# Patient Record
Sex: MALE | Race: BLACK OR AFRICAN AMERICAN | NOT HISPANIC OR LATINO | Employment: OTHER
[De-identification: names, ages, dates, MRNs, and addresses within clinical notes are randomized per-mention and may not be internally consistent; named-entity substitution may affect disease eponyms.]

---

## 2017-07-21 ENCOUNTER — CHARTING TRANS (OUTPATIENT)
Dept: OTHER | Age: 72
End: 2017-07-21

## 2018-01-26 ENCOUNTER — CHARTING TRANS (OUTPATIENT)
Dept: OTHER | Age: 73
End: 2018-01-26

## 2018-11-02 VITALS — HEIGHT: 78 IN | HEART RATE: 64 BPM | SYSTOLIC BLOOD PRESSURE: 116 MMHG | DIASTOLIC BLOOD PRESSURE: 74 MMHG

## 2018-11-03 VITALS — HEART RATE: 76 BPM | SYSTOLIC BLOOD PRESSURE: 130 MMHG | HEIGHT: 78 IN | DIASTOLIC BLOOD PRESSURE: 78 MMHG

## 2019-01-12 RX ORDER — CARVEDILOL 3.12 MG/1
TABLET ORAL
COMMUNITY
Start: 2018-06-13 | End: 2019-06-13

## 2019-04-12 ENCOUNTER — HOSPITAL (OUTPATIENT)
Dept: OTHER | Age: 74
End: 2019-04-12

## 2019-04-12 ENCOUNTER — HOSPITAL (OUTPATIENT)
Dept: OTHER | Age: 74
End: 2019-04-12
Attending: INTERNAL MEDICINE

## 2019-04-12 ENCOUNTER — DIAGNOSTIC TRANS (OUTPATIENT)
Dept: OTHER | Age: 74
End: 2019-04-12

## 2019-04-12 LAB
ALBUMIN SERPL-MCNC: 3.5 GM/DL (ref 3.6–5.1)
ALBUMIN/GLOB SERPL: 0.7 {RATIO} (ref 1–2.4)
ALP SERPL-CCNC: 101 UNIT/L (ref 45–117)
ALT SERPL-CCNC: 17 UNIT/L
ANALYZER ANC (IANC): ABNORMAL
ANION GAP SERPL CALC-SCNC: 16 MMOL/L (ref 10–20)
APPEARANCE UR: ABNORMAL
AST SERPL-CCNC: 42 UNIT/L
BACTERIA #/AREA URNS HPF: ABNORMAL /HPF
BASOPHILS # BLD: 0.1 THOUSAND/MCL (ref 0–0.3)
BASOPHILS NFR BLD: 1 %
BILIRUB SERPL-MCNC: 1.3 MG/DL (ref 0.2–1)
BILIRUB UR QL: NEGATIVE
BUN SERPL-MCNC: 10 MG/DL (ref 6–20)
BUN/CREAT SERPL: 12 (ref 7–25)
CALCIUM SERPL-MCNC: 9.5 MG/DL (ref 8.4–10.2)
CHLORIDE: 101 MMOL/L (ref 98–107)
CO2 SERPL-SCNC: 24 MMOL/L (ref 21–32)
COLOR UR: YELLOW
CREAT SERPL-MCNC: 0.86 MG/DL (ref 0.67–1.17)
DIFFERENTIAL METHOD BLD: ABNORMAL
EOSINOPHIL # BLD: 0.3 THOUSAND/MCL (ref 0.1–0.5)
EOSINOPHIL NFR BLD: 3 %
ERYTHROCYTE [DISTWIDTH] IN BLOOD: 14.5 % (ref 11–15)
GLOBULIN SER-MCNC: 5.1 GM/DL (ref 2–4)
GLUCOSE SERPL-MCNC: 118 MG/DL (ref 65–99)
GLUCOSE UR-MCNC: NEGATIVE MG/DL
HEMATOCRIT: 46.2 % (ref 39–51)
HGB BLD-MCNC: 15 GM/DL (ref 13–17)
HGB UR QL: ABNORMAL
HYALINE CASTS #/AREA URNS LPF: ABNORMAL /LPF (ref 0–5)
IMM GRANULOCYTES # BLD AUTO: 0 THOUSAND/MCL (ref 0–0.2)
IMM GRANULOCYTES NFR BLD: 0 %
INR PPP: 1.1
KETONES UR-MCNC: NEGATIVE MG/DL
LEUKOCYTE ESTERASE UR QL STRIP: ABNORMAL
LIPASE SERPL-CCNC: 182 UNIT/L (ref 73–393)
LYMPHOCYTES # BLD: 2.1 THOUSAND/MCL (ref 1–4)
LYMPHOCYTES NFR BLD: 19 %
MCH RBC QN AUTO: 31.6 PG (ref 26–34)
MCHC RBC AUTO-ENTMCNC: 32.5 GM/DL (ref 32–36.5)
MCV RBC AUTO: 97.5 FL (ref 78–100)
MONOCYTES # BLD: 0.6 THOUSAND/MCL (ref 0.3–0.9)
MONOCYTES NFR BLD: 5 %
NEUTROPHILS # BLD: 7.9 THOUSAND/MCL (ref 1.8–7.7)
NEUTROPHILS NFR BLD: 72 %
NEUTS SEG NFR BLD: ABNORMAL %
NITRITE UR QL: POSITIVE
NRBC (NRBCRE): 0 /100 WBC
NT-PROBNP SERPL-MCNC: 618 PG/ML
PH UR: 6 UNIT (ref 5–7)
PLATELET # BLD: 189 THOUSAND/MCL (ref 140–450)
POTASSIUM SERPL-SCNC: 4.6 MMOL/L (ref 3.4–5.1)
PROT SERPL-MCNC: 8.6 GM/DL (ref 6.4–8.2)
PROT UR QL: NEGATIVE MG/DL
PROTHROMBIN TIME: 11.2 SECONDS (ref 9.7–11.8)
PROTHROMBIN TIME: NORMAL
RBC # BLD: 4.74 MILLION/MCL (ref 4.5–5.9)
RBC #/AREA URNS HPF: ABNORMAL /HPF (ref 0–3)
SODIUM SERPL-SCNC: 136 MMOL/L (ref 135–145)
SP GR UR: <1.005 (ref 1–1.03)
SPECIMEN SOURCE: ABNORMAL
SQUAMOUS #/AREA URNS HPF: ABNORMAL /HPF (ref 0–5)
TROPONIN I SERPL HS-MCNC: 0.16 NG/ML
TROPONIN I SERPL HS-MCNC: 0.18 NG/ML
UROBILINOGEN UR QL: 0.2 MG/DL (ref 0–1)
WBC # BLD: 10.9 THOUSAND/MCL (ref 4.2–11)
WBC #/AREA URNS HPF: ABNORMAL /HPF (ref 0–5)

## 2019-04-13 LAB
ANALYZER ANC (IANC): ABNORMAL
ANION GAP SERPL CALC-SCNC: 14 MMOL/L (ref 10–20)
BASOPHILS # BLD: 0.1 THOUSAND/MCL (ref 0–0.3)
BASOPHILS NFR BLD: 1 %
BUN SERPL-MCNC: 12 MG/DL (ref 6–20)
BUN/CREAT SERPL: 12 (ref 7–25)
CALCIUM SERPL-MCNC: 9.5 MG/DL (ref 8.4–10.2)
CHLORIDE: 104 MMOL/L (ref 98–107)
CO2 SERPL-SCNC: 25 MMOL/L (ref 21–32)
CREAT SERPL-MCNC: 1 MG/DL (ref 0.67–1.17)
DIFFERENTIAL METHOD BLD: ABNORMAL
EOSINOPHIL # BLD: 0.2 THOUSAND/MCL (ref 0.1–0.5)
EOSINOPHIL NFR BLD: 2 %
ERYTHROCYTE [DISTWIDTH] IN BLOOD: 14.4 % (ref 11–15)
GLUCOSE SERPL-MCNC: 107 MG/DL (ref 65–99)
HEMATOCRIT: 42.7 % (ref 39–51)
HGB BLD-MCNC: 13.8 GM/DL (ref 13–17)
IMM GRANULOCYTES # BLD AUTO: 0 THOUSAND/MCL (ref 0–0.2)
IMM GRANULOCYTES NFR BLD: 0 %
LYMPHOCYTES # BLD: 2.1 THOUSAND/MCL (ref 1–4)
LYMPHOCYTES NFR BLD: 21 %
MCH RBC QN AUTO: 31.2 PG (ref 26–34)
MCHC RBC AUTO-ENTMCNC: 32.3 GM/DL (ref 32–36.5)
MCV RBC AUTO: 96.4 FL (ref 78–100)
MONOCYTES # BLD: 0.6 THOUSAND/MCL (ref 0.3–0.9)
MONOCYTES NFR BLD: 6 %
NEUTROPHILS # BLD: 7 THOUSAND/MCL (ref 1.8–7.7)
NEUTROPHILS NFR BLD: 70 %
NEUTS SEG NFR BLD: ABNORMAL %
NRBC (NRBCRE): 0 /100 WBC
PLATELET # BLD: 185 THOUSAND/MCL (ref 140–450)
POTASSIUM SERPL-SCNC: 3.2 MMOL/L (ref 3.4–5.1)
RBC # BLD: 4.43 MILLION/MCL (ref 4.5–5.9)
SODIUM SERPL-SCNC: 140 MMOL/L (ref 135–145)
WBC # BLD: 10 THOUSAND/MCL (ref 4.2–11)

## 2019-04-14 LAB
ANALYZER ANC (IANC): ABNORMAL
APPEARANCE UR: NORMAL
BASOPHILS # BLD: 0.1 THOUSAND/MCL (ref 0–0.3)
BASOPHILS NFR BLD: 1 %
BILIRUB UR QL: NEGATIVE
COLOR UR: YELLOW
CREAT SERPL-MCNC: 0.96 MG/DL (ref 0.67–1.17)
DIFFERENTIAL METHOD BLD: ABNORMAL
EOSINOPHIL # BLD: 0.3 THOUSAND/MCL (ref 0.1–0.5)
EOSINOPHIL NFR BLD: 3 %
ERYTHROCYTE [DISTWIDTH] IN BLOOD: 14.3 % (ref 11–15)
GLUCOSE UR-MCNC: NEGATIVE MG/DL
HEMATOCRIT: 39.5 % (ref 39–51)
HGB BLD-MCNC: 12.9 GM/DL (ref 13–17)
HGB UR QL: NEGATIVE
IMM GRANULOCYTES # BLD AUTO: 0 THOUSAND/MCL (ref 0–0.2)
IMM GRANULOCYTES NFR BLD: 0 %
KETONES UR-MCNC: NEGATIVE MG/DL
LEUKOCYTE ESTERASE UR QL STRIP: NEGATIVE
LYMPHOCYTES # BLD: 1.8 THOUSAND/MCL (ref 1–4)
LYMPHOCYTES NFR BLD: 23 %
MCH RBC QN AUTO: 31.5 PG (ref 26–34)
MCHC RBC AUTO-ENTMCNC: 32.7 GM/DL (ref 32–36.5)
MCV RBC AUTO: 96.6 FL (ref 78–100)
MICROSCOPIC (MT): NORMAL
MONOCYTES # BLD: 0.6 THOUSAND/MCL (ref 0.3–0.9)
MONOCYTES NFR BLD: 8 %
NEUTROPHILS # BLD: 4.9 THOUSAND/MCL (ref 1.8–7.7)
NEUTROPHILS NFR BLD: 65 %
NEUTS SEG NFR BLD: ABNORMAL %
NITRITE UR QL: NEGATIVE
NRBC (NRBCRE): 0 /100 WBC
PH UR: 5.5 UNIT (ref 5–7)
PLATELET # BLD: 181 THOUSAND/MCL (ref 140–450)
POTASSIUM SERPL-SCNC: 3.4 MMOL/L (ref 3.4–5.1)
PROT UR QL: NEGATIVE MG/DL
RBC # BLD: 4.09 MILLION/MCL (ref 4.5–5.9)
SP GR UR: 1.03 (ref 1–1.03)
SPECIMEN SOURCE: NORMAL
UROBILINOGEN UR QL: 0.2 MG/DL (ref 0–1)
WBC # BLD: 7.7 THOUSAND/MCL (ref 4.2–11)

## 2019-04-22 ENCOUNTER — HOSPITAL ENCOUNTER (EMERGENCY)
Facility: HOSPITAL | Age: 74
Discharge: HOME | End: 2019-04-22
Attending: FAMILY MEDICINE | Admitting: EMERGENCY MEDICINE
Payer: MEDICARE

## 2019-04-22 VITALS
RESPIRATION RATE: 18 BRPM | DIASTOLIC BLOOD PRESSURE: 75 MMHG | WEIGHT: 161.69 LBS | OXYGEN SATURATION: 100 % | HEART RATE: 75 BPM | SYSTOLIC BLOOD PRESSURE: 142 MMHG | BODY MASS INDEX: 19.69 KG/M2 | TEMPERATURE: 99 F | HEIGHT: 76 IN

## 2019-04-22 DIAGNOSIS — M79.89 SWELLING OF RIGHT LOWER EXTREMITY: Primary | ICD-10-CM

## 2019-04-22 DIAGNOSIS — M79.89 LEG SWELLING: ICD-10-CM

## 2019-04-22 LAB — D DIMER PPP IA.FEU-MCNC: 14.35 MG/L FEU

## 2019-04-22 PROCEDURE — 99284 EMERGENCY DEPT VISIT MOD MDM: CPT

## 2019-04-22 PROCEDURE — 85379 FIBRIN DEGRADATION QUANT: CPT | Mod: ER

## 2019-04-22 RX ORDER — ASPIRIN 81 MG/1
81 TABLET ORAL DAILY
COMMUNITY

## 2019-04-22 RX ORDER — CARVEDILOL 12.5 MG/1
12.5 TABLET ORAL 2 TIMES DAILY WITH MEALS
COMMUNITY

## 2019-04-22 RX ORDER — NAPROXEN 500 MG/1
500 TABLET ORAL 2 TIMES DAILY WITH MEALS
Qty: 14 TABLET | Refills: 0 | Status: SHIPPED | OUTPATIENT
Start: 2019-04-22 | End: 2019-04-29

## 2019-04-22 NOTE — ED NOTES
Report received from SUSHIL Rod RN with time allowed for and all questions answered; agree with previous RN's assessment and patient care accepted at this time.

## 2019-04-22 NOTE — ED PROVIDER NOTES
Encounter Date: 4/22/2019       History     Chief Complaint   Patient presents with    Leg Swelling     right leg swelling. sent from [facility] for US. +pain to extremety.      This is a 73-year-old Afro-American male presents emergency department with right lower extremity swelling. States that he had come down here from a on a flight from [place] last Friday if that which the of the swelling started.  Denies any pain. Denies any chest pain or shortness of breath.        Review of patient's allergies indicates:  No Known Allergies  Past Medical History:   Diagnosis Date    MI (myocardial infarction)     Pacemaker     Prostate cancer     TIA (transient ischemic attack)      Past Surgical History:   Procedure Laterality Date    CORONARY ARTERY BYPASS GRAFT      INSERTION OF PACEMAKER       History reviewed. No pertinent family history.  Social History     Tobacco Use    Smoking status: Former Smoker    Smokeless tobacco: Never Used   Substance Use Topics    Alcohol use: Not Currently    Drug use: Never     Review of Systems   Constitutional: Negative for chills, fatigue and fever.   HENT: Negative for postnasal drip, rhinorrhea, sneezing, sore throat and trouble swallowing.    Respiratory: Negative for cough, chest tightness and shortness of breath.    Cardiovascular: Negative for chest pain and palpitations.   Gastrointestinal: Negative for abdominal distention, abdominal pain, constipation, diarrhea, nausea and vomiting.   Genitourinary: Negative for dysuria, enuresis, flank pain and hematuria.   Musculoskeletal: Negative for arthralgias, back pain, gait problem and myalgias.        Right leg swelling   Skin: Negative for rash.   Neurological: Negative for dizziness, syncope, weakness and light-headedness.   Hematological: Does not bruise/bleed easily.       Physical Exam     Initial Vitals [04/22/19 1759]   BP Pulse Resp Temp SpO2   (!) 140/88 75 20 98.3 °F (36.8 °C) 98 %      MAP       --         Physical  Exam    Nursing note and vitals reviewed.  Constitutional: He appears well-developed and well-nourished. No distress.   HENT:   Head: Normocephalic.   Right Ear: External ear normal.   Left Ear: External ear normal.   Nose: Nose normal.   Mouth/Throat: Oropharynx is clear and moist.   Eyes: Conjunctivae and EOM are normal. Pupils are equal, round, and reactive to light.   Neck: Normal range of motion. Neck supple.   Cardiovascular: Normal rate, regular rhythm and normal heart sounds.   Pulmonary/Chest: Breath sounds normal. No respiratory distress.   Abdominal: Soft. Bowel sounds are normal. He exhibits no distension. There is no tenderness. There is no rebound and no guarding.   Musculoskeletal: Normal range of motion.   Right lower extremity has +2 pitting edema with erythema.  No warmth, slight positive Homans sign.   Neurological: He is alert and oriented to person, place, and time. He has normal strength. No sensory deficit.   Skin: Skin is warm. Capillary refill takes less than 2 seconds. No rash noted.   Psychiatric: He has a normal mood and affect. His behavior is normal. Thought content normal.         ED Course   Procedures  Labs Reviewed   D DIMER, QUANTITATIVE - Abnormal; Notable for the following components:       Result Value    D-Dimer 14.35 (*)     All other components within normal limits          Imaging Results    None                               Clinical Impression:       ICD-10-CM ICD-9-CM   1. Swelling of right lower extremity M79.89 729.81                 All historical, clinical, radiographic, and laboratory findings were reviewed with the patient/family in detail along with the indications for transfer to [facility].  All remaining questions and concerns were addressed at that time and the patient/family agrees to proceed accordingly.  Similarly all pertinent details of the encounter were discussed with Dr. Easley at [facility] who agrees to accept the patient in transfer based on the needs/patient  preferences outlined above.  Patient will be transferred by [facility] ambulance services secondary to a need for ongoing IV en route.  Flakita Trejo MD  7:01 PM                 Flakita Trejo MD  04/22/19 3419

## 2019-04-23 NOTE — PROVIDER PROGRESS NOTES - EMERGENCY DEPT.
Encounter Date: 4/22/2019    ED Physician Progress Notes             SCRIBE #1 NOTE: I, Aide Victor, am scribing for, and in the presence of, Andrea Vazquez MD. I have scribed the entire note.       History     Chief Complaint   Patient presents with    Leg Swelling     right leg swelling. sent from [facility] for US. +pain to extremety.      Review of patient's allergies indicates:  No Known Allergies      History of Present Illness     HPI    4/22/2019, 8:49 PM  History obtained from the patient and family member      History of Present Illness: Clark Belle is a 73 y.o. male patient with a PMHx of MI, stroke, TIA, prostate cancer, and pacemaker who presents to the Emergency Department for evaluation of RLE swelling which onset gradually 3 days ago. Pt was sent from [facility] by Dr. Trejo for an ultrasound. Family member states pt traveled from [place], [place] to [place] via plane in which the swelling onset. Symptoms are constant and moderate in severity. No mitigating or exacerbating factors reported. Associated sxs include RLE pain. Patient denies any fever, chills, diaphoresis, SOB, cough, CP, leg swelling, palpitations, n/v, extremity weakness/numbness, dizziness, and all other sxs at this time. No prior Tx. No further complaints or concerns at this time.       Arrival mode: [facility]    PCP: Primary Doctor No        Past Medical History:  Past Medical History:   Diagnosis Date    MI (myocardial infarction)     Pacemaker     Prostate cancer     TIA (transient ischemic attack)        Past Surgical History:  Past Surgical History:   Procedure Laterality Date    CORONARY ARTERY BYPASS GRAFT      INSERTION OF PACEMAKER           Family History:  History reviewed. No pertinent family history.    Social History:  Social History     Tobacco Use    Smoking status: Former Smoker    Smokeless tobacco: Never Used   Substance and Sexual Activity    Alcohol use: Not Currently    Drug use: Never     Sexual activity: Not Currently        Review of Systems     Review of Systems   Constitutional: Negative for chills, diaphoresis and fever.   HENT: Negative for rhinorrhea and sore throat.    Respiratory: Negative for cough and shortness of breath.    Cardiovascular: Positive for leg swelling (R). Negative for chest pain and palpitations.   Gastrointestinal: Negative for diarrhea, nausea and vomiting.   Genitourinary: Negative for dysuria, frequency and urgency.   Musculoskeletal: Negative for back pain.        (+) RLE pain   Skin: Negative for rash.   Neurological: Negative for dizziness, weakness and numbness.   All other systems reviewed and are negative.     Physical Exam     Initial Vitals [04/22/19 1759]   BP Pulse Resp Temp SpO2   (!) 140/88 75 20 98.3 °F (36.8 °C) 98 %      MAP       --          Physical Exam    Nursing Notes and Vital Signs Reviewed.  Constitutional: Patient is in no acute distress. Well-developed and well-nourished.  Head: Atraumatic. Normocephalic.  Eyes: PERRL. EOM intact. Conjunctivae are not pale. No scleral icterus.  ENT: Mucous membranes are moist. Oropharynx is clear and symmetric.    Neck: Supple. Full ROM. No lymphadenopathy.  Cardiovascular: Regular rate. Regular rhythm. No murmurs, rubs, or gallops. Distal pulses are 2+ and symmetric.  Pulmonary/Chest: No respiratory distress. Clear to auscultation bilaterally. No wheezing or rales.  Abdominal: Soft and non-distended.  There is no tenderness.  No rebound, guarding, or rigidity. Good bowel sounds.  Musculoskeletal: Moves all extremities. No obvious deformities.   RLE: No evident deformity. Positive for swelling.   Skin: Warm and dry.  Neurological:  Alert, awake, and appropriate.  Normal speech.  No acute focal neurological deficits are appreciated.  Psychiatric: Normal affect. Good eye contact. Appropriate in content.     ED Course   Procedures  ED Vital Signs:  Vitals:    04/22/19 1759 04/22/19 1803 04/22/19 1822 04/22/19 1832  "  BP: (!) 140/88  (!) 160/69 131/70   Pulse: 75 84 84 79   Resp: 20  (!) 22 19   Temp: 98.3 °F (36.8 °C)      TempSrc: Oral      SpO2: 98%  99% 99%   Weight: 73.3 kg (161 lb 11.2 oz)      Height: 6' 4" (1.93 m)       04/22/19 1902 04/22/19 2052 04/22/19 2231   BP: (!) 149/77  (!) 142/75   Pulse: 79 65 75   Resp: 19  18   Temp: 98.2 °F (36.8 °C)  99 °F (37.2 °C)   TempSrc:   Oral   SpO2: 99%  100%   Weight:      Height:          Abnormal Lab Results:  Labs Reviewed   D DIMER, QUANTITATIVE - Abnormal; Notable for the following components:       Result Value    D-Dimer 14.35 (*)     All other components within normal limits        All Lab Results:  Results for orders placed or performed during the hospital encounter of 04/22/19   D dimer, quantitative   Result Value Ref Range    D-Dimer 14.35 (H) <0.50 mg/L FEU       Imaging Results:  Imaging Results          US Lower Extremity Veins Bilateral (Final result)  Result time 04/22/19 21:53:18    Final result by Drew Malagon III, MD (04/22/19 21:53:18)                 Impression:      Negative for bilateral lower extremity DVT.      Electronically signed by: Drew Malagon MD  Date:    04/22/2019  Time:    21:53             Narrative:    EXAMINATION:  US LOWER EXTREMITY VEINS BILATERAL    CLINICAL HISTORY:  Other specified soft tissue disorders    FINDINGS:  Grayscale and color Doppler sonography was formed through the bilateral lower extremities.    The bilateral lower extremity veins are widely patent with normal augmentation, and compressibility and respiratory variability.                                        The Emergency Provider reviewed the vital signs and test results, which are outlined above.     ED Discussion     10:32 PM: Reassessed pt at this time. Discussed with pt all pertinent ED information and results. Discussed pt dx and plan of tx. Gave pt all f/u and return to the ED instructions. All questions and concerns were addressed at this time. Pt " expresses understanding of information and instructions, and is comfortable with plan to discharge. Pt is stable for discharge.    I discussed with patient and/or family/caretaker that evaluation in the ED does not suggest any emergent or life threatening medical conditions requiring immediate intervention beyond what was provided in the ED, and I believe patient is safe for discharge.  Regardless, an unremarkable evaluation in the ED does not preclude the development or presence of a serious of life threatening condition. As such, patient was instructed to return immediately for any worsening or change in current symptoms.    ED Medication(s):  Medications - No data to display    New Prescriptions    NAPROXEN (NAPROSYN) 500 MG TABLET    Take 1 tablet (500 mg total) by mouth 2 (two) times daily with meals. for 7 days       Follow-up Information     Primary Doctor No. Schedule an appointment as soon as possible for a visit in 1 week.                       Medical Decision Making:   Clinical Tests:   Lab Tests: Ordered and Reviewed  Radiological Study: Ordered and Reviewed             Scribe Attestation:   Scribe #1: I performed the above scribed service and the documentation accurately describes the services I performed. I attest to the accuracy of the note.     Attending:   Physician Attestation Statement for Scribe #1: I, Andrea Vazquez MD, personally performed the services described in this documentation, as scribed by Aide Victor, in my presence, and it is both accurate and complete.           Clinical Impression       ICD-10-CM ICD-9-CM   1. Swelling of right lower extremity M79.89 729.81   2. Leg swelling M79.89 729.81       Disposition:   Disposition: Discharged  Condition: Stable

## 2019-04-23 NOTE — ED NOTES
Pt reports after a flight from [place] on Friday he noticed swelling in right leg. Per ems pt has PMH of CVA with right side deficits and slurred speech. Pt is AAOx4 and resting comfortably in bed, and in no apparent distress.

## 2020-01-01 ENCOUNTER — TELEPHONE (OUTPATIENT)
Dept: CARDIOLOGY | Age: 75
End: 2020-01-01

## 2020-01-01 PROCEDURE — 99232 SBSQ HOSP IP/OBS MODERATE 35: CPT | Performed by: INTERNAL MEDICINE

## 2020-01-01 PROCEDURE — 99152 MOD SED SAME PHYS/QHP 5/>YRS: CPT | Performed by: INTERNAL MEDICINE

## 2020-01-01 PROCEDURE — 99221 1ST HOSP IP/OBS SF/LOW 40: CPT | Performed by: INTERNAL MEDICINE

## 2020-01-01 PROCEDURE — 99233 SBSQ HOSP IP/OBS HIGH 50: CPT | Performed by: INTERNAL MEDICINE

## 2020-01-01 PROCEDURE — 99024 POSTOP FOLLOW-UP VISIT: CPT | Performed by: INTERNAL MEDICINE

## 2020-01-01 PROCEDURE — 99232 SBSQ HOSP IP/OBS MODERATE 35: CPT | Performed by: NURSE PRACTITIONER

## 2020-01-01 PROCEDURE — 33263 RMVL & RPLCMT DFB GEN 2 LEAD: CPT | Performed by: INTERNAL MEDICINE

## 2020-10-05 ENCOUNTER — APPOINTMENT (OUTPATIENT)
Dept: GENERAL RADIOLOGY | Facility: HOSPITAL | Age: 75
DRG: 309 | End: 2020-10-05
Attending: EMERGENCY MEDICINE
Payer: MEDICARE

## 2020-10-05 ENCOUNTER — HOSPITAL ENCOUNTER (INPATIENT)
Facility: HOSPITAL | Age: 75
LOS: 2 days | Discharge: HOME HEALTH | DRG: 309 | End: 2020-10-07
Attending: EMERGENCY MEDICINE | Admitting: HOSPITALIST
Payer: MEDICARE

## 2020-10-05 DIAGNOSIS — Z95.1 S/P CABG (CORONARY ARTERY BYPASS GRAFT): ICD-10-CM

## 2020-10-05 DIAGNOSIS — I25.5 ISCHEMIC CARDIOMYOPATHY: ICD-10-CM

## 2020-10-05 DIAGNOSIS — Z45.02 AICD DISCHARGE: Primary | ICD-10-CM

## 2020-10-05 DIAGNOSIS — I47.2 V-TACH (HCC): ICD-10-CM

## 2020-10-05 PROBLEM — I47.20 V-TACH (HCC): Status: ACTIVE | Noted: 2020-10-05

## 2020-10-05 PROBLEM — R00.2 PALPITATIONS: Status: ACTIVE | Noted: 2020-10-05

## 2020-10-05 PROCEDURE — 99222 1ST HOSP IP/OBS MODERATE 55: CPT | Performed by: HOSPITALIST

## 2020-10-05 PROCEDURE — 71045 X-RAY EXAM CHEST 1 VIEW: CPT | Performed by: EMERGENCY MEDICINE

## 2020-10-05 PROCEDURE — 4B02XTZ MEASUREMENT OF CARDIAC DEFIBRILLATOR, EXTERNAL APPROACH: ICD-10-PCS | Performed by: INTERNAL MEDICINE

## 2020-10-05 RX ORDER — ATORVASTATIN CALCIUM 40 MG/1
40 TABLET, FILM COATED ORAL NIGHTLY
Status: ON HOLD | COMMUNITY
End: 2021-08-25

## 2020-10-05 RX ORDER — CARVEDILOL 12.5 MG/1
6.25 TABLET ORAL 2 TIMES DAILY WITH MEALS
Status: ON HOLD | COMMUNITY
End: 2020-10-07

## 2020-10-05 RX ORDER — ATORVASTATIN CALCIUM 10 MG/1
10 TABLET, FILM COATED ORAL NIGHTLY
Status: DISCONTINUED | OUTPATIENT
Start: 2020-10-05 | End: 2020-10-07

## 2020-10-05 RX ORDER — ACETAMINOPHEN 325 MG/1
650 TABLET ORAL EVERY 6 HOURS PRN
Status: DISCONTINUED | OUTPATIENT
Start: 2020-10-05 | End: 2020-10-07

## 2020-10-05 RX ORDER — HEPARIN SODIUM 5000 [USP'U]/ML
5000 INJECTION, SOLUTION INTRAVENOUS; SUBCUTANEOUS EVERY 8 HOURS SCHEDULED
Status: DISCONTINUED | OUTPATIENT
Start: 2020-10-05 | End: 2020-10-07

## 2020-10-05 RX ORDER — ONDANSETRON 2 MG/ML
4 INJECTION INTRAMUSCULAR; INTRAVENOUS EVERY 4 HOURS PRN
Status: DISCONTINUED | OUTPATIENT
Start: 2020-10-05 | End: 2020-10-07

## 2020-10-05 RX ORDER — CARVEDILOL 6.25 MG/1
6.25 TABLET ORAL 2 TIMES DAILY WITH MEALS
Status: DISCONTINUED | OUTPATIENT
Start: 2020-10-06 | End: 2020-10-06

## 2020-10-06 ENCOUNTER — APPOINTMENT (OUTPATIENT)
Dept: CV DIAGNOSTICS | Facility: HOSPITAL | Age: 75
DRG: 309 | End: 2020-10-06
Attending: NURSE PRACTITIONER
Payer: MEDICARE

## 2020-10-06 PROCEDURE — 93306 TTE W/DOPPLER COMPLETE: CPT | Performed by: NURSE PRACTITIONER

## 2020-10-06 PROCEDURE — 99232 SBSQ HOSP IP/OBS MODERATE 35: CPT | Performed by: HOSPITALIST

## 2020-10-06 RX ORDER — ASPIRIN 81 MG/1
81 TABLET ORAL DAILY
Status: DISCONTINUED | OUTPATIENT
Start: 2020-10-06 | End: 2020-10-07

## 2020-10-06 RX ORDER — POTASSIUM CHLORIDE 1.5 G/1.77G
40 POWDER, FOR SOLUTION ORAL EVERY 4 HOURS
Status: COMPLETED | OUTPATIENT
Start: 2020-10-06 | End: 2020-10-06

## 2020-10-06 RX ORDER — CARVEDILOL 12.5 MG/1
12.5 TABLET ORAL 2 TIMES DAILY WITH MEALS
Status: DISCONTINUED | OUTPATIENT
Start: 2020-10-06 | End: 2020-10-07

## 2020-10-06 NOTE — CM/SW NOTE
10/06/20 1500   CM/SW Referral Data   Referral Source Physician   Reason for Referral Discharge planning   Informant Children   Social History   Recreational Drug/Alcohol Use no   Major Changes Last 6 Months no   Domestic/Partner Violence no   Suicidal

## 2020-10-06 NOTE — ED NOTES
Spoke to Santi Pendleton from [place] regarding interrogation. He said patient was in [place] and he got shocked at 28 J did not convert so he received a second shock. Santi Pendleton said new battery in about 6 months to 1 year.  Patient is followed at [facility]. No events s

## 2020-10-06 NOTE — PLAN OF CARE
NURSING ADMISSION NOTE      Patient admitted via CART. Oriented to room. Safety precautions initiated. Bed in low position. Call light in reach. ADMITTED A 75 YRS OLD MALE FROM ER ALERT AND ORIENTED X4 WITH AICD DISCHARGE.  WITH RIGHT SIDED PARALYS

## 2020-10-06 NOTE — ED PROVIDER NOTES
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 Cardiovascular exam: Regular rate and rhythm  Lungs: Clear to auscultation bilaterally. Abdomen: Soft, nondistended, nontender. Extremities: No evidence of deformity. No clubbing or cyanosis. Neuro: No focal deficit is noted.        ED Course established and labs were drawn. Patient continued to be observed here in the emergency department. Patient remained stable throughout the emergency department observation period.     Patient was seen and evaluated in the emergency department by Edilberto Rm

## 2020-10-06 NOTE — CONSULTS
[facility]    Report of Consultation    Елена Aguirre Patient Status:  Emergency    1945 MRN [id number]   Location [address] [facility] Attending Murphy Marshall MD   Hosp Day # 0 PCP Michael Barahona MD     Date of Admission:  8 %.  Temp (24hrs), Av °F (37.2 °C), Min:99 °F (37.2 °C), Max:99 °F (37.2 °C)    Wt Readings from Last 3 Encounters:  10/05/20 : 180 lb (81.6 kg)      Telemetry: a-paced  General: Alert   HEENT: wearing mask  Neck: No JVD  Cardiac: Regular rate and rhyth

## 2020-10-06 NOTE — CM/SW NOTE
MSW acknowledged order for [facility] AT [facility].  [facility] paged [facility]  P:[phone]  X:[phone]. Liaison will meet with the patient to offer services, provide choice, and financial disclosure.     Joseph Jorgenesn LCSW

## 2020-10-06 NOTE — PROGRESS NOTES
[facility] HOSPITAL[facility]  Progress Note     [facility] Patient Status:  Inpatient    1945 MRN [id number]   [facility] 2NE-A Attending Yovani Shannon MD   Hosp Day # 1 PCP Rao Gibbs MD     Chief Complaint: ICD firing    S: Patient ha reviewed in Epic. Medications:   • atorvastatin  10 mg Oral Nightly   • carvedilol  6.25 mg Oral BID with meals   • Heparin Sodium (Porcine)  5,000 Units Subcutaneous Q8H [address]       ASSESSMENT / PLAN:     1. SMVT with ICD firing  1.  Interrogation: SMVT s

## 2020-10-06 NOTE — PLAN OF CARE
Assumed care at 0730 this AM. Pt alert, unable to assess cognition, pt's speech garbled, unable to stated name, , place, situation. Spoke with wife to get consent for requesting records from [place] per Dr. Ana Matos.  Trop this AM 0.726, Dr. Celestine Leahy and car effectiveness of vasoactive medications to optimize hemodynamic stability  - Monitor arterial and/or venous puncture sites for bleeding and/or hematoma  - Assess quality of pulses, skin color and temperature  - Assess for signs of decreased coronary artery

## 2020-10-06 NOTE — H&P
[facility]  History and Physical     Amilcar Champagne Patient Status:  Emergency    1945 MRN [id number]   Location [address] [facility] [address] Attending Gasper Hurtado MD   Hosp Day # 0 PCP No primary care provider on file.      Chief C Anicteric. Neck: No lymphadenopathy. No JVD. No carotid bruits. Respiratory: Clear to auscultation bilaterally. No wheezes. No rhonchi. Cardiovascular: S1, S2. Regular rate and rhythm. No murmurs, rubs or gallops. Equal pulses.    Chest and Back: No tend

## 2020-10-06 NOTE — HOME CARE LIAISON
Received referral from [facility]. Met with patient at the bedside. Patient was unable to appropriatly communicate needs.  Spoke with patients daughter Radha Velez who advised [facility] that this pt is already receiving [facility] services from another [address] [place] [address]

## 2020-10-06 NOTE — PAYOR COMM NOTE
--------------  ADMISSION REVIEW     Payor: [employer] ADV PPO  Subscriber #:  [id number]  Authorization Number: [id number]    Admit date: 10/5/20  Admit time: 2311       Admitting Physician: Lalita Glass MD  Attending Physician:  Annel Gramajo MD  Pr Review of Systems    Positive for stated complaint: aicd fired  Other systems are as noted in HPI. Constitutional and vital signs reviewed. All other systems reviewed and negative except as noted above.     Physical Exam     ED Triage Vitals [10 -----------         ------                     CBC W/ DIFFERENTIAL[[id number]]          Abnormal            Final result                 Please view results for these tests on the individual orders.    [address]   R Prescribed:  Current Discharge Medication List                       Present on Admission           ICD-10-CM Noted POA    Palpitations R00.2 10/5/2020 Unknown                   Signed by Franklin Alan MD on 10/5/2020 10:21 PM            H&P - H&P Note Take by mouth 2 (two) times daily with meals. , Disp: , Rfl:         Review of Systems:   A comprehensive 14 point review of systems was completed. Pertinent positives and negatives noted in the HPI.     Physical Exam:    BP (!) 168/96   Pulse 75   Temp 9 discontinue isolation: yes     Plan of care discussed with patient and ED physician    Michael Amaya MD  10/5/2020                        Electronically signed by Pedro Luis Horne MD on 10/5/2020 10:02 PM         MEDICATIONS ADMINISTERED IN LAST 1 DAY:  aspirin terminate - battery life @ 6 - 12 months     Feels okay now.  In no obvious distress      CXR clear     ECG a-paced with no ischemic change     K+ 3.3   Troponin 0.344     Physical Exam:  Blood pressure 158/81, pulse 71, temperature 99 °F (37.2 °C), tempera regimen     Reviewed with Dr. Diana Corona.      10/6 HOSPITALIST NOTE  Chief Complaint: ICD firing     S: Patient has no complaints.   Limited by dysarthria/exp aphasia     Review of Systems:   10 point ROS completed and was negative, except for pertinent positive a meals   • Heparin Sodium (Porcine)  5,000 Units Subcutaneous [facility]         ASSESSMENT / PLAN:      1. SMVT with ICD firing  1. Interrogation: SMVT s/p two shocks to terminate, battery life @ 6-12months  2. Echo  3. K noted to be low, keep >4  4.  Keep Mg source Oral, resp. rate 20, height 76\", weight 180 lb (81.6 kg), SpO2 98 %.   Temp (24hrs), Av °F (37.2 °C), Min:98.8 °F (37.1 °C), Max:99.2 °F (37.3 °C)     Wt Readings from Last 3 Encounters:  10/05/20 : 180 lb (81.6 kg)       General: Awake, interac

## 2020-10-07 VITALS
RESPIRATION RATE: 18 BRPM | DIASTOLIC BLOOD PRESSURE: 69 MMHG | TEMPERATURE: 98 F | BODY MASS INDEX: 21.92 KG/M2 | HEIGHT: 76 IN | HEART RATE: 71 BPM | OXYGEN SATURATION: 95 % | WEIGHT: 180 LBS | SYSTOLIC BLOOD PRESSURE: 140 MMHG

## 2020-10-07 PROCEDURE — 99239 HOSP IP/OBS DSCHRG MGMT >30: CPT | Performed by: INTERNAL MEDICINE

## 2020-10-07 RX ORDER — CARVEDILOL 12.5 MG/1
12.5 TABLET ORAL 2 TIMES DAILY WITH MEALS
Qty: 60 TABLET | Refills: 3 | Status: ON HOLD | OUTPATIENT
Start: 2020-10-07 | End: 2021-08-25

## 2020-10-07 RX ORDER — ASPIRIN 81 MG/1
81 TABLET ORAL DAILY
Qty: 30 TABLET | Refills: 0 | Status: ON HOLD | COMMUNITY
Start: 2020-10-08 | End: 2021-08-25

## 2020-10-07 NOTE — PLAN OF CARE
A pacing on tele or sinus rhythm  deny pain deny dyspnea  Seen by dr. Mark Rasmussen  when MD  left patient called and has questions re aicd addressed questions  but wants to see cardiologist  Ghanshyam Mccauley  notified will see patient  Patient is right sided par

## 2020-10-07 NOTE — PHYSICAL THERAPY NOTE
PHYSICAL THERAPY QUICK EVALUATION - INPATIENT    Room Number: 6980/3901-R  Evaluation Date: 10/7/2020  Presenting Problem: AICD discharge  Physician Order: PT Eval and Treat     History related to current admission: Pt is 68 yo admitted from home 10/5/20 Bearing Restriction: None                PAIN ASSESSMENT  Rating: Unable to rate  Location: R LE with movement  Management Techniques:  Body mechanics;Repositioning   RANGE OF MOTION AND STRENGTH ASSESSMENT  Upper extremity ROM and strength: see OT Note Completes activity with OT. Refuses chair transfer. Returned to supine with mod assist of 2 with total assist for R extremities. Pt positioned in sitting with HOB elevated to eat breakfast. PCT and RN notified of pt total lift for transfers.  PCT checking i

## 2020-10-07 NOTE — OCCUPATIONAL THERAPY NOTE
OCCUPATIONAL THERAPY QUICK EVALUATION - INPATIENT    Room Number: 8353/8759-O  Evaluation Date: 10/7/2020     Type of Evaluation: Quick Eval  Presenting Problem: AICD discharge    Physician Order: IP Consult to Occupational Therapy  Reason for Therapy:  AD properly    OBJECTIVE  Precautions: Cardiac;Limb alert - right;Bed/chair alarm(expressive aphasia)  Fall Risk: High fall risk    WEIGHT BEARING RESTRICTION  Weight Bearing Restriction: None                PAIN ASSESSMENT  Ratin  Location: denies level while seated EOB. Pt returned to marie-supine position in bed with all needs met, call light within reach, RN aware of session.      Patient End of Session: In bed;Needs met;Call light within reach;RN aware of session/findings;Bracing education prov

## 2020-10-07 NOTE — BH PROGRESS NOTE
Seen by Toño Contreras to discharge home today per cardiology and  Dr. Yair Hood.   I notified patient wife and daughter w/c the daughter requested to feed patient supper and leave hospital by 1800 since she work til' 5 pm   [facility]

## 2020-10-07 NOTE — PROGRESS NOTES
[facility]    Electrophysiology Follow Up Note      Mamie Gowers Patient Status:  Inpatient    1945 MRN [id number]   [facility] 2NE-A Attending Mansoor Swain MD   Hosp Day # 2 PCP Calli Luke MD     Reason for 10/05/20  2117   RBC 4.20   HGB 12.9*   HCT 39.3   MCV 93.6   MCH 30.7   MCHC 32.8   RDW 15.8*   NEPRELIM 5.49   WBC 9.1   .0       Recent Labs   Lab 10/05/20  2117   INR 1.06   PTT 34.9             Data:    TTE: LVEF 25%      Assessment/Plan:  Matteo

## 2020-10-07 NOTE — DISCHARGE SUMMARY
[facility]OSPITALIST  DISCHARGE SUMMARY     Mc Nevarez Patient Status:  Inpatient    1945 MRN [id number]   [facility] 2NE-A Attending No att. providers found   Hosp Day # 2 PCP Wilfrid Faith MD     Date of Admission: 10/5/2020  Date tablet  Refills: 3        CONTINUE taking these medications      Instructions Prescription details   atorvastatin 10 MG Tabs  Commonly known as: LIPITOR      Take 40 mg by mouth daily.    Refills: 0           Where to Get Your Medications      Please pick u

## 2020-10-07 NOTE — PLAN OF CARE
Problem: Impaired Activities of Daily Living  Goal: Achieve highest/safest level of independence in self care  Description: Interventions:  - Assess ability and encourage patient to participate in ADLs to maximize function  - Promote sitting position [facility]

## 2020-10-07 NOTE — PLAN OF CARE
Assumed patient care at [phone]. Vital signs stable. Patient alert with expressive aphasia. Patient denies pain.       Problem: Patient/Family Goals  Goal: Patient/Family Long Term Goal  Description: Patient's Long Term Goal: \"no more shocks\"    Interventi

## 2020-10-07 NOTE — CM/SW NOTE
Nurse spoke with patient's daughter regarding discharge of patient today. She requested patient to leave hospital at 1800. THE [facility] ambulance arranged for transfer as pt has paralysis on right side secondary to previous stroke.   PCS form completed    Driss

## 2020-10-08 NOTE — PLAN OF CARE
Received bedside report on this Pt., at 1542. Pt., awake, has expressive aphasia, does make needs known and answers some questions. Pt., was A-Paced on Tele monitor, sats greater than 92% on RA, Pt., denied any pain or distress. Pt., set up and ate dinner.

## 2020-10-08 NOTE — PAYOR COMM NOTE
--------------  DISCHARGE REVIEW    Payor: [employer] ADV P[employer]  Subscriber #:  [id number]  Authorization Number: [id number]    Admit date: 10/5/20  Admit time:  2311  Discharge Date: 10/7/2020  7:26 PM     Admitting Physician: Paulo Arellano MD  Attending

## 2020-12-21 ENCOUNTER — HOSPITAL ENCOUNTER (INPATIENT)
Facility: HOSPITAL | Age: 75
LOS: 8 days | Discharge: NURSING, REHAB OR LONG-TERM CARE | DRG: 245 | End: 2020-12-29
Attending: EMERGENCY MEDICINE | Admitting: INTERNAL MEDICINE
Payer: MEDICARE

## 2020-12-21 ENCOUNTER — APPOINTMENT (OUTPATIENT)
Dept: GENERAL RADIOLOGY | Facility: HOSPITAL | Age: 75
DRG: 245 | End: 2020-12-21
Attending: EMERGENCY MEDICINE
Payer: MEDICARE

## 2020-12-21 DIAGNOSIS — R77.8 ELEVATED TROPONIN: Primary | ICD-10-CM

## 2020-12-21 DIAGNOSIS — R07.9 CHEST PAIN OF UNCERTAIN ETIOLOGY: ICD-10-CM

## 2020-12-21 PROBLEM — E87.1 HYPONATREMIA: Status: ACTIVE | Noted: 2020-12-21

## 2020-12-21 PROBLEM — R79.89 ELEVATED TROPONIN: Status: ACTIVE | Noted: 2020-12-21

## 2020-12-21 PROBLEM — R73.9 HYPERGLYCEMIA: Status: ACTIVE | Noted: 2020-12-21

## 2020-12-21 LAB
ALBUMIN SERPL-MCNC: 3.6 G/DL (ref 3.4–5)
ALBUMIN/GLOB SERPL: 0.6 {RATIO} (ref 1–2)
ALP LIVER SERPL-CCNC: 101 U/L
ALT SERPL-CCNC: 29 U/L
ANION GAP SERPL CALC-SCNC: 7 MMOL/L (ref 0–18)
APTT PPP: 225.1 SECONDS (ref 25.4–36.1)
APTT PPP: 35.7 SECONDS (ref 25.4–36.1)
AST SERPL-CCNC: 51 U/L (ref 15–37)
ATRIAL RATE: 119 BPM
BASOPHILS # BLD AUTO: 0.07 X10(3) UL (ref 0–0.2)
BASOPHILS NFR BLD AUTO: 0.8 %
BILIRUB SERPL-MCNC: 1 MG/DL (ref 0.1–2)
BUN BLD-MCNC: 15 MG/DL (ref 7–18)
BUN/CREAT SERPL: 15.3 (ref 10–20)
CALCIUM BLD-MCNC: 10 MG/DL (ref 8.5–10.1)
CHLORIDE SERPL-SCNC: 104 MMOL/L (ref 98–112)
CO2 SERPL-SCNC: 24 MMOL/L (ref 21–32)
CREAT BLD-MCNC: 0.98 MG/DL
DEPRECATED RDW RBC AUTO: 54.2 FL (ref 35.1–46.3)
EOSINOPHIL # BLD AUTO: 0.2 X10(3) UL (ref 0–0.7)
EOSINOPHIL NFR BLD AUTO: 2.2 %
ERYTHROCYTE [DISTWIDTH] IN BLOOD BY AUTOMATED COUNT: 15.9 % (ref 11–15)
GLOBULIN PLAS-MCNC: 6 G/DL (ref 2.8–4.4)
GLUCOSE BLD-MCNC: 158 MG/DL (ref 70–99)
HCT VFR BLD AUTO: 43.2 %
HGB BLD-MCNC: 14.2 G/DL
IMM GRANULOCYTES # BLD AUTO: 0.03 X10(3) UL (ref 0–1)
IMM GRANULOCYTES NFR BLD: 0.3 %
LYMPHOCYTES # BLD AUTO: 2.22 X10(3) UL (ref 1–4)
LYMPHOCYTES NFR BLD AUTO: 23.9 %
M PROTEIN MFR SERPL ELPH: 9.6 G/DL (ref 6.4–8.2)
MCH RBC QN AUTO: 30.5 PG (ref 26–34)
MCHC RBC AUTO-ENTMCNC: 32.9 G/DL (ref 31–37)
MCV RBC AUTO: 92.9 FL
MONOCYTES # BLD AUTO: 0.5 X10(3) UL (ref 0.1–1)
MONOCYTES NFR BLD AUTO: 5.4 %
NEUTROPHILS # BLD AUTO: 6.25 X10 (3) UL (ref 1.5–7.7)
NEUTROPHILS # BLD AUTO: 6.25 X10(3) UL (ref 1.5–7.7)
NEUTROPHILS NFR BLD AUTO: 67.4 %
OSMOLALITY SERPL CALC.SUM OF ELEC: 284 MOSM/KG (ref 275–295)
P-R INTERVAL: 128 MS
PLATELET # BLD AUTO: 210 10(3)UL (ref 150–450)
POTASSIUM SERPL-SCNC: 4.4 MMOL/L (ref 3.5–5.1)
Q-T INTERVAL: 350 MS
QRS DURATION: 102 MS
QTC CALCULATION (BEZET): 469 MS
R AXIS: 52 DEGREES
RBC # BLD AUTO: 4.65 X10(6)UL
SARS-COV-2 RNA RESP QL NAA+PROBE: NOT DETECTED
SODIUM SERPL-SCNC: 135 MMOL/L (ref 136–145)
T AXIS: 50 DEGREES
TROPONIN I SERPL-MCNC: 3.62 NG/ML (ref ?–0.04)
TROPONIN I SERPL-MCNC: 3.79 NG/ML (ref ?–0.04)
VENTRICULAR RATE: 108 BPM
WBC # BLD AUTO: 9.3 X10(3) UL (ref 4–11)

## 2020-12-21 PROCEDURE — 99223 1ST HOSP IP/OBS HIGH 75: CPT | Performed by: HOSPITALIST

## 2020-12-21 PROCEDURE — 71045 X-RAY EXAM CHEST 1 VIEW: CPT | Performed by: EMERGENCY MEDICINE

## 2020-12-21 RX ORDER — NITROGLYCERIN 0.4 MG/1
0.4 TABLET SUBLINGUAL EVERY 5 MIN PRN
Status: DISCONTINUED | OUTPATIENT
Start: 2020-12-21 | End: 2020-12-29

## 2020-12-21 RX ORDER — NITROGLYCERIN 0.4 MG/1
0.4 TABLET SUBLINGUAL EVERY 5 MIN PRN
Status: DISCONTINUED | OUTPATIENT
Start: 2020-12-21 | End: 2020-12-21

## 2020-12-21 RX ORDER — ASPIRIN 81 MG/1
81 TABLET ORAL DAILY
Status: DISCONTINUED | OUTPATIENT
Start: 2020-12-21 | End: 2020-12-21

## 2020-12-21 RX ORDER — ATORVASTATIN CALCIUM 40 MG/1
40 TABLET, FILM COATED ORAL DAILY
Status: DISCONTINUED | OUTPATIENT
Start: 2020-12-21 | End: 2020-12-29

## 2020-12-21 RX ORDER — METOCLOPRAMIDE HYDROCHLORIDE 5 MG/ML
5 INJECTION INTRAMUSCULAR; INTRAVENOUS EVERY 8 HOURS PRN
Status: DISCONTINUED | OUTPATIENT
Start: 2020-12-21 | End: 2020-12-29

## 2020-12-21 RX ORDER — ACETAMINOPHEN 325 MG/1
650 TABLET ORAL EVERY 6 HOURS PRN
Status: DISCONTINUED | OUTPATIENT
Start: 2020-12-21 | End: 2020-12-29

## 2020-12-21 RX ORDER — NITROGLYCERIN 20 MG/100ML
INJECTION INTRAVENOUS CONTINUOUS
Status: DISCONTINUED | OUTPATIENT
Start: 2020-12-21 | End: 2020-12-22

## 2020-12-21 RX ORDER — CARVEDILOL 12.5 MG/1
12.5 TABLET ORAL 2 TIMES DAILY WITH MEALS
Status: DISCONTINUED | OUTPATIENT
Start: 2020-12-21 | End: 2020-12-29

## 2020-12-21 RX ORDER — ASPIRIN 325 MG
325 TABLET ORAL DAILY
Status: DISCONTINUED | OUTPATIENT
Start: 2020-12-21 | End: 2020-12-29

## 2020-12-21 RX ORDER — HEPARIN SODIUM AND DEXTROSE 10000; 5 [USP'U]/100ML; G/100ML
INJECTION INTRAVENOUS CONTINUOUS
Status: DISCONTINUED | OUTPATIENT
Start: 2020-12-21 | End: 2020-12-25

## 2020-12-21 RX ORDER — HEPARIN SODIUM 5000 [USP'U]/ML
60 INJECTION INTRAVENOUS; SUBCUTANEOUS ONCE
Status: COMPLETED | OUTPATIENT
Start: 2020-12-21 | End: 2020-12-21

## 2020-12-21 RX ORDER — HEPARIN SODIUM AND DEXTROSE 10000; 5 [USP'U]/100ML; G/100ML
12 INJECTION INTRAVENOUS ONCE
Status: COMPLETED | OUTPATIENT
Start: 2020-12-21 | End: 2020-12-21

## 2020-12-21 RX ORDER — METOPROLOL TARTRATE 5 MG/5ML
5 INJECTION INTRAVENOUS ONCE
Status: COMPLETED | OUTPATIENT
Start: 2020-12-21 | End: 2020-12-21

## 2020-12-21 RX ORDER — ONDANSETRON 2 MG/ML
4 INJECTION INTRAMUSCULAR; INTRAVENOUS EVERY 6 HOURS PRN
Status: DISCONTINUED | OUTPATIENT
Start: 2020-12-21 | End: 2020-12-29

## 2020-12-21 NOTE — ED PROVIDER NOTES
Patient Seen in: [facility] Emergency Department      History   Patient presents with:  Chest Pain Angina    Stated Complaint: Chest pain, abnormal ekg by EMS    HPI    This is a 65-year-old man history of CAD status post CABG, ischemic cardiomyopat Normocephalic and atraumatic. HEENT:  Mucous membranes are moist.   Chest wall no rash no tenderness, AICD present in left upper chest wall  cardiovascular:  Normal rate and regular rhythm.   No Edema  Pulmonary:  Pulmonary effort is normal.  Normal breat new from previous. EKG    Rate, intervals and axes as noted on EKG Report.   Rate: 99  Rhythm: Paced rhythm  Reading: Frequent PVCs, right axi no change from previous s,               ED Course as of Dec 21 1715  ------------------------------------------- started on heparin drip received aspirin on the way in. EKG paced with frequent PVCs.   Patient will be admitted on heparin drip nitro drip to the CTU for further evaluation Case was endorsed to the [facility] Hospitalist.                     Disposition and P

## 2020-12-21 NOTE — H&P
[facility]  History and Physical     Shannon Leonarod Patient Status:  Emergency    1945 MRN [id number]   Location [address] [facility] Attending Tyler Liriano MD   Hosp Day # 0 PCP Roberta Lawrence MD     Chief Complaint: Sachi Kang Take 40 mg by mouth daily. , Disp: , Rfl:         Review of Systems:   A comprehensive 14 point review of systems was completed. Pertinent positives and negatives noted in the HPI.     Physical Exam:    /80   Pulse 97   Temp 98.7 °F (37.1 °C) (Ora of care discussed with ED physician    Rachel Luther MD  12/21/2020

## 2020-12-21 NOTE — CONSULTS
Cardiology Consult Note     PRIMARY CARDIOLOGIST: Edgar Osorio/JEREL      CONSULT FOR: Palpitations elevated troponin, known ischemic cardiomyopathy EF 25% with previous bypass surgery and AICD       HISTORY: 50-year-old gentleman ischemic cardiomyopathy l

## 2020-12-22 ENCOUNTER — APPOINTMENT (OUTPATIENT)
Dept: INTERVENTIONAL RADIOLOGY/VASCULAR | Facility: HOSPITAL | Age: 75
DRG: 245 | End: 2020-12-22
Attending: NURSE PRACTITIONER
Payer: MEDICARE

## 2020-12-22 ENCOUNTER — APPOINTMENT (OUTPATIENT)
Dept: CV DIAGNOSTICS | Facility: HOSPITAL | Age: 75
DRG: 245 | End: 2020-12-22
Attending: INTERNAL MEDICINE
Payer: MEDICARE

## 2020-12-22 LAB
ALBUMIN SERPL-MCNC: 3.4 G/DL (ref 3.4–5)
ALBUMIN/GLOB SERPL: 0.6 {RATIO} (ref 1–2)
ALP LIVER SERPL-CCNC: 93 U/L
ALT SERPL-CCNC: 28 U/L
ANION GAP SERPL CALC-SCNC: 5 MMOL/L (ref 0–18)
APTT PPP: 281.4 SECONDS (ref 25.4–36.1)
APTT PPP: >300 SECONDS (ref 25.4–36.1)
AST SERPL-CCNC: 45 U/L (ref 15–37)
ATRIAL RATE: 87 BPM
BASOPHILS # BLD AUTO: 0.08 X10(3) UL (ref 0–0.2)
BASOPHILS NFR BLD AUTO: 0.9 %
BILIRUB SERPL-MCNC: 0.8 MG/DL (ref 0.1–2)
BUN BLD-MCNC: 17 MG/DL (ref 7–18)
BUN/CREAT SERPL: 20 (ref 10–20)
CALCIUM BLD-MCNC: 10.1 MG/DL (ref 8.5–10.1)
CHLORIDE SERPL-SCNC: 105 MMOL/L (ref 98–112)
CHOLEST SMN-MCNC: 95 MG/DL (ref ?–200)
CO2 SERPL-SCNC: 25 MMOL/L (ref 21–32)
CREAT BLD-MCNC: 0.85 MG/DL
DEPRECATED RDW RBC AUTO: 55.2 FL (ref 35.1–46.3)
EOSINOPHIL # BLD AUTO: 0.33 X10(3) UL (ref 0–0.7)
EOSINOPHIL NFR BLD AUTO: 3.6 %
ERYTHROCYTE [DISTWIDTH] IN BLOOD BY AUTOMATED COUNT: 16.1 % (ref 11–15)
GLOBULIN PLAS-MCNC: 5.4 G/DL (ref 2.8–4.4)
GLUCOSE BLD-MCNC: 98 MG/DL (ref 70–99)
HAV IGM SER QL: 2 MG/DL (ref 1.6–2.6)
HCT VFR BLD AUTO: 40 %
HDLC SERPL-MCNC: 55 MG/DL (ref 40–59)
HGB BLD-MCNC: 12.8 G/DL
IMM GRANULOCYTES # BLD AUTO: 0.03 X10(3) UL (ref 0–1)
IMM GRANULOCYTES NFR BLD: 0.3 %
LDLC SERPL CALC-MCNC: 32 MG/DL (ref ?–100)
LYMPHOCYTES # BLD AUTO: 3.19 X10(3) UL (ref 1–4)
LYMPHOCYTES NFR BLD AUTO: 35.1 %
M PROTEIN MFR SERPL ELPH: 8.8 G/DL (ref 6.4–8.2)
MCH RBC QN AUTO: 30.1 PG (ref 26–34)
MCHC RBC AUTO-ENTMCNC: 32 G/DL (ref 31–37)
MCV RBC AUTO: 94.1 FL
MONOCYTES # BLD AUTO: 0.66 X10(3) UL (ref 0.1–1)
MONOCYTES NFR BLD AUTO: 7.3 %
NEUTROPHILS # BLD AUTO: 4.79 X10 (3) UL (ref 1.5–7.7)
NEUTROPHILS # BLD AUTO: 4.79 X10(3) UL (ref 1.5–7.7)
NEUTROPHILS NFR BLD AUTO: 52.8 %
NONHDLC SERPL-MCNC: 40 MG/DL (ref ?–130)
OSMOLALITY SERPL CALC.SUM OF ELEC: 282 MOSM/KG (ref 275–295)
P AXIS: 61 DEGREES
P-R INTERVAL: 192 MS
PLATELET # BLD AUTO: 182 10(3)UL (ref 150–450)
PLATELET # BLD AUTO: 182 10(3)UL (ref 150–450)
POTASSIUM SERPL-SCNC: 4.1 MMOL/L (ref 3.5–5.1)
Q-T INTERVAL: 364 MS
QRS DURATION: 92 MS
QTC CALCULATION (BEZET): 467 MS
R AXIS: 59 DEGREES
RBC # BLD AUTO: 4.25 X10(6)UL
SODIUM SERPL-SCNC: 135 MMOL/L (ref 136–145)
T AXIS: 102 DEGREES
TRIGL SERPL-MCNC: 39 MG/DL (ref 30–149)
TROPONIN I SERPL-MCNC: 3.18 NG/ML (ref ?–0.04)
VENTRICULAR RATE: 99 BPM
VLDLC SERPL CALC-MCNC: 8 MG/DL (ref 0–30)
WBC # BLD AUTO: 9.1 X10(3) UL (ref 4–11)

## 2020-12-22 PROCEDURE — 99232 SBSQ HOSP IP/OBS MODERATE 35: CPT | Performed by: INTERNAL MEDICINE

## 2020-12-22 PROCEDURE — 4B02XTZ MEASUREMENT OF CARDIAC DEFIBRILLATOR, EXTERNAL APPROACH: ICD-10-PCS | Performed by: INTERNAL MEDICINE

## 2020-12-22 PROCEDURE — 93306 TTE W/DOPPLER COMPLETE: CPT | Performed by: INTERNAL MEDICINE

## 2020-12-22 RX ORDER — CHLORHEXIDINE GLUCONATE 4 G/100ML
30 SOLUTION TOPICAL
Status: COMPLETED | OUTPATIENT
Start: 2020-12-23 | End: 2020-12-22

## 2020-12-22 RX ORDER — LIDOCAINE HYDROCHLORIDE 10 MG/ML
INJECTION, SOLUTION EPIDURAL; INFILTRATION; INTRACAUDAL; PERINEURAL
Status: DISCONTINUED
Start: 2020-12-22 | End: 2020-12-22 | Stop reason: WASHOUT

## 2020-12-22 RX ORDER — SODIUM CHLORIDE 9 MG/ML
INJECTION, SOLUTION INTRAVENOUS
Status: ACTIVE | OUTPATIENT
Start: 2020-12-23 | End: 2020-12-23

## 2020-12-22 RX ORDER — SODIUM CHLORIDE 9 MG/ML
INJECTION, SOLUTION INTRAVENOUS
Status: COMPLETED | OUTPATIENT
Start: 2020-12-23 | End: 2020-12-23

## 2020-12-22 RX ORDER — HEPARIN SODIUM 5000 [USP'U]/ML
INJECTION, SOLUTION INTRAVENOUS; SUBCUTANEOUS
Status: DISCONTINUED
Start: 2020-12-22 | End: 2020-12-22 | Stop reason: WASHOUT

## 2020-12-22 RX ORDER — CEFAZOLIN SODIUM/WATER 2 G/20 ML
2 SYRINGE (ML) INTRAVENOUS
Status: DISPENSED | OUTPATIENT
Start: 2020-12-22 | End: 2020-12-23

## 2020-12-22 NOTE — PAYOR COMM NOTE
Appropriate for inpatient status per guidelines for chest pain - suspicious for ACS.       --------------  ADMISSION REVIEW     Payor: [employer] ADV PPO  Subscriber #:  [id number]  Authorization Number: [id number]       ED Provider Notes        Patient older man sitting in bed no acute distress  Head: Normocephalic and atraumatic. HEENT:  Mucous membranes are moist.   Chest wall no rash no tenderness, AICD present in left upper chest wall  cardiovascular:  Normal rate and regular rhythm.   No Edema  Pul with associated ST depression. These appear to be new from previous. EKG    Rate, intervals and axes as noted on EKG Report.   Rate: 99  Rhythm: Paced rhythm  Reading: Frequent PVCs, right axi no change from previous s,        Xr Chest Ap Portable  (cpt=7 hospitalist.          Disposition and Plan     Clinical Impression:  Elevated troponin  (primary encounter diagnosis)  Chest pain of uncertain etiology    Disposition:  Admit  12/21/2020  4:12 pm               H&P - H&P Note          Chief Complaint: chest Recent Labs   Lab 12/21/20  1520   *   BUN 15   CREATSERUM 0.98   GFRAA 86   GFRNAA 75   CA 10.0   ALB 3.6   *   K 4.4      CO2 24.0   ALKPHO 101   AST 51*   ALT 29   BILT 1.0   TP 9.6*       Estimated Creatinine Clearance: 66.2 mL (LIPITOR) tab 40 mg     Date Action Dose Route User    12/21/2020 2008 Given 40 mg Oral More Tena, RN      Heparin Sodium (Porcine) 5000 UNIT/ML injection 4,900 Units     Date Action Dose Route User    12/21/2020 1632 Given 4,900 Units Intravenous Sc

## 2020-12-22 NOTE — CONSULTS
[facility]    PATIENT'S NAME: Kayden Walsh   ATTENDING PHYSICIAN: Stacy Mcclellan DO   CONSULTING PHYSICIAN: Murphy Carter M.D.    PATIENT ACCOUNT#:   [de-identified]    LOCATION:  [address] [place] [address]  MEDICAL RECORD #:   [id number]       DATE OF BIRTH:  0 b.i.d., Ecotrin 81 mg p.o. daily, atorvastatin 10 mg p.o. daily. ALLERGIES:  No known drug allergies. SOCIAL HISTORY:  No smoking, no alcohol. FAMILY HISTORY:  Significant for CAD.     REVIEW OF SYSTEMS:  What sounds like palpitations coming and go is not having what sounds like angina currently. Thank you very much for allowing me to participate in the care of this patient.      Dictated By Jena Duarte M.D.  d: 12/21/2020 16:12:14  t: 12/21/2020 20:47:32  [facility] [id number]  [facility]/

## 2020-12-22 NOTE — PLAN OF CARE
Received patient, alert, with right sided weakness and expressive aphasia from old stroke. Denied chest pain, denied SOB. On Heparin drip per ACS Protocol and Nitro drip. Discussed POC. Due meds given. NPO P MN.  Safety measures reinforced, call light withi

## 2020-12-22 NOTE — HOME CARE LIAISON
Received referral from [facility]. [facility] is unable to service patient. [facility] liaison re-referred patient. Patient has been accepted by [facility]. CM updated.      [facility]

## 2020-12-22 NOTE — PHYSICAL THERAPY NOTE
PT orders received and chart reviewed. Per RN and NP cardiology requested hold therapy today as pt is on a heparin drip and lithium, with more medical work-up still pending. Will follow and re-attempt as appropriate and able.

## 2020-12-22 NOTE — PLAN OF CARE
Received patient at 0730. Alert and Oriented, expressive aphasia at baseline. Tele Rhythm AV paced/Apaced with frequent PVC. O2 saturation 96% On room air. Breath sounds clear. Bed is locked and in low position. Call light and personal items within reach. cardiac cath    - See additional Care Plan goals for specific interventions  Outcome: Progressing  Goal: Patient/Family Short Term Goal  Description: Patient's Short Term Goal: return to baseline    Interventions:   - - pain control, labs, nitro drip, hepa

## 2020-12-22 NOTE — PROGRESS NOTES
[facility]  Cardiology Progress Note    Daniela Giron Patient Status:  Inpatient    1945 MRN [id number]   [facility] 8NE-A Attending Solo Cyr MD   Hosp Day # 1 PCP Jannet Sanon MD     Subjective:  Patient aphasic.  Yanick fowler ICD shocks and BB was added. ICD inserted at [facility]. Will get interrogation this am.  · CAD, CABG at [facility]  · Hx of CVA, aphasia. hemiparesis    Plan:   1. Await records from [facility] and [place]  2.  Interrogate [employer] IC

## 2020-12-22 NOTE — OCCUPATIONAL THERAPY NOTE
OT orders received and pt chart reviewed. RN and NP cardiology requested hold therapy for today as pt is on heparin drip and lithium, with more medical work-up still pending. OT will continue to follow.

## 2020-12-22 NOTE — PROGRESS NOTES
[facility]  Progress Note     Vianney Benedict Patient Status:  Inpatient    1945 MRN [id number]   [facility] 8NE-A Attending Lina Callahan MD   Hosp Day # 1 PCP Juliano Gagnon MD     Chief Complaint:  CP   S:   Mj Menezes in bed d am   7. Hx of prostate CA     PLAN  Echo, poss LHC  Heparin, NTG gtts  Trop sl lower this am  Bp lower now   ICD interrogation      Quality:  · DVT Prophylaxis: heparin drip  · CODE status: full,   ·  Jaeger: no    Will the patient be referred to [facility] on dis

## 2020-12-22 NOTE — CM/SW NOTE
66 yo admitted with chest pain, elevated troponin. Card w/u in progress. Orders for home health. [facility], they were not able to accept pt so pt was referred and accepted by [facility] home health.      I tried to speak w

## 2020-12-23 ENCOUNTER — APPOINTMENT (OUTPATIENT)
Dept: INTERVENTIONAL RADIOLOGY/VASCULAR | Facility: HOSPITAL | Age: 75
DRG: 245 | End: 2020-12-23
Attending: INTERNAL MEDICINE
Payer: MEDICARE

## 2020-12-23 LAB
APTT PPP: 137 SECONDS (ref 25.4–36.1)
APTT PPP: 55.4 SECONDS (ref 25.4–36.1)
PLATELET # BLD AUTO: 178 10(3)UL (ref 150–450)

## 2020-12-23 PROCEDURE — 0JH608Z INSERTION OF DEFIBRILLATOR GENERATOR INTO CHEST SUBCUTANEOUS TISSUE AND FASCIA, OPEN APPROACH: ICD-10-PCS | Performed by: INTERNAL MEDICINE

## 2020-12-23 PROCEDURE — 0JPT0PZ REMOVAL OF CARDIAC RHYTHM RELATED DEVICE FROM TRUNK SUBCUTANEOUS TISSUE AND FASCIA, OPEN APPROACH: ICD-10-PCS | Performed by: INTERNAL MEDICINE

## 2020-12-23 PROCEDURE — 99232 SBSQ HOSP IP/OBS MODERATE 35: CPT | Performed by: INTERNAL MEDICINE

## 2020-12-23 PROCEDURE — 3E0102A INTRODUCTION OF ANTI-INFECTIVE ENVELOPE INTO SUBCUTANEOUS TISSUE, OPEN APPROACH: ICD-10-PCS | Performed by: INTERNAL MEDICINE

## 2020-12-23 RX ORDER — AMIODARONE HYDROCHLORIDE 200 MG/1
200 TABLET ORAL DAILY
Status: DISCONTINUED | OUTPATIENT
Start: 2020-12-30 | End: 2020-12-24

## 2020-12-23 RX ORDER — BACITRACIN 50000 [USP'U]/1
INJECTION, POWDER, LYOPHILIZED, FOR SOLUTION INTRAMUSCULAR
Status: COMPLETED
Start: 2020-12-23 | End: 2020-12-23

## 2020-12-23 RX ORDER — AMIODARONE HYDROCHLORIDE 200 MG/1
400 TABLET ORAL DAILY
Status: COMPLETED | OUTPATIENT
Start: 2020-12-23 | End: 2020-12-29

## 2020-12-23 RX ORDER — CEFAZOLIN SODIUM/WATER 2 G/20 ML
SYRINGE (ML) INTRAVENOUS
Status: COMPLETED
Start: 2020-12-23 | End: 2020-12-23

## 2020-12-23 RX ORDER — LIDOCAINE HYDROCHLORIDE 10 MG/ML
INJECTION, SOLUTION EPIDURAL; INFILTRATION; INTRACAUDAL; PERINEURAL
Status: COMPLETED
Start: 2020-12-23 | End: 2020-12-23

## 2020-12-23 RX ORDER — MIDAZOLAM HYDROCHLORIDE 1 MG/ML
INJECTION INTRAMUSCULAR; INTRAVENOUS
Status: COMPLETED
Start: 2020-12-23 | End: 2020-12-23

## 2020-12-23 NOTE — CM/SW NOTE
[facility] & RN upon discharge. Updated orders sent to [facility] via [facility]. SW will continue to follow to facilitate discharge home with resumption of [facility] services when cleared by all MD's.

## 2020-12-23 NOTE — PLAN OF CARE
Pt Alert to self and situation. Pt has expressive aphasia due to a previous stroke but follows commands. Pt has paralysis to right side as well. Pt had dual chamber pacemaker replaced today. Site CDI. Pt A paced on tele with frequent pvc's.  Amiodarone star return to baseline    Interventions:   - - pain control, labs, nitro drip, heparin drip, echo, cardiac cath    - See additional Care Plan goals for specific interventions  Outcome: Progressing     Problem: NEUROLOGICAL - ADULT  Goal: Achieves stable or imp

## 2020-12-23 NOTE — PROGRESS NOTES
[facility]  Progress Note     Kritsina Larios Patient Status:  Inpatient    1945 MRN [id number]   [facility] 8NE-A Attending Rafaela Brown MD   Hosp Day # 2 PCP Dillon Zuniga MD     Chief Complaint:  CP   S:    Denies pain  S 6. NPO   7. Coreg   8. Echo EF 15-20%    2. CAD s/p CABG: cont ASA/statin/BB  3. Recent VT with ICD firing: tele. Cont. BB  4. Cerebrovascular disease with residual right hemiparesis  5. HFrEF s/p AICD   Vol compensated    6. HTN  Coreg      7.  Hx of pro

## 2020-12-23 NOTE — PROCEDURES
OPERATION(S) PERFORMED:   1. Dual chamber ICD implant. 2. ICD generator removal.       : Polly Bal MD  INDICATION: Device at RAFAEL. COMPLICATIONS: None     ESTIMATED BLOOD LOSS: Minimal.    SEDATION: IV was maintained by RN.  Patient was assesse

## 2020-12-23 NOTE — PHYSICAL THERAPY NOTE
Per nursing pt is now OK to be seen for PT but will have pacer placement within in an hour . Will f/u tomorrow.

## 2020-12-23 NOTE — OCCUPATIONAL THERAPY NOTE
OCCUPATIONAL THERAPY                     OT order received, chart reviewed. Patient awaiting pacemaker replacement today. Will wait until tomorrow for initiation of OT services.

## 2020-12-24 ENCOUNTER — APPOINTMENT (OUTPATIENT)
Dept: GENERAL RADIOLOGY | Facility: HOSPITAL | Age: 75
DRG: 245 | End: 2020-12-24
Attending: NURSE PRACTITIONER
Payer: MEDICARE

## 2020-12-24 LAB
ANION GAP SERPL CALC-SCNC: 7 MMOL/L (ref 0–18)
BUN BLD-MCNC: 13 MG/DL (ref 7–18)
BUN/CREAT SERPL: 16.5 (ref 10–20)
CALCIUM BLD-MCNC: 9.7 MG/DL (ref 8.5–10.1)
CHLORIDE SERPL-SCNC: 108 MMOL/L (ref 98–112)
CO2 SERPL-SCNC: 20 MMOL/L (ref 21–32)
CREAT BLD-MCNC: 0.79 MG/DL
DEPRECATED RDW RBC AUTO: 54.6 FL (ref 35.1–46.3)
ERYTHROCYTE [DISTWIDTH] IN BLOOD BY AUTOMATED COUNT: 16.2 % (ref 11–15)
GLUCOSE BLD-MCNC: 83 MG/DL (ref 70–99)
HCT VFR BLD AUTO: 37.9 %
HGB BLD-MCNC: 12.3 G/DL
MCH RBC QN AUTO: 30.1 PG (ref 26–34)
MCHC RBC AUTO-ENTMCNC: 32.5 G/DL (ref 31–37)
MCV RBC AUTO: 92.9 FL
OSMOLALITY SERPL CALC.SUM OF ELEC: 279 MOSM/KG (ref 275–295)
PLATELET # BLD AUTO: 160 10(3)UL (ref 150–450)
PLATELET # BLD AUTO: 164 10(3)UL (ref 150–450)
POTASSIUM SERPL-SCNC: 4.3 MMOL/L (ref 3.5–5.1)
RBC # BLD AUTO: 4.08 X10(6)UL
SODIUM SERPL-SCNC: 135 MMOL/L (ref 136–145)
WBC # BLD AUTO: 8.2 X10(3) UL (ref 4–11)

## 2020-12-24 PROCEDURE — 71045 X-RAY EXAM CHEST 1 VIEW: CPT | Performed by: NURSE PRACTITIONER

## 2020-12-24 PROCEDURE — 99232 SBSQ HOSP IP/OBS MODERATE 35: CPT | Performed by: INTERNAL MEDICINE

## 2020-12-24 NOTE — OCCUPATIONAL THERAPY NOTE
OCCUPATIONAL THERAPY EVALUATION - INPATIENT     Room Number: 2522/2906-A  Evaluation Date: 12/24/2020  Type of Evaluation: Initial  Presenting Problem: (AICD discharge s/p replacement)    Physician Order: IP Consult to Occupational Therapy  Reason for Ther reports that she has no help other than CG15 hours/week and has no lift equipment or hospital bed.     SUBJECTIVE   \"That's OK\" when asked if therapist can contact his family to get more information    Patient self-stated goal is not stated    OBJECTIVE Activity Short Form  How much help from another person does the patient currently need…  -   Putting on and taking off regular lower body clothing?: Total  -   Bathing (including washing, rinsing, drying)?: A Lot  -   Toileting, which includes using toilet activities of daily living, rest and sleep, work, leisure and social participation.      The patient is functioning below his previous functional level and would benefit from skilled inpatient OT to address the above deficits, maximizing patient’s ability t with setup and with stand by assist    Functional Transfer Goals  Patient will transfer to bedside commode:  with mod assist    Additional Goals:  Patient will maintain LUE pacemaker precautions during ADLs, ADL mobility without cues.     PPE worn by Villa Rodriguez

## 2020-12-24 NOTE — PROGRESS NOTES
[facility] Progress Note    Subjective: In bed on exam, mild tenderness over PPM pocket site, Mepilex CDI.   Aphasic, but denies SOB, CP, dizziness or palpitations when asked      Objective:   /62 (BP Location: Right arm)   Pulse 86  This study is compared with previous dated 10/6/2020: LVEF is unchanged.  Echo: 12/22/20:      Labs:  Lab Results   Component Value Date    .0 12/24/2020    PTT 55.4 12/23/2020       Lab Results   Component Value Date    INR 1.06 10/05/2020       Me

## 2020-12-24 NOTE — PROGRESS NOTES
[facility]  Progress Note     Kayleen Irving Patient Status:  Inpatient    1945 MRN [id number]   [facility] 8NE-A Attending Ginger Yepez MD   Hosp Day # 3 PCP Devan Richter MD     Chief Complaint:  CP   S:    Da at bedside 12/21/20  2304 12/22/20  0817   TROP 3.790* 3.620* 3.180*        Imaging: Imaging data reviewed in Epic. ASSESSMENT / PLAN:   1. NSTEMI:-  1. Cards following  2. S/p ICD change     3. May  need LHC  At some point   4. Poor po intake   5. Coreg   6.  Echo

## 2020-12-24 NOTE — CM/SW NOTE
Message from OT that rehab team recommendation is that pt will need FLIP as they state he can now not use his LUE after pacemaker and is using lift equipment. Sent multiple referrals to are FLIP facilities via [address].  Pt has afterBOT plan, not sure

## 2020-12-24 NOTE — PHYSICAL THERAPY NOTE
PT orders received and chart reviewed. Attempted to see pt for PT evaluation, however pt adamantly refusing any functional mobility at this time.  Per discussion with the pt and per OT who spoke to the pt's dtr - based on the pt's prior level of function, c

## 2020-12-24 NOTE — PLAN OF CARE
Received patient 1. Alert and follows commands, but with expressive aphasia. A paced per tele monitor, with frequent PVCs. O2 sat adequate on room air. Denies chest pain and sob. LCW pacer site CDI, with mepilex dressing, tender to touch.  Right sided pa heparin drip, echo, cardiac cath    - See additional Care Plan goals for specific interventions  Outcome: Progressing     Problem: NEUROLOGICAL - ADULT  Goal: Achieves stable or improved neurological status  Description: INTERVENTIONS  - Assess for and rep

## 2020-12-24 NOTE — CM/SW NOTE
F/U call to pts daughter Katheryn Mendoza. She is still waiting to speak with MD regarding PPM change yesterday and possible need for cath.      Continues to decline any post acute rehab, except for home health care which has been arranged with [address] [facility]

## 2020-12-25 LAB
ANION GAP SERPL CALC-SCNC: 4 MMOL/L (ref 0–18)
BUN BLD-MCNC: 16 MG/DL (ref 7–18)
BUN/CREAT SERPL: 16.5 (ref 10–20)
CALCIUM BLD-MCNC: 9.5 MG/DL (ref 8.5–10.1)
CHLORIDE SERPL-SCNC: 108 MMOL/L (ref 98–112)
CO2 SERPL-SCNC: 25 MMOL/L (ref 21–32)
CREAT BLD-MCNC: 0.97 MG/DL
DEPRECATED RDW RBC AUTO: 53.9 FL (ref 35.1–46.3)
ERYTHROCYTE [DISTWIDTH] IN BLOOD BY AUTOMATED COUNT: 16.2 % (ref 11–15)
GLUCOSE BLD-MCNC: 111 MG/DL (ref 70–99)
HCT VFR BLD AUTO: 35.7 %
HGB BLD-MCNC: 12 G/DL
MCH RBC QN AUTO: 30.5 PG (ref 26–34)
MCHC RBC AUTO-ENTMCNC: 33.6 G/DL (ref 31–37)
MCV RBC AUTO: 90.6 FL
OSMOLALITY SERPL CALC.SUM OF ELEC: 286 MOSM/KG (ref 275–295)
PLATELET # BLD AUTO: 160 10(3)UL (ref 150–450)
POTASSIUM SERPL-SCNC: 4.2 MMOL/L (ref 3.5–5.1)
RBC # BLD AUTO: 3.94 X10(6)UL
SODIUM SERPL-SCNC: 137 MMOL/L (ref 136–145)
WBC # BLD AUTO: 9.8 X10(3) UL (ref 4–11)

## 2020-12-25 PROCEDURE — 99232 SBSQ HOSP IP/OBS MODERATE 35: CPT | Performed by: HOSPITALIST

## 2020-12-25 NOTE — PLAN OF CARE
Assumed care at 1900, pt resting in bed. A&Ox4. Pt does not speak much d/t expressive aphasia. R sided paralysis noted, however pt can move left side. O2 sat WNL on RA. Denies chest pain, A-paced on tele.  Pacemaker site is painful and tender to touch, givi cath    - See additional Care Plan goals for specific interventions  Outcome: Progressing     Problem: NEUROLOGICAL - ADULT  Goal: Achieves stable or improved neurological status  Description: INTERVENTIONS  - Assess for and report changes in neurological

## 2020-12-25 NOTE — PROGRESS NOTES
[facility]  Progress Note    [facility] Patient Status:  Inpatient    1945 MRN [id number]   [facility] 8NE-A Attending Minoo Crawley MD   Hosp Day # 4 PCP Homero Venegas MD       Assessment and Plan:    1.  CV -patient with 76   CA 10.0 10.1 9.7 9.5   ALB 3.6 3.4  --   --    * 135* 135* 137   K 4.4 4.1 4.3 4.2    105 108 108   CO2 24.0 25.0 20.0* 25.0   ALKPHO 101 93  --   --    AST 51* 45*  --   --    ALT 29 28  --   --    BILT 1.0 0.8  --   --    TP 9.6* 8.8*  -

## 2020-12-25 NOTE — PROGRESS NOTES
[facility]  Progress Note     Mc Nevarez Patient Status:  Inpatient    1945 MRN [id number]   [facility] 8NE-A Attending Conrado Aguirre MD   Hosp Day # 4 PCP Wilfrid Faith MD     Chief Complaint:  CP   S:   Patient denies Epic.  ASSESSMENT / PLAN:   1. NSTEMI:  1. Defer antiplatelet/anticoag to cards, given generator change    2. S/p ICD change     3. May  need LHC  At some point   4. Poor po intake   5. Coreg   6. Echo EF 15-20%    2. CAD s/p CABG: cont ASA/statin/BB  3.  R

## 2020-12-25 NOTE — PLAN OF CARE
Assumed care of pt at 0900  A&Ox3, expressive aphasia and R-sided paralysis d/t history of stroke, no complaints of pain, up with total lift  R/A, A-paced with frequent PVCs, no chest pain, no shortness of breath  Pacemaker site has dressing in place, dres Term Goal: return to baseline    Interventions:   - - pain control, labs, nitro drip, heparin drip, echo, cardiac cath    - See additional Care Plan goals for specific interventions  Outcome: Progressing     Problem: NEUROLOGICAL - ADULT  Goal: Achieves st

## 2020-12-26 LAB
INR BLD: 1.01 (ref 0.89–1.11)
PSA SERPL DL<=0.01 NG/ML-MCNC: 13.6 SECONDS (ref 12.4–14.6)

## 2020-12-26 PROCEDURE — 99232 SBSQ HOSP IP/OBS MODERATE 35: CPT | Performed by: HOSPITALIST

## 2020-12-26 NOTE — PROGRESS NOTES
[facility]  Progress Note     Quique Mckinnon Patient Status:  Inpatient    1945 MRN [id number]   [facility] 8NE-A Attending Erika Salgado MD   Hosp Day # 5 PCP Jason Lux MD     Chief Complaint:  CP   S:   Patient denies 3.620* 3.180*        Imaging: Imaging data reviewed in Epic. ASSESSMENT / PLAN:   1. NSTEMI:  1. Defer antiplatelet/anticoag to cards, given generator change; ASA reordered now  2. S/p ICD change     3. May  need LHC  At some point? 4. Coreg   5.  Echo E

## 2020-12-26 NOTE — PROGRESS NOTES
Charge RN called both [facility] and [facility] for previous medical records. Re: [facility] does not have regular business hours until Monday. Additional fax was sent. Message was left for [facility]. Additional fax sent. Awaiting records.

## 2020-12-26 NOTE — PLAN OF CARE
ALERT AND ORIENTED X4 BUT WITH EXPRESSIVE APHASIA ON TELE MONITOR HR 80'S A PACED. LEFT UPPER CHEST [address] C/D/I. NO BLEEDING AND NO HEMATOMA. RIGHT SIDED PARALYSIS. INCONTINENT OF URINE AND KEPT CLEAN AND DRY AT ALL TIMES. DENIES ANY PAIN.  ALL N - ADULT  Goal: Achieves stable or improved neurological status  Description: INTERVENTIONS  - Assess for and report changes in neurological status  - Initiate measures to prevent increased intracranial pressure  - Maintain blood pressure and fluid volume w

## 2020-12-26 NOTE — PHYSICAL THERAPY NOTE
PHYSICAL THERAPY EVALUATION - INPATIENT     Room Number: 6214/7633-D  Evaluation Date: 12/26/2020  Type of Evaluation: Initial  Physician Order: PT Eval and Treat    Presenting Problem: AICD discharge  Reason for Therapy: Mobility Dysfunction and Dischar has RLE AFO which he dons with assistance, in bed, prior to getting up. SUBJECTIVE  Pt is able to consistently answer yes/no questions appropriately. Patient self-stated goal is unstated.     OBJECTIVE  Precautions: Cardiac;Limb alert - right;Orthotic AM-PAC Score:  Raw Score: 6   Approx Degree of Impairment: 100%   Standardized Score (AM-PAC Scale): 23.55   CMS Modifier (G-Code): CN    FUNCTIONAL ABILITY STATUS  Gait Assessment   Gait Assistance: Not tested                   Skilled Therapy Provi deconditioning. Orders clarified and pt is able to bear weight through LUE for functional transfers.   Will plan to evaluate pt's ability to transfer from bed<>wheelchair as pt is willing to participate, as this will largely influence discharge destina

## 2020-12-26 NOTE — PROGRESS NOTES
[facility]  Cardiology Progress Note    Eddye Edison Patient Status:  Inpatient    1945 MRN [id number]   [facility] 8NE-A Attending Deepak Rossi MD   Hosp Day # 5 PCP Wilfredo Yang MD     Subjective:  Denies chest pain or SOB

## 2020-12-27 LAB — HAV IGM SER QL: 2 MG/DL (ref 1.6–2.6)

## 2020-12-27 PROCEDURE — 99232 SBSQ HOSP IP/OBS MODERATE 35: CPT | Performed by: HOSPITALIST

## 2020-12-27 RX ORDER — DOCUSATE SODIUM 100 MG/1
100 CAPSULE, LIQUID FILLED ORAL 2 TIMES DAILY
Status: DISCONTINUED | OUTPATIENT
Start: 2020-12-27 | End: 2020-12-29

## 2020-12-27 RX ORDER — POLYETHYLENE GLYCOL 3350 17 G/17G
17 POWDER, FOR SOLUTION ORAL DAILY
Status: DISCONTINUED | OUTPATIENT
Start: 2020-12-27 | End: 2020-12-29

## 2020-12-27 NOTE — PLAN OF CARE
Assumed care of patient at 1. Pt is A/Ox4. Expressive aphasia. VSS. A-paced on tele. Room air, maintaining O2 sats. IS at 1000ml. No c/o pain or cardiac symptoms. Incontinent BB. Pacemaker site DONOVAN, painful.    0351: 83 beats of v-tach.  Asymptomatic NEUROLOGICAL - ADULT  Goal: Achieves stable or improved neurological status  Description: INTERVENTIONS  - Assess for and report changes in neurological status  - Initiate measures to prevent increased intracranial pressure  - Maintain blood pressure and f

## 2020-12-27 NOTE — PROGRESS NOTES
[facility]  Progress Note     Becky Gann Patient Status:  Inpatient    1945 MRN [id number]   [facility] 8NE-A Attending Deandre Mccann MD   Hosp Day # 6 PCP Kiya Randhawa MD     Chief Complaint:  CP   S:   Patient denies Recent Labs   Lab 12/21/20  1520 12/21/20  2304 12/22/20  0817   TROP 3.790* 3.620* 3.180*        Imaging: Imaging data reviewed in Epic. ASSESSMENT / PLAN:   1. NSTEMI:  1.  Defer antiplatelet/anticoag to cards, given generator change; ASA reordered n

## 2020-12-27 NOTE — PROGRESS NOTES
[facility]  Cardiology Progress Note    Eddye Edison Patient Status:  Inpatient    1945 MRN [id number]   [facility] 8NE-A Attending Deepak Rossi MD   Hosp Day # 6 PCP Wilfredo Yang MD     Subjective:  Denies chest pain or SOB likely need ischemic evaluation.      FINA Welch  12/27/2020  9:37 AM

## 2020-12-27 NOTE — PLAN OF CARE
AOx4. Slow to respond (baseline expressive aphasia). Right sided hemiparesis (baseline). Calm, cooperative. Max assist. L chest incision site tender to touch. No drainage, no swelling no redness. Tylenol given for pain.  Comfortable at rest.  Atrial paced goals for specific interventions  Outcome: Progressing  Goal: Patient/Family Short Term Goal  Description: Patient's Short Term Goal: return to baseline    Interventions:   - - pain control, labs, nitro drip, heparin drip, echo, cardiac cath    - See addit

## 2020-12-27 NOTE — PLAN OF CARE
AOx4. Slow to respond (baseline expressive aphasia). Right sided hemiparesis (baseline). Calm, cooperative. Max assist. L chest incision site tender to touch. No drainage, no swelling no redness. Declines pain medication at this time.  Comfortable at re heparin drip, echo, cardiac cath    - See additional Care Plan goals for specific interventions  Outcome: Progressing  Goal: Patient/Family Short Term Goal  Description: Patient's Short Term Goal: return to baseline    Interventions:   - - pain control, la

## 2020-12-28 LAB
ANION GAP SERPL CALC-SCNC: 6 MMOL/L (ref 0–18)
ATRIAL RATE: 69 BPM
ATRIAL RATE: 77 BPM
BUN BLD-MCNC: 9 MG/DL (ref 7–18)
BUN/CREAT SERPL: 11.5 (ref 10–20)
CALCIUM BLD-MCNC: 9.9 MG/DL (ref 8.5–10.1)
CHLORIDE SERPL-SCNC: 105 MMOL/L (ref 98–112)
CO2 SERPL-SCNC: 26 MMOL/L (ref 21–32)
CREAT BLD-MCNC: 0.78 MG/DL
DEPRECATED RDW RBC AUTO: 57.8 FL (ref 35.1–46.3)
ERYTHROCYTE [DISTWIDTH] IN BLOOD BY AUTOMATED COUNT: 16.4 % (ref 11–15)
GLUCOSE BLD-MCNC: 101 MG/DL (ref 70–99)
HCT VFR BLD AUTO: 39.5 %
HGB BLD-MCNC: 12.4 G/DL
MCH RBC QN AUTO: 30.1 PG (ref 26–34)
MCHC RBC AUTO-ENTMCNC: 31.4 G/DL (ref 31–37)
MCV RBC AUTO: 95.9 FL
OSMOLALITY SERPL CALC.SUM OF ELEC: 283 MOSM/KG (ref 275–295)
P AXIS: 36 DEGREES
P AXIS: 46 DEGREES
P-R INTERVAL: 222 MS
P-R INTERVAL: 222 MS
PLATELET # BLD AUTO: 156 10(3)UL (ref 150–450)
POTASSIUM SERPL-SCNC: 3.9 MMOL/L (ref 3.5–5.1)
Q-T INTERVAL: 422 MS
Q-T INTERVAL: 434 MS
QRS DURATION: 100 MS
QRS DURATION: 100 MS
QTC CALCULATION (BEZET): 490 MS
QTC CALCULATION (BEZET): 491 MS
R AXIS: 29 DEGREES
R AXIS: 7 DEGREES
RBC # BLD AUTO: 4.12 X10(6)UL
SODIUM SERPL-SCNC: 137 MMOL/L (ref 136–145)
T AXIS: 132 DEGREES
T AXIS: 137 DEGREES
VENTRICULAR RATE: 77 BPM
VENTRICULAR RATE: 81 BPM
WBC # BLD AUTO: 6.8 X10(3) UL (ref 4–11)

## 2020-12-28 PROCEDURE — 99232 SBSQ HOSP IP/OBS MODERATE 35: CPT | Performed by: HOSPITALIST

## 2020-12-28 NOTE — CM/SW NOTE
Call placed to patient's spouse regarding FLIP selection--spoke with spouse denise who deferred decision to daughter anmol. Daughter spoke with nurse practitoner and [facility]   Explained that referral to be sent and explained need to also se

## 2020-12-28 NOTE — DIETARY NOTE
[facility]     Admitting diagnosis:  Elevated troponin [R77.8]  Chest pain of uncertain etiology [F76.0]    Ht: 177.8 cm (5' 10\")  Wt: 64.7 kg (142 lb 10.2 oz).  This is 85% of IBW  Body mass index is 20.47 kg/m²

## 2020-12-28 NOTE — PROGRESS NOTES
Received pt at 1900, pt aaox4, VSS on RA. 1926 Complains of abdominal pain and chest pain around the PM site. Will call the doctor for orders. Otherwise pt is resting, follows commands. Atrial paced on the monitor, SCDs on for VTE prophylaxis.

## 2020-12-28 NOTE — CM/SW NOTE
Received call from sabino with the [place], facility out of network with patient's insurance.   Updated daughter at patient's bedside, interested in [place], especially COVID status, message left for sherman in admissions, await call back    Spoke wi

## 2020-12-28 NOTE — PROGRESS NOTES
[facility]  Cardiology Progress Note    Subjective:  No chest pain or shortness of breath.  Feeling fluttering in his chest.     Objective:  /66 (BP Location: Left arm)   Pulse 79   Temp 98 °F (36.7 °C) (Oral)   Resp 18   Ht 5' 10\" (1.778 m)   W of amiodarone. Monitor tele. Re evaluate tomorrow for d/c to [facility] tomorrow. Case discussed with daughter. Patient seen and examined. Agree with above note and assessment. HEENT- NCAT,   CVS- normal S1, S2  Lungs- clear bilaterally.    · Presently well c

## 2020-12-28 NOTE — PHYSICAL THERAPY NOTE
PHYSICAL THERAPY TREATMENT NOTE - INPATIENT    Room Number: 4596/5197-A     Session: 1   Number of Visits to Meet Established Goals: 3    Presenting Problem: AICD discharge    Problem List   Principal Problem:    AICD discharge  Active Problems:    S/P CA Unable   -   Sitting down on and standing up from a chair with arms (e.g., wheelchair, bedside commode, etc.): Unable   -   Moving from lying on back to sitting on the side of the bed?: Unable   How much help from another person does the patient currently extremities move when receiving PROM as intervention. Pt continues to refuse EOB/OOB activities, even when asked again after performing therapeutic exercises in supine.      DISCHARGE RECOMMENDATIONS  PT Discharge Recommendations: Undetermined(Pt refusing m

## 2020-12-28 NOTE — PROGRESS NOTES
[facility]  Progress Note     Shannon Leonardo Patient Status:  Inpatient    1945 MRN [id number]   [facility] 8NE-A Attending John Valera MD   Hosp Day # 7 PCP Roberta Lawrence MD     Chief Complaint:  CP   S:   Patient denies Labs   Lab 12/26/20  0545   PTP 13.6   INR 1.01     Recent Labs   Lab 12/21/20  1520 12/21/20  2304 12/22/20  0817   TROP 3.790* 3.620* 3.180*        Imaging: Imaging data reviewed in Epic. ASSESSMENT / PLAN:   1. NSTEMI:  1.  Defer antiplatelet/anticoag t

## 2020-12-29 VITALS
TEMPERATURE: 98 F | RESPIRATION RATE: 20 BRPM | HEIGHT: 70 IN | SYSTOLIC BLOOD PRESSURE: 110 MMHG | BODY MASS INDEX: 20.04 KG/M2 | DIASTOLIC BLOOD PRESSURE: 56 MMHG | WEIGHT: 140 LBS | OXYGEN SATURATION: 98 % | HEART RATE: 70 BPM

## 2020-12-29 PROCEDURE — 99238 HOSP IP/OBS DSCHRG MGMT 30/<: CPT | Performed by: HOSPITALIST

## 2020-12-29 RX ORDER — AMIODARONE HYDROCHLORIDE 400 MG/1
400 TABLET ORAL DAILY
Qty: 30 TABLET | Refills: 3 | Status: ON HOLD | OUTPATIENT
Start: 2020-12-29 | End: 2021-01-19

## 2020-12-29 NOTE — CM/SW NOTE
Call received from daughter, ran into some 'issues' that required her presence resulting in a delay to see her father prior to rehab.   [facility] time changed to 1800; message left for faisal pelletier

## 2020-12-29 NOTE — PLAN OF CARE
Pt c/o headache, tx with Tylenol, pt states relief. A/Ox3. Expressive aphasia at baseline. RUE and RLE paralysis from hx CVA. Lung sounds diminished, on room air. IS 1000 achieved, and use encouraged.   A-pacing on telemetry, frequent PVCs, 2 episodes of labs, nitro drip, heparin drip, echo, cardiac cath    - See additional Care Plan goals for specific interventions  Outcome: Progressing  Goal: Patient/Family Short Term Goal  Description: Patient's Short Term Goal: return to baseline    Interventions:   -

## 2020-12-29 NOTE — PROGRESS NOTES
[facility]  Progress Note     Shannon Leonardo Patient Status:  Inpatient    1945 MRN [id number]   [facility] 8NE-A Attending John Valera MD   Hosp Day # 8 PCP Roberta Lawrence MD     Chief Complaint:  CP   S:   Patient  Eating CABG: cont ASA/statin/BB  3. Recent VT with ICD firing: tele. Cont. BB EF lower now; amiodarone per cards  Rates better EP to see   4. 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 HFrEF s/p AICD   Vol compensated  At this time  6.

## 2020-12-29 NOTE — PHYSICAL THERAPY NOTE
PHYSICAL THERAPY TREATMENT NOTE - INPATIENT    Room Number: 8800/0859-F     Session: 2  Number of Visits to Meet Established Goals: 3    Presenting Problem: AICD discharge    Problem List   Principal Problem:    AICD discharge  Active Problems:    S/P CAB lying on back to sitting on the side of the bed?: A Little   How much help from another person does the patient currently need. ..   -   Moving to and from a bed to a chair (including a wheelchair)?: A Lot   -   Need to walk in hospital room?: Total   -   C Discharge Recommendations: Sub-acute rehabilitation     PLAN  PT Treatment Plan: Bed mobility; Family education;Patient education;Transfer training;Balance training;Gait training;Strengthening  Rehab Potential : Poor  Frequency (Obs): 3-5x/week    CURRENT G

## 2020-12-29 NOTE — OCCUPATIONAL THERAPY NOTE
OCCUPATIONAL THERAPY TREATMENT NOTE - INPATIENT     Room Number: 0256/4699-E  Session: 1   Number of Visits to Meet Established Goals: 5    Presenting Problem: (AICD discharge s/p replacement)    History related to current admission:   Admitted 12/21/20 wi person does the patient currently need…  -   Putting on and taking off regular lower body clothing?: Total  -   Bathing (including washing, rinsing, drying)?: A Lot  -   Toileting, which includes using toilet, bedpan or urinal? : A Lot  -   Putting on and increased safety and independence in the current deficit areas of: BADL/IADL dysfunction, decreased endurance, balance, strength, ROM and functional mobility.  These deficit areas contribute to the patient being unable to independently complete the followin

## 2020-12-29 NOTE — CM/SW NOTE
Call received from [facility] in admissions, insurance authorization obtained--to update family

## 2020-12-29 NOTE — PLAN OF CARE
Alert. Oriented. Exp aphasic from hx stroke w/ rt sided hemiparesis. Apaced per tele. Lt upper chest(icd site) tender when touched. Incision c/d/I- steri-strips, approx and bella. Incontinent of urine. External cath applied. Scds in place.  Made comfortable i

## 2020-12-29 NOTE — PLAN OF CARE
NURSING DISCHARGE NOTE    Discharged Rehab facility via Ambulance. Accompanied by Support staff  Belongings Taken by patient/family. Patient denies pain, dizziness, and SOB. Cleared by all consults.  Discharged to [facility] with all belongings, mary jane

## 2020-12-29 NOTE — PROGRESS NOTES
[facility]  Cardiology Progress Note    Subjective:  No chest pain or shortness of breath.     Objective:  /68 (BP Location: Left arm)   Pulse 70   Temp 97.5 °F (36.4 °C) (Oral)   Resp 18   Ht 5' 10\" (1.778 m)   Wt 139 lb 15.9 oz (63.5 kg)   SpO

## 2020-12-29 NOTE — PLAN OF CARE
Received patient at 0730. Alert and orientated x4, right-sided weakness. Tele Rhythm A-paced, with PVCs. O2 saturation 100% on RA. Breath sounds clear. No C/O chest pain, dizziness, or shortness of breath. Pt incontinent, male external catheter in place.  L interventions as ordered  Outcome: Progressing     Problem: Patient/Family Goals  Goal: Patient/Family Long Term Goal  Description: Patient's Long Term Goal: to go home    Interventions:  - pain control, labs, nitro drip, heparin drip, echo, cardiac cath

## 2020-12-29 NOTE — CM/SW NOTE
Updated daughter via phone of acceptance @ [facility] room 115. [facility] arranged for 1500 .   Nurse to call report and send d/c paperwork to [phone] (PCS form completed)

## 2020-12-29 NOTE — CM/SW NOTE
Spoke with OT, requested be seen early by therapies as notes are needed to be evaluated for FLIP stay (pt previously not interested in participating with therapy)

## 2020-12-30 ENCOUNTER — EXTERNAL FACILITY (OUTPATIENT)
Dept: INTERNAL MEDICINE CLINIC | Facility: CLINIC | Age: 75
End: 2020-12-30

## 2020-12-30 DIAGNOSIS — R53.81 PHYSICAL DECONDITIONING: ICD-10-CM

## 2020-12-30 DIAGNOSIS — I21.4 NSTEMI (NON-ST ELEVATED MYOCARDIAL INFARCTION) (HCC): Primary | ICD-10-CM

## 2020-12-30 DIAGNOSIS — I25.5 ISCHEMIC CARDIOMYOPATHY: ICD-10-CM

## 2020-12-30 DIAGNOSIS — I25.10 CAD IN NATIVE ARTERY: ICD-10-CM

## 2020-12-30 DIAGNOSIS — Z45.02 AICD DISCHARGE: ICD-10-CM

## 2020-12-30 DIAGNOSIS — Z85.46 HISTORY OF PROSTATE CANCER: ICD-10-CM

## 2020-12-30 PROCEDURE — 99306 1ST NF CARE HIGH MDM 50: CPT | Performed by: FAMILY MEDICINE

## 2020-12-30 PROCEDURE — 1111F DSCHRG MED/CURRENT MED MERGE: CPT | Performed by: FAMILY MEDICINE

## 2020-12-30 NOTE — DISCHARGE SUMMARY
[facility] HOSPITALIST  DISCHARGE SUMMARY     Amilcar Champagne Patient Status:  Inpatient    1945 MRN [id number]   [facility] 8NE-A Attending No att. providers found   Hosp Day # 8 PCP Darin Wyatt MD     Date of Admission: 2020  Date aware of plans agrees with plans for [facility] labs of been stable transferred there at this evening      Lace+ Score: 77  59-90 High Risk  29-58 Medium Risk  0-28   Low Risk         TCM Follow-Up Recommendation:  LACE > 58:  High Risk of readmission afte Tabs         [place]    Follow-up appointment:   Yeyo Casas MD  [address]  [phone]    Schedule an appointment as soon as possible for a visit      Saloni Berger MD  [address] [facility] [address]  [address]

## 2020-12-30 NOTE — PAYOR COMM NOTE
--------------  DISCHARGE REVIEW    Payor: [employer] MEDICARE ADV PPO  Subscriber #:  [id number]  Authorization Number: [id number]    Admit date: 12/21/20  Admit time:  3159  Discharge Date: 12/29/2020  6:39 PM     Admitting Physician: DO Deb Tay and started on heparin drip    Brief Synopsis:   Patient brought in complaints of chest pain had recent firings from his ICD due to nonsustained VT. Now with NSTEMI. EF 15-20% was seen and followed by cardiology and EP service. Had been on heparin drip. Tabs  Commonly known as: PACERONE      Take 1 tablet (400 mg total) by mouth daily.    Quantity: 30 tablet  Refills: 3        CONTINUE taking these medications      Instructions Prescription details   aspirin 81 MG Tbec      Take 1 tablet (81 mg total) by m INSTRUCTIONS: See electronic chart    Vasile Chavarria NP  Time spent:  > 30 minutes    Chief complaint: nstemi    Subjective:  No acute events overnight. Patient denies nausea,pain,sob,rashes.     ROS:   8 point ROS negative except for that stated in sub

## 2020-12-31 ENCOUNTER — INITIAL APN SNF VISIT (OUTPATIENT)
Dept: INTERNAL MEDICINE CLINIC | Age: 75
End: 2020-12-31

## 2020-12-31 ENCOUNTER — MOBILE ENCOUNTER (OUTPATIENT)
Dept: INTERNAL MEDICINE CLINIC | Facility: CLINIC | Age: 75
End: 2020-12-31

## 2020-12-31 VITALS
SYSTOLIC BLOOD PRESSURE: 136 MMHG | TEMPERATURE: 98 F | RESPIRATION RATE: 18 BRPM | OXYGEN SATURATION: 98 % | DIASTOLIC BLOOD PRESSURE: 81 MMHG | HEART RATE: 70 BPM

## 2020-12-31 DIAGNOSIS — Z45.02 AICD DISCHARGE: ICD-10-CM

## 2020-12-31 DIAGNOSIS — Z95.810 S/P ICD (INTERNAL CARDIAC DEFIBRILLATOR) PROCEDURE: Primary | ICD-10-CM

## 2020-12-31 DIAGNOSIS — R79.89 LOW VITAMIN D LEVEL: ICD-10-CM

## 2020-12-31 DIAGNOSIS — I25.5 ISCHEMIC CARDIOMYOPATHY: ICD-10-CM

## 2020-12-31 DIAGNOSIS — Z79.899 MEDICATION MANAGEMENT: ICD-10-CM

## 2020-12-31 DIAGNOSIS — I21.4 NSTEMI (NON-ST ELEVATED MYOCARDIAL INFARCTION) (HCC): ICD-10-CM

## 2020-12-31 DIAGNOSIS — Z85.46 HISTORY OF PROSTATE CANCER: ICD-10-CM

## 2020-12-31 DIAGNOSIS — R53.81 PHYSICAL DECONDITIONING: ICD-10-CM

## 2020-12-31 PROCEDURE — 99358 PROLONG SERVICE W/O CONTACT: CPT | Performed by: FAMILY MEDICINE

## 2020-12-31 PROCEDURE — 99310 SBSQ NF CARE HIGH MDM 45: CPT | Performed by: NURSE PRACTITIONER

## 2020-12-31 PROCEDURE — 3075F SYST BP GE 130 - 139MM HG: CPT | Performed by: NURSE PRACTITIONER

## 2020-12-31 PROCEDURE — 3079F DIAST BP 80-89 MM HG: CPT | Performed by: NURSE PRACTITIONER

## 2020-12-31 RX ORDER — ACETAMINOPHEN 325 MG/1
650 TABLET ORAL EVERY 6 HOURS PRN
Status: ON HOLD | COMMUNITY
End: 2021-08-25

## 2020-12-31 RX ORDER — ERGOCALCIFEROL 1.25 MG/1
50000 CAPSULE ORAL WEEKLY
Status: ON HOLD | COMMUNITY
End: 2021-08-25

## 2020-12-31 NOTE — PROGRESS NOTES
Mis Benitez  : 1945  Age 67year old  male patient is admitted to Facility: [facility] for [address] [place] [address] date:  20  Discharge date to FLIP:  20  ELOS:  10-14 days  Anticipated discharge date:  21  Advanced Direct[employer] Diagnoses:  1. NSTEMI  2. Nonsustained VT with ICD firing  3. CAD remote CABG  4. CVA history with right-sided residual hemiparesis and aphasia  5. HFrEF status post ICD change  6. Essential hypertension  7. Dyslipidemia  8. History of prostate cancer  9. week.     • amiodarone HCl 400 MG Oral Tab Take 1 tablet (400 mg total) by mouth daily. 30 tablet 3   • carvedilol 12.5 MG Oral Tab Take 1 tablet (12.5 mg total) by mouth 2 (two) times daily with meals.  60 tablet 3   • aspirin 81 MG Oral Tab EC Take 1 tabl nontender, no guarding  :Deferred  MUSCULOSKELETAL: weakness r/t recent hospitalization/diagnoses/sequelae; will undergo therapies to rehab and improve strength, endurance and independence with ADLs  EXTREMITIES/VASCULAR:no cyanosis, clubbing or edema  N 9. 8-12.7 Final  RDW-CV 17 percent 11-15 H Final  Neutrophils 62 percent 37-72 Final  Lymphocytes 26 percent 16-48 Final  Monocytes 7 percent 4-14 Final  Eosinophils 4 percent 0-9 Final  Basophils 1 percent 0-2 Final  Nucleated RBC 0.00 /100 WBC 0.00-0.00 F Cholecalciferol 50,000 units po weekly  Follow-up with PCP in 10 weeks for repeat lab    Follow-ups after FLIP discharge: Follow-Up with PCP within 1-2 weeks following FLIP discharge.      Bijan Nava MD  [address] [facility]   [address] [facility] [address]

## 2020-12-31 NOTE — PROGRESS NOTES
Esdras Kulkarni Author: Caitlin Carter MD     1945 MRN [id number]   [facility]  Admission 20      Last Hospital Discharge 20 PCP Sheldon Preston 15 of Discharge  [facility]        CC --admitted to [facility] from [facility] Cancer Father    • Heart Disorder Mother    • Heart Disorder Sister      Social History    Tobacco Use      Smoking status: Former Smoker      Smokeless tobacco: Never Used    Alcohol use: Never      Frequency: Never    Drug use: Never      ALLERGIES:  No cyanosis, clubbing or edema  NEURO: Alert and awake not in any distress, right-sided weakness cranial nerves are intact,     DIAGNOSTICS REVIEWED AT THIS VISIT:    ASSESSMENT AND PLAN:  Diagnoses and all orders for this visit:    NSTEMI (non-ST elevated my

## 2021-01-01 ENCOUNTER — TELEPHONE (OUTPATIENT)
Dept: CARDIOLOGY | Age: 76
End: 2021-01-01

## 2021-01-01 ENCOUNTER — APPOINTMENT (OUTPATIENT)
Dept: CARDIOLOGY | Age: 76
End: 2021-01-01

## 2021-01-01 ENCOUNTER — ANCILLARY PROCEDURE (OUTPATIENT)
Dept: CARDIOLOGY | Age: 76
End: 2021-01-01
Attending: INTERNAL MEDICINE

## 2021-01-01 ENCOUNTER — OFFICE VISIT (OUTPATIENT)
Dept: CARDIOLOGY | Age: 76
End: 2021-01-01

## 2021-01-01 ENCOUNTER — ANCILLARY ORDERS (OUTPATIENT)
Dept: CARDIOLOGY | Age: 76
End: 2021-01-01

## 2021-01-01 VITALS — DIASTOLIC BLOOD PRESSURE: 70 MMHG | HEART RATE: 72 BPM | SYSTOLIC BLOOD PRESSURE: 118 MMHG

## 2021-01-01 VITALS — DIASTOLIC BLOOD PRESSURE: 68 MMHG | HEART RATE: 68 BPM | SYSTOLIC BLOOD PRESSURE: 124 MMHG

## 2021-01-01 VITALS
DIASTOLIC BLOOD PRESSURE: 52 MMHG | HEART RATE: 69 BPM | WEIGHT: 147.25 LBS | OXYGEN SATURATION: 96 % | SYSTOLIC BLOOD PRESSURE: 95 MMHG | BODY MASS INDEX: 17.02 KG/M2 | TEMPERATURE: 98 F | RESPIRATION RATE: 18 BRPM

## 2021-01-01 DIAGNOSIS — I25.10 CAD IN NATIVE ARTERY: ICD-10-CM

## 2021-01-01 DIAGNOSIS — I25.5 ISCHEMIC CARDIOMYOPATHY: ICD-10-CM

## 2021-01-01 DIAGNOSIS — I21.4 NSTEMI (NON-ST ELEVATED MYOCARDIAL INFARCTION) (CMD): ICD-10-CM

## 2021-01-01 DIAGNOSIS — Z45.02 AICD DISCHARGE: ICD-10-CM

## 2021-01-01 DIAGNOSIS — Z95.1 S/P CABG (CORONARY ARTERY BYPASS GRAFT): ICD-10-CM

## 2021-01-01 DIAGNOSIS — I47.20 V-TACH (CMD): Primary | ICD-10-CM

## 2021-01-01 DIAGNOSIS — I47.20 V-TACH (CMD): ICD-10-CM

## 2021-01-01 DIAGNOSIS — Z09 HOSPITAL DISCHARGE FOLLOW-UP: Primary | ICD-10-CM

## 2021-01-01 DIAGNOSIS — Z95.810 ICD (IMPLANTABLE CARDIOVERTER-DEFIBRILLATOR) IN PLACE: ICD-10-CM

## 2021-01-01 DIAGNOSIS — Z45.018 PACEMAKER REPROGRAMMING/CHECK: ICD-10-CM

## 2021-01-01 PROCEDURE — 99232 SBSQ HOSP IP/OBS MODERATE 35: CPT | Performed by: NURSE PRACTITIONER

## 2021-01-01 PROCEDURE — 99232 SBSQ HOSP IP/OBS MODERATE 35: CPT | Performed by: INTERNAL MEDICINE

## 2021-01-01 PROCEDURE — 99291 CRITICAL CARE FIRST HOUR: CPT | Performed by: INTERNAL MEDICINE

## 2021-01-01 PROCEDURE — 99233 SBSQ HOSP IP/OBS HIGH 50: CPT | Performed by: NURSE PRACTITIONER

## 2021-01-01 PROCEDURE — 99222 1ST HOSP IP/OBS MODERATE 55: CPT | Performed by: INTERNAL MEDICINE

## 2021-01-01 PROCEDURE — 93295 DEV INTERROG REMOTE 1/2/MLT: CPT | Performed by: INTERNAL MEDICINE

## 2021-01-01 PROCEDURE — 99214 OFFICE O/P EST MOD 30 MIN: CPT | Performed by: NURSE PRACTITIONER

## 2021-01-01 PROCEDURE — X1114 CARDIAC DEVICE HOME CHECK - REMOTE UNSCHEDULED: HCPCS | Performed by: INTERNAL MEDICINE

## 2021-01-01 PROCEDURE — 99442 TELEPHONE E&M BY PHYSICIAN EST PT NOT ORIG PREV 7 DAYS 11-20 MIN: CPT | Performed by: NURSE PRACTITIONER

## 2021-01-01 PROCEDURE — 93283 PRGRMG EVAL IMPLANTABLE DFB: CPT | Performed by: INTERNAL MEDICINE

## 2021-01-01 RX ORDER — MEXILETINE HYDROCHLORIDE 150 MG/1
150 CAPSULE ORAL 2 TIMES DAILY
COMMUNITY
Start: 2021-01-01

## 2021-01-01 RX ORDER — ATORVASTATIN CALCIUM 10 MG/1
40 TABLET, FILM COATED ORAL NIGHTLY
COMMUNITY
End: 2021-01-01 | Stop reason: SDUPTHER

## 2021-01-01 RX ORDER — CARVEDILOL 6.25 MG/1
6.25 TABLET ORAL 2 TIMES DAILY WITH MEALS
COMMUNITY
Start: 2021-01-01

## 2021-01-01 RX ORDER — OMEPRAZOLE 20 MG/1
20 CAPSULE, DELAYED RELEASE ORAL 2 TIMES DAILY
COMMUNITY

## 2021-01-01 RX ORDER — SPIRONOLACTONE 25 MG/1
12.5 TABLET ORAL DAILY
COMMUNITY
Start: 2021-01-01 | End: 2021-01-01

## 2021-01-01 RX ORDER — ATORVASTATIN CALCIUM 40 MG/1
40 TABLET, FILM COATED ORAL DAILY
COMMUNITY

## 2021-01-01 RX ORDER — BISACODYL 10 MG
10 SUPPOSITORY, RECTAL RECTAL PRN
COMMUNITY
End: 2021-01-01

## 2021-01-01 RX ORDER — AMIODARONE HYDROCHLORIDE 400 MG/1
400 TABLET ORAL DAILY
COMMUNITY
Start: 2020-01-01 | End: 2021-01-01 | Stop reason: SDUPTHER

## 2021-01-01 RX ORDER — POLYETHYLENE GLYCOL 3350 17 G/17G
17 POWDER, FOR SOLUTION ORAL PRN
COMMUNITY
Start: 2021-01-01 | End: 2021-01-01

## 2021-01-01 RX ORDER — ONDANSETRON 4 MG/1
4 TABLET, FILM COATED ORAL PRN
COMMUNITY
End: 2021-01-01

## 2021-01-01 RX ORDER — AMIODARONE HYDROCHLORIDE 200 MG/1
200 TABLET ORAL DAILY
COMMUNITY
Start: 2021-01-01

## 2021-01-01 RX ORDER — POTASSIUM CHLORIDE 20 MEQ/1
40 TABLET, EXTENDED RELEASE ORAL DAILY
COMMUNITY
Start: 2021-01-01 | End: 2021-01-01

## 2021-01-01 RX ORDER — ACETAMINOPHEN 325 MG/1
650 TABLET ORAL PRN
COMMUNITY

## 2021-01-01 RX ORDER — MIRTAZAPINE 15 MG/1
15 TABLET, FILM COATED ORAL AT BEDTIME
COMMUNITY
Start: 2021-01-01

## 2021-01-01 RX ORDER — CARVEDILOL 12.5 MG/1
12.5 TABLET ORAL 2 TIMES DAILY
COMMUNITY
Start: 2020-01-01 | End: 2021-01-01 | Stop reason: CLARIF

## 2021-01-01 RX ORDER — AMOXICILLIN AND CLAVULANATE POTASSIUM 875; 125 MG/1; MG/1
1 TABLET, FILM COATED ORAL
COMMUNITY
Start: 2021-01-01 | End: 2021-01-01

## 2021-01-01 RX ORDER — ERGOCALCIFEROL 1.25 MG/1
50000 CAPSULE ORAL
COMMUNITY

## 2021-01-01 RX ORDER — ASPIRIN 81 MG/1
TABLET, COATED ORAL DAILY
Refills: 0 | COMMUNITY
Start: 2020-01-01

## 2021-01-01 RX ORDER — DOCUSATE SODIUM 100 MG/1
100 CAPSULE, LIQUID FILLED ORAL PRN
COMMUNITY
Start: 2021-01-01 | End: 2021-01-01

## 2021-01-01 SDOH — HEALTH STABILITY: MENTAL HEALTH: HOW OFTEN DO YOU HAVE A DRINK CONTAINING ALCOHOL?: NEVER

## 2021-01-01 ASSESSMENT — PATIENT HEALTH QUESTIONNAIRE - PHQ9
IS THE PATIENT ABLE TO COGNITIVELY COMPLETE THE PHQ9: NO
SUM OF ALL RESPONSES TO PHQ9 QUESTIONS 1 AND 2: 0
CLINICAL INTERPRETATION OF PHQ9 SCORE: NO FURTHER SCREENING NEEDED
2. FEELING DOWN, DEPRESSED OR HOPELESS: NOT AT ALL
SUM OF ALL RESPONSES TO PHQ9 QUESTIONS 1 AND 2: 0
1. LITTLE INTEREST OR PLEASURE IN DOING THINGS: NOT AT ALL
CLINICAL INTERPRETATION OF PHQ2 SCORE: NO FURTHER SCREENING NEEDED
PATIENT UNABLE TO COMPLETE PHQ: 2

## 2021-01-01 ASSESSMENT — ENCOUNTER SYMPTOMS
SYNCOPE: 0
NEAR-SYNCOPE: 0
COUGH: 0
ABDOMINAL PAIN: 0
ORTHOPNEA: 0
NIGHT SWEATS: 0
FEVER: 0
SHORTNESS OF BREATH: 0
BLOATING: 0

## 2021-01-01 NOTE — PROGRESS NOTES
Mis Benitez Author: Rain Moore MD     1945 MRN [id number]   [facility]  Admission 20      Last Hospital Discharge 20 PCP Tori Snell MD   Hospital of Discharge  [facility]       Patient admitted to [facility] from [place]

## 2021-01-05 ENCOUNTER — SNF VISIT (OUTPATIENT)
Dept: INTERNAL MEDICINE CLINIC | Age: 76
End: 2021-01-05

## 2021-01-05 DIAGNOSIS — Z74.3 REQUIRES CONTINUOUS SUPERVISION FOR ACTIVITIES OF DAILY LIVING (ADL): Primary | ICD-10-CM

## 2021-01-05 DIAGNOSIS — Z95.810 S/P ICD (INTERNAL CARDIAC DEFIBRILLATOR) PROCEDURE: ICD-10-CM

## 2021-01-05 DIAGNOSIS — Z79.899 MEDICATION MANAGEMENT: ICD-10-CM

## 2021-01-05 PROCEDURE — 3078F DIAST BP <80 MM HG: CPT | Performed by: NURSE PRACTITIONER

## 2021-01-05 PROCEDURE — 3074F SYST BP LT 130 MM HG: CPT | Performed by: NURSE PRACTITIONER

## 2021-01-05 PROCEDURE — 99307 SBSQ NF CARE SF MDM 10: CPT | Performed by: NURSE PRACTITIONER

## 2021-01-07 VITALS
DIASTOLIC BLOOD PRESSURE: 71 MMHG | SYSTOLIC BLOOD PRESSURE: 126 MMHG | RESPIRATION RATE: 20 BRPM | HEART RATE: 70 BPM | OXYGEN SATURATION: 99 % | TEMPERATURE: 97 F

## 2021-01-07 NOTE — PROGRESS NOTES
Ebony Padilla, 7/24/1945, 67year old, male    Chief Complaint:  Patient presents with:   Follow - Up  Weakness  [address] date:  12/21/20  Discharge date to [facility]:  12/29/20  ELOS:  10-14 days  Anticipated discharge date:  1/9/21 weakness r/t recent hospitalization/diagnoses/sequelae; will continue therapies to improve strength, endurance and independence with ADLs as much as possible  EXTREMITIES/VASCULAR: no cyanosis, clubbing or edema  NEUROLOGIC: follows commands  PSYCHIATRIC:

## 2021-01-08 ENCOUNTER — SNF DISCHARGE (OUTPATIENT)
Dept: INTERNAL MEDICINE CLINIC | Age: 76
End: 2021-01-08

## 2021-01-08 DIAGNOSIS — R53.81 PHYSICAL DECONDITIONING: ICD-10-CM

## 2021-01-08 DIAGNOSIS — I25.5 ISCHEMIC CARDIOMYOPATHY: ICD-10-CM

## 2021-01-08 DIAGNOSIS — R79.89 LOW VITAMIN D LEVEL: ICD-10-CM

## 2021-01-08 DIAGNOSIS — Z95.810 S/P ICD (INTERNAL CARDIAC DEFIBRILLATOR) PROCEDURE: Primary | ICD-10-CM

## 2021-01-08 DIAGNOSIS — Z45.02 AICD DISCHARGE: ICD-10-CM

## 2021-01-08 DIAGNOSIS — I21.4 NSTEMI (NON-ST ELEVATED MYOCARDIAL INFARCTION) (HCC): ICD-10-CM

## 2021-01-08 DIAGNOSIS — I25.10 CAD IN NATIVE ARTERY: ICD-10-CM

## 2021-01-08 PROBLEM — Z09 HOSPITAL DISCHARGE FOLLOW-UP: Status: ACTIVE | Noted: 2021-01-01

## 2021-01-08 PROBLEM — Z95.1 S/P CABG (CORONARY ARTERY BYPASS GRAFT): Status: ACTIVE | Noted: 2020-01-01

## 2021-01-08 PROBLEM — I47.20 V-TACH (CMD): Status: ACTIVE | Noted: 2020-01-01

## 2021-01-08 PROCEDURE — 3078F DIAST BP <80 MM HG: CPT | Performed by: NURSE PRACTITIONER

## 2021-01-08 PROCEDURE — 99316 NF DSCHRG MGMT 30 MIN+: CPT | Performed by: NURSE PRACTITIONER

## 2021-01-08 PROCEDURE — 3074F SYST BP LT 130 MM HG: CPT | Performed by: NURSE PRACTITIONER

## 2021-01-09 VITALS
SYSTOLIC BLOOD PRESSURE: 95 MMHG | HEART RATE: 69 BPM | TEMPERATURE: 98 F | RESPIRATION RATE: 18 BRPM | DIASTOLIC BLOOD PRESSURE: 52 MMHG | OXYGEN SATURATION: 96 %

## 2021-01-09 NOTE — PROGRESS NOTES
Esdras Kulkarni, 7/24/1945, 67year old, male is being discharged from Facility: [address] [place] [address]    Date of Admission:12/29/20    Date of Anticipated Discharge: 1/9/21                          Admitting Diagnoses:Weakness, infection at in guarding  :Deferred  MUSCULOSKELETAL: weakness r/t recent hospitalization/diagnoses/sequelae; will undergo therapies to rehab and improve strength, endurance and independence with ADLs  EXTREMITIES/VASCULAR:no cyanosis, clubbing or edema  NEUROLOGIC: fol weekly  Follow-up with PCP in 10 weeks for repeat lab     Reflux  Omeprazole 20mg po BID     Follow-ups after FLIP discharge:   Follow-Up with PCP within 1-2 weeks following FLIP discharge.      MD Valentina Lockeo [address]  [address] [place]

## 2021-01-12 ENCOUNTER — SNF VISIT (OUTPATIENT)
Dept: INTERNAL MEDICINE CLINIC | Age: 76
End: 2021-01-12

## 2021-01-12 VITALS
TEMPERATURE: 97 F | SYSTOLIC BLOOD PRESSURE: 116 MMHG | HEART RATE: 70 BPM | OXYGEN SATURATION: 98 % | DIASTOLIC BLOOD PRESSURE: 64 MMHG | RESPIRATION RATE: 18 BRPM

## 2021-01-12 DIAGNOSIS — R10.32 LEFT INGUINAL PAIN: ICD-10-CM

## 2021-01-12 DIAGNOSIS — R10.30 LOWER ABDOMINAL PAIN: Primary | ICD-10-CM

## 2021-01-12 DIAGNOSIS — R11.2 NON-INTRACTABLE VOMITING WITH NAUSEA, UNSPECIFIED VOMITING TYPE: ICD-10-CM

## 2021-01-12 DIAGNOSIS — K56.0 ADYNAMIC ILEUS (HCC): ICD-10-CM

## 2021-01-12 PROCEDURE — 3074F SYST BP LT 130 MM HG: CPT | Performed by: NURSE PRACTITIONER

## 2021-01-12 PROCEDURE — 3078F DIAST BP <80 MM HG: CPT | Performed by: NURSE PRACTITIONER

## 2021-01-12 PROCEDURE — 99309 SBSQ NF CARE MODERATE MDM 30: CPT | Performed by: NURSE PRACTITIONER

## 2021-01-12 RX ORDER — SODIUM CHLORIDE 9 MG/ML
75 INJECTION, SOLUTION INTRAVENOUS CONTINUOUS
Status: ON HOLD | COMMUNITY
Start: 2021-01-12 | End: 2021-08-20

## 2021-01-12 NOTE — PROGRESS NOTES
Kayleen Irving, 7/24/1945, 67year old, male    Chief Complaint:  Patient presents with: Follow - Up: Abdominal pain d/t Adynamic ileus       Subjective:  Patient brought in complaints of chest pain had recent firings from his ICD due to nonsustained VT. him to the ED. Left message via phone with daughter. We will continue monitor closely. Lengthy discussion with daughter early morning regarding discharge planning. Patient supposed to discharge on 1/14/2021.   The daughter will appeal.    Denies insomn epigastric region. Impression:    Mild adynamic ileus with no definite bowel obstruction.     INTERPRETING DOCTOR:   Dr. Chung Yusuf MD  ELECTRONICALLY SIGNED: 01/12/2021 12:53:53 PM    [facility] [address] [facility] [place] [facility]:  Abdomina

## 2021-01-13 ENCOUNTER — APPOINTMENT (OUTPATIENT)
Dept: GENERAL RADIOLOGY | Facility: HOSPITAL | Age: 76
DRG: 871 | End: 2021-01-13
Attending: HOSPITALIST
Payer: MEDICARE

## 2021-01-13 ENCOUNTER — APPOINTMENT (OUTPATIENT)
Dept: GENERAL RADIOLOGY | Facility: HOSPITAL | Age: 76
DRG: 871 | End: 2021-01-13
Attending: EMERGENCY MEDICINE
Payer: MEDICARE

## 2021-01-13 ENCOUNTER — HOSPITAL ENCOUNTER (INPATIENT)
Facility: HOSPITAL | Age: 76
LOS: 6 days | Discharge: NURSING, REHAB OR LONG-TERM CARE | DRG: 871 | End: 2021-01-19
Attending: EMERGENCY MEDICINE | Admitting: INTERNAL MEDICINE
Payer: MEDICARE

## 2021-01-13 ENCOUNTER — TELEPHONE (OUTPATIENT)
Dept: INTERNAL MEDICINE CLINIC | Age: 76
End: 2021-01-13

## 2021-01-13 ENCOUNTER — HOSPITAL ENCOUNTER (OUTPATIENT)
Dept: CT IMAGING | Facility: HOSPITAL | Age: 76
Discharge: HOME | End: 2021-01-13
Attending: EMERGENCY MEDICINE
Payer: MEDICARE

## 2021-01-13 DIAGNOSIS — R11.2 INTRACTABLE VOMITING WITH NAUSEA, UNSPECIFIED VOMITING TYPE: ICD-10-CM

## 2021-01-13 DIAGNOSIS — J69.0 ASPIRATION PNEUMONITIS (HCC): ICD-10-CM

## 2021-01-13 DIAGNOSIS — K56.7 ILEUS OF UNSPECIFIED TYPE (HCC): Primary | ICD-10-CM

## 2021-01-13 DIAGNOSIS — R14.0 ABDOMINAL DISTENTION: ICD-10-CM

## 2021-01-13 DIAGNOSIS — Z45.02 AICD DISCHARGE: ICD-10-CM

## 2021-01-13 DIAGNOSIS — R77.8 TROPONIN LEVEL ELEVATED: ICD-10-CM

## 2021-01-13 DIAGNOSIS — E87.6 HYPOKALEMIA: ICD-10-CM

## 2021-01-13 PROBLEM — R79.89 TROPONIN LEVEL ELEVATED: Status: ACTIVE | Noted: 2021-01-13

## 2021-01-13 PROBLEM — R11.10 INTRACTABLE VOMITING: Status: ACTIVE | Noted: 2021-01-13

## 2021-01-13 LAB
ALBUMIN SERPL-MCNC: 3.4 G/DL (ref 3.4–5)
ALBUMIN/GLOB SERPL: 0.6 {RATIO} (ref 1–2)
ALP LIVER SERPL-CCNC: 91 U/L
ALT SERPL-CCNC: 69 U/L
ANION GAP SERPL CALC-SCNC: 10 MMOL/L (ref 0–18)
ANION GAP SERPL CALC-SCNC: 11 MMOL/L (ref 0–18)
AST SERPL-CCNC: 61 U/L (ref 15–37)
ATRIAL RATE: 70 BPM
BASOPHILS # BLD AUTO: 0.03 X10(3) UL (ref 0–0.2)
BASOPHILS # BLD: 0 X10(3) UL (ref 0–0.2)
BASOPHILS NFR BLD AUTO: 0.3 %
BASOPHILS NFR BLD: 0 %
BILIRUB SERPL-MCNC: 1.1 MG/DL (ref 0.1–2)
BILIRUB UR QL STRIP.AUTO: NEGATIVE
BUN BLD-MCNC: 23 MG/DL (ref 7–18)
BUN BLD-MCNC: 26 MG/DL (ref 7–18)
BUN/CREAT SERPL: 17.2 (ref 10–20)
BUN/CREAT SERPL: 21 (ref 10–20)
CALCIUM BLD-MCNC: 8.8 MG/DL (ref 8.5–10.1)
CALCIUM BLD-MCNC: 9.3 MG/DL (ref 8.5–10.1)
CHLORIDE SERPL-SCNC: 102 MMOL/L (ref 98–112)
CHLORIDE SERPL-SCNC: 107 MMOL/L (ref 98–112)
CO2 SERPL-SCNC: 17 MMOL/L (ref 21–32)
CO2 SERPL-SCNC: 23 MMOL/L (ref 21–32)
CREAT BLD-MCNC: 1.24 MG/DL
CREAT BLD-MCNC: 1.34 MG/DL
DEPRECATED RDW RBC AUTO: 59.6 FL (ref 35.1–46.3)
DEPRECATED RDW RBC AUTO: 63.7 FL (ref 35.1–46.3)
EOSINOPHIL # BLD AUTO: 0 X10(3) UL (ref 0–0.7)
EOSINOPHIL # BLD: 0.1 X10(3) UL (ref 0–0.7)
EOSINOPHIL NFR BLD AUTO: 0 %
EOSINOPHIL NFR BLD: 1 %
ERYTHROCYTE [DISTWIDTH] IN BLOOD BY AUTOMATED COUNT: 17 % (ref 11–15)
ERYTHROCYTE [DISTWIDTH] IN BLOOD BY AUTOMATED COUNT: 17.1 % (ref 11–15)
GLOBULIN PLAS-MCNC: 5.4 G/DL (ref 2.8–4.4)
GLUCOSE BLD-MCNC: 126 MG/DL (ref 70–99)
GLUCOSE BLD-MCNC: 134 MG/DL (ref 70–99)
GLUCOSE BLD-MCNC: 144 MG/DL (ref 70–99)
GLUCOSE BLD-MCNC: 146 MG/DL (ref 70–99)
GLUCOSE BLD-MCNC: 173 MG/DL (ref 70–99)
GLUCOSE UR STRIP.AUTO-MCNC: NEGATIVE MG/DL
HCT VFR BLD AUTO: 42.7 %
HCT VFR BLD AUTO: 43.9 %
HGB BLD-MCNC: 13.4 G/DL
HGB BLD-MCNC: 13.6 G/DL
IMM GRANULOCYTES # BLD AUTO: 0.01 X10(3) UL (ref 0–1)
IMM GRANULOCYTES NFR BLD: 0.1 %
KETONES UR STRIP.AUTO-MCNC: NEGATIVE MG/DL
LACTATE SERPL-SCNC: 3.3 MMOL/L (ref 0.4–2)
LACTATE SERPL-SCNC: 3.6 MMOL/L (ref 0.4–2)
LACTATE SERPL-SCNC: 4.9 MMOL/L (ref 0.4–2)
LEUKOCYTE ESTERASE UR QL STRIP.AUTO: NEGATIVE
LIPASE SERPL-CCNC: 81 U/L (ref 73–393)
LYMPHOCYTES # BLD AUTO: 1.27 X10(3) UL (ref 1–4)
LYMPHOCYTES NFR BLD AUTO: 14.4 %
LYMPHOCYTES NFR BLD: 1.49 X10(3) UL (ref 1–4)
LYMPHOCYTES NFR BLD: 15 %
M PROTEIN MFR SERPL ELPH: 8.8 G/DL (ref 6.4–8.2)
MCH RBC QN AUTO: 30.6 PG (ref 26–34)
MCH RBC QN AUTO: 30.7 PG (ref 26–34)
MCHC RBC AUTO-ENTMCNC: 30.5 G/DL (ref 31–37)
MCHC RBC AUTO-ENTMCNC: 31.9 G/DL (ref 31–37)
MCV RBC AUTO: 100.5 FL
MCV RBC AUTO: 96.2 FL
METAMYELOCYTES # BLD: 0.2 X10(3) UL
METAMYELOCYTES NFR BLD: 2 %
MONOCYTES # BLD AUTO: 0.29 X10(3) UL (ref 0.1–1)
MONOCYTES # BLD: 0.2 X10(3) UL (ref 0.1–1)
MONOCYTES NFR BLD AUTO: 3.3 %
MONOCYTES NFR BLD: 2 %
MORPHOLOGY: NORMAL
NEUTROPHILS # BLD AUTO: 7.24 X10 (3) UL (ref 1.5–7.7)
NEUTROPHILS # BLD AUTO: 7.24 X10(3) UL (ref 1.5–7.7)
NEUTROPHILS # BLD AUTO: 7.57 X10 (3) UL (ref 1.5–7.7)
NEUTROPHILS NFR BLD AUTO: 81.9 %
NEUTROPHILS NFR BLD: 26 %
NEUTS BAND NFR BLD: 54 %
NEUTS HYPERSEG # BLD: 7.92 X10(3) UL (ref 1.5–7.7)
NITRITE UR QL STRIP.AUTO: NEGATIVE
OSMOLALITY SERPL CALC.SUM OF ELEC: 284 MOSM/KG (ref 275–295)
OSMOLALITY SERPL CALC.SUM OF ELEC: 290 MOSM/KG (ref 275–295)
P AXIS: 51 DEGREES
P-R INTERVAL: 218 MS
PH UR STRIP.AUTO: 5 [PH] (ref 4.5–8)
PLATELET # BLD AUTO: 194 10(3)UL (ref 150–450)
PLATELET # BLD AUTO: 255 10(3)UL (ref 150–450)
PLATELET MORPHOLOGY: NORMAL
POTASSIUM SERPL-SCNC: 5 MMOL/L (ref 3.5–5.1)
POTASSIUM SERPL-SCNC: 5.1 MMOL/L (ref 3.5–5.1)
PROCALCITONIN SERPL-MCNC: 0.42 NG/ML (ref ?–0.16)
PROT UR STRIP.AUTO-MCNC: NEGATIVE MG/DL
Q-T INTERVAL: 454 MS
QRS DURATION: 102 MS
QTC CALCULATION (BEZET): 490 MS
R AXIS: 22 DEGREES
RBC # BLD AUTO: 4.37 X10(6)UL
RBC # BLD AUTO: 4.44 X10(6)UL
RBC UR QL AUTO: NEGATIVE
SARS-COV-2 RNA RESP QL NAA+PROBE: NOT DETECTED
SODIUM SERPL-SCNC: 134 MMOL/L (ref 136–145)
SODIUM SERPL-SCNC: 136 MMOL/L (ref 136–145)
SP GR UR STRIP.AUTO: 1.02 (ref 1–1.03)
T AXIS: 214 DEGREES
TOTAL CELLS COUNTED: 100
TROPONIN I SERPL-MCNC: 2.15 NG/ML (ref ?–0.04)
TROPONIN I SERPL-MCNC: 2.2 NG/ML (ref ?–0.04)
TROPONIN I SERPL-MCNC: 3 NG/ML (ref ?–0.04)
UROBILINOGEN UR STRIP.AUTO-MCNC: 4 MG/DL
VENTRICULAR RATE: 70 BPM
WBC # BLD AUTO: 8.8 X10(3) UL (ref 4–11)
WBC # BLD AUTO: 9.9 X10(3) UL (ref 4–11)

## 2021-01-13 PROCEDURE — 99223 1ST HOSP IP/OBS HIGH 75: CPT | Performed by: INTERNAL MEDICINE

## 2021-01-13 PROCEDURE — 71045 X-RAY EXAM CHEST 1 VIEW: CPT | Performed by: EMERGENCY MEDICINE

## 2021-01-13 PROCEDURE — 71045 X-RAY EXAM CHEST 1 VIEW: CPT | Performed by: HOSPITALIST

## 2021-01-13 PROCEDURE — 74176 CT ABD & PELVIS W/O CONTRAST: CPT | Performed by: EMERGENCY MEDICINE

## 2021-01-13 PROCEDURE — 74019 RADEX ABDOMEN 2 VIEWS: CPT | Performed by: EMERGENCY MEDICINE

## 2021-01-13 RX ORDER — MELATONIN
3 NIGHTLY PRN
Status: DISCONTINUED | OUTPATIENT
Start: 2021-01-13 | End: 2021-01-19

## 2021-01-13 RX ORDER — ASPIRIN 81 MG/1
81 TABLET ORAL DAILY
Status: DISCONTINUED | OUTPATIENT
Start: 2021-01-13 | End: 2021-01-19

## 2021-01-13 RX ORDER — BISACODYL 10 MG
30 SUPPOSITORY, RECTAL RECTAL ONCE
Status: COMPLETED | OUTPATIENT
Start: 2021-01-13 | End: 2021-01-13

## 2021-01-13 RX ORDER — ONDANSETRON 2 MG/ML
4 INJECTION INTRAMUSCULAR; INTRAVENOUS ONCE
Status: COMPLETED | OUTPATIENT
Start: 2021-01-13 | End: 2021-01-13

## 2021-01-13 RX ORDER — CARVEDILOL 12.5 MG/1
12.5 TABLET ORAL 2 TIMES DAILY WITH MEALS
Status: DISCONTINUED | OUTPATIENT
Start: 2021-01-13 | End: 2021-01-15

## 2021-01-13 RX ORDER — DEXTROSE AND SODIUM CHLORIDE 5; .45 G/100ML; G/100ML
INJECTION, SOLUTION INTRAVENOUS CONTINUOUS
Status: ACTIVE | OUTPATIENT
Start: 2021-01-13 | End: 2021-01-13

## 2021-01-13 RX ORDER — ONDANSETRON 4 MG/1
4 TABLET, FILM COATED ORAL EVERY 8 HOURS PRN
Status: ON HOLD | COMMUNITY
End: 2021-08-25

## 2021-01-13 RX ORDER — POLYETHYLENE GLYCOL 3350 17 G/17G
17 POWDER, FOR SOLUTION ORAL DAILY PRN
Status: DISCONTINUED | OUTPATIENT
Start: 2021-01-13 | End: 2021-01-19

## 2021-01-13 RX ORDER — ONDANSETRON 2 MG/ML
4 INJECTION INTRAMUSCULAR; INTRAVENOUS EVERY 4 HOURS PRN
Status: DISCONTINUED | OUTPATIENT
Start: 2021-01-13 | End: 2021-01-13

## 2021-01-13 RX ORDER — ATORVASTATIN CALCIUM 40 MG/1
40 TABLET, FILM COATED ORAL DAILY
Status: DISCONTINUED | OUTPATIENT
Start: 2021-01-13 | End: 2021-01-19

## 2021-01-13 RX ORDER — SODIUM PHOSPHATE, DIBASIC AND SODIUM PHOSPHATE, MONOBASIC 7; 19 G/133ML; G/133ML
1 ENEMA RECTAL ONCE AS NEEDED
Status: DISCONTINUED | OUTPATIENT
Start: 2021-01-13 | End: 2021-01-19

## 2021-01-13 RX ORDER — OMEPRAZOLE 20 MG/1
20 CAPSULE, DELAYED RELEASE ORAL 2 TIMES DAILY
Status: ON HOLD | COMMUNITY
End: 2021-08-25

## 2021-01-13 RX ORDER — ONDANSETRON 2 MG/ML
4 INJECTION INTRAMUSCULAR; INTRAVENOUS EVERY 6 HOURS PRN
Status: DISCONTINUED | OUTPATIENT
Start: 2021-01-13 | End: 2021-01-19

## 2021-01-13 RX ORDER — HEPARIN SODIUM 5000 [USP'U]/ML
5000 INJECTION, SOLUTION INTRAVENOUS; SUBCUTANEOUS EVERY 8 HOURS SCHEDULED
Status: DISCONTINUED | OUTPATIENT
Start: 2021-01-13 | End: 2021-01-19

## 2021-01-13 RX ORDER — AMIODARONE HYDROCHLORIDE 200 MG/1
200 TABLET ORAL DAILY
Status: DISCONTINUED | OUTPATIENT
Start: 2021-01-14 | End: 2021-01-19

## 2021-01-13 RX ORDER — SODIUM CHLORIDE, SODIUM LACTATE, POTASSIUM CHLORIDE, CALCIUM CHLORIDE 600; 310; 30; 20 MG/100ML; MG/100ML; MG/100ML; MG/100ML
INJECTION, SOLUTION INTRAVENOUS CONTINUOUS
Status: DISCONTINUED | OUTPATIENT
Start: 2021-01-13 | End: 2021-01-13

## 2021-01-13 RX ORDER — BISACODYL 10 MG
10 SUPPOSITORY, RECTAL RECTAL DAILY
Status: ON HOLD | COMMUNITY
End: 2021-08-20

## 2021-01-13 RX ORDER — ASPIRIN 300 MG/1
300 SUPPOSITORY RECTAL ONCE
Status: COMPLETED | OUTPATIENT
Start: 2021-01-13 | End: 2021-01-13

## 2021-01-13 RX ORDER — BISACODYL 10 MG
10 SUPPOSITORY, RECTAL RECTAL
Status: DISCONTINUED | OUTPATIENT
Start: 2021-01-13 | End: 2021-01-19

## 2021-01-13 RX ORDER — ACETAMINOPHEN 325 MG/1
650 TABLET ORAL EVERY 6 HOURS PRN
Status: DISCONTINUED | OUTPATIENT
Start: 2021-01-13 | End: 2021-01-19

## 2021-01-13 RX ORDER — SODIUM CHLORIDE 9 MG/ML
INJECTION, SOLUTION INTRAVENOUS ONCE
Status: COMPLETED | OUTPATIENT
Start: 2021-01-13 | End: 2021-01-13

## 2021-01-13 RX ORDER — AMIODARONE HYDROCHLORIDE 200 MG/1
400 TABLET ORAL DAILY
Status: DISCONTINUED | OUTPATIENT
Start: 2021-01-13 | End: 2021-01-13

## 2021-01-13 RX ORDER — MAGNESIUM HYDROXIDE/ALUMINUM HYDROXICE/SIMETHICONE 120; 1200; 1200 MG/30ML; MG/30ML; MG/30ML
5 SUSPENSION ORAL 4 TIMES DAILY PRN
Status: ON HOLD | COMMUNITY
Start: 2021-02-12 | End: 2021-08-25

## 2021-01-13 NOTE — PLAN OF CARE
Assumed care at 7:30 am.  Cardiac monitoring. NPO. Completed admission navigator w/Daughter Jose Alberto Dean at bedside. Plan of care/ isolation education provided to patient and daughter.   Hospitalist notified of emesis during speech eval, orders to place NG tu

## 2021-01-13 NOTE — CONSULTS
[facility]    Report of Consultation    Debra Moore Patient Status:  Inpatient    1945 MRN [id number]   [facility] 3NE-A Attending Tess Doshi [facility] Day # 0 PCP Elgin Price MD     Date of Admission:   magnesium hydroxide (MILK OF MAGNESIA) 400 MG/5ML suspension 30 mL, 30 mL, Oral, Daily PRN  •  bisacodyl (DULCOLAX) rectal suppository 10 mg, 10 mg, Rectal, Daily PRN  •  Fleet Enema (FLEET) 7-19 GM/118ML enema 133 mL, 1 enema, Rectal, Once PRN  •  Piperac Date    INR 1.01 12/26/2020    INR 1.06 10/05/2020       Assessment/Plan:  Patient Active Problem List:     Palpitations     AICD discharge     S/P CABG (coronary artery bypass graft)     Ischemic cardiomyopathy     V-tach [facility])     CAD in native artery

## 2021-01-13 NOTE — SLP NOTE
ADULT SWALLOWING EVALUATION    ASSESSMENT    ASSESSMENT/OVERALL IMPRESSION:  Patient is a 68 y/o male admitted with Ileus of unspecified type and PMHx significant for CVA with residual right-sided deficits and aphasia.  Discussed with RN prior to my visit w Recommendations/Plan: Assisted living    HISTORY   MEDICAL HISTORY  Reason for Referral: R/O aspiration    Problem List  Principal Problem:    Ileus of unspecified type ([facility] [address]  Active Problems:    Intractable vomiting    Intractable vomiting with nausea, un liquids;Puree; Soft solid;Hard solid  Method of Presentation: Self presentation;Staff/Clinician assistance  Patient Positioning: Upright;Midline    Oral Phase of Swallow: Impaired  Bolus Retrieval: Impaired  Bilabial Seal: Intact  Bolus Formation: Impaired

## 2021-01-13 NOTE — CM/SW NOTE
01/13/21 1200   CM/SW Referral Data   Referral Source Social Work (self-referral)   Reason for Referral Discharge planning   Informant Patient     MSW met with pt at bedside, he only nodded his head, he does plan to return to faisal Sylvia maría at [facility] [place]

## 2021-01-13 NOTE — ED PROVIDER NOTES
Patient Seen in: [facility] Emergency Department      History   Patient presents with:  Nausea/Vomiting/Diarrhea    Stated Complaint: vomiting    HPI/Subjective:   HPI    57-year-old with past medical history of CVA with residual weakness, hypertens Mucous membranes are moist.   Eyes:      Extraocular Movements: Extraocular movements intact. Pupils: Pupils are equal, round, and reactive to light. Neck:      Musculoskeletal: Neck supple.    Cardiovascular:      Rate and Rhythm: Normal rate and re AST, ALP, CA, CREA, BUN, GLUC,           Resulted by: [id number]: CTNI,    CBC W/ DIFFERENTIAL - Abnormal; Notable for the following components:    RDW 17.0 (*)     RDW-SD 59.6 (*)     All other components within normal limits   LIPASE - Normal   RAPID SARS-CO Given the concern for aspiration, plan for Zosyn. Will get chest x-ray to assess for consolidation, edema, or effusion. Will send CBC, lactic acid, blood culture assess for sepsis. Plan for troponin, belly labs, along with lites.   Will give fluids and

## 2021-01-13 NOTE — HISTORICAL OFFICE NOTE
Progress Notes  - documented in this encounter  Shelli Russell CNP - 01/08/2021 2:00 PM CST  Formatting of this note might be different from the original.  Due to [address], the patient's office visit was converted to a phone visit.     This patien reduced. The estimated ejection fraction  was 15-20%. Severe diffuse hypokinesis with regional variations. Doppler parameters are consistent with abnormal left ventricular relaxation -grade 1 diastolic dysfunction.   2. Right ventricle: Pacer C/W ICD noted

## 2021-01-13 NOTE — TELEPHONE ENCOUNTER
Spoke earlier today with daughter, giving her an update on her father's care in regards to the adynamic ileus. She was onboard with everything we were doing, IVFs, NPO ok to give cardiac meds.       At noon today, found out the patient was sent to the ED f

## 2021-01-13 NOTE — H&P
[facility]  History and Physical     Esdras Kulkarni Patient Status:  Emergency    1945 MRN [id number]   Location [address] [facility] Attending Linus Presser Day # 0 PCP Brian Ferrera MD     Chief Complai Disp: , Rfl:     •  acetaminophen 325 MG Oral Tab, Take 650 mg by mouth every 6 (six) hours as needed for Pain., Disp: , Rfl:     •  ergocalciferol 1.25 MG (23192 UT) Oral Cap, Take 50,000 Units by mouth once a week., Disp: , Rfl:     •  amiodarone HCl 400 AST 61*   ALT 69*   BILT 1.1   TP 8.8*       Estimated Creatinine Clearance: 42.1 mL/min (A) (based on SCr of 1.34 mg/dL (H)). No results for input(s): PTP, INR in the last 168 hours.     Recent Labs   Lab 01/13/21  0041   TROP 3.000*       Imaging: Im

## 2021-01-13 NOTE — SLP NOTE
SLP order received to evaluate oropharyngeal swallow function d/t concern for aspiration. Discussed with RN, evaluation held per RN due to concern for ileus and patient experiencing nausea and vomiting. SLP to evaluate when medically appropriate.

## 2021-01-13 NOTE — ED INITIAL ASSESSMENT (HPI)
Pt arrives via EMS from [facility], reports vomiting for several days, noticed crackles tonight, possible aspiration, 88% RA, placed patient on 4L NC.

## 2021-01-13 NOTE — PHYSICAL THERAPY NOTE
PHYSICAL THERAPY EVALUATION - INPATIENT     Room Number: 6747/0327-K  Evaluation Date: 1/13/2021  Type of Evaluation: Initial  Physician Order: PT Eval and Treat    Presenting Problem: Ileus of unspecified type  Reason for Therapy: Mobility Dysfuncti Laterality Date   • BYPASS SURGERY      2007   • CABG      2/1994   • CARDIAC PACEMAKER PLACEMENT     • OTHER      prostate seeds implanted 2014 for CA       HOME SITUATION  Type of Home: [address]: One level  Stairs to Enter : 0 NEUROLOGICAL FINDINGS                      ACTIVITY TOLERANCE           BP: 97/54  BP Location: Left arm  BP Method: Automatic  Patient Position: Lying supine    31/23 seated EOB, no symptomatic report from pt therefore likely poor BP reading  97/54 driscoll bed to supine and called RN due to low BP. Left pt in bed supine position with bed alarm intact and RN aware.     Exercise/Education Provided:  Bed mobility  Body mechanics  Energy conservation    Patient End of Session: In bed;Needs met;Call light within r transfers EOB to/from [facility] at assistance level: moderate assistance     Goal #3 Patient is able to sit EOB with UE support for 5 minutes at modified independence.      Goal #4    Goal #5    Goal #6    Goal Comments: Goals established on 1/13/2021    DIANE Sanabria

## 2021-01-13 NOTE — ED NOTES
Report given to CTU RN. Pt remained stable. RAC IV line infiltrated , reinserted.  #2 Lactic acid specimen sent to lab

## 2021-01-14 LAB
ALBUMIN SERPL-MCNC: 2.7 G/DL (ref 3.4–5)
ALBUMIN/GLOB SERPL: 0.5 {RATIO} (ref 1–2)
ALP LIVER SERPL-CCNC: 74 U/L
ALT SERPL-CCNC: 43 U/L
ANION GAP SERPL CALC-SCNC: 9 MMOL/L (ref 0–18)
AST SERPL-CCNC: 47 U/L (ref 15–37)
BILIRUB SERPL-MCNC: 1 MG/DL (ref 0.1–2)
BUN BLD-MCNC: 30 MG/DL (ref 7–18)
BUN/CREAT SERPL: 25.6 (ref 10–20)
C DIFF TOX B STL QL: NEGATIVE
CALCIUM BLD-MCNC: 8.8 MG/DL (ref 8.5–10.1)
CHLORIDE SERPL-SCNC: 107 MMOL/L (ref 98–112)
CO2 SERPL-SCNC: 20 MMOL/L (ref 21–32)
CREAT BLD-MCNC: 1.17 MG/DL
DEPRECATED RDW RBC AUTO: 61.3 FL (ref 35.1–46.3)
ERYTHROCYTE [DISTWIDTH] IN BLOOD BY AUTOMATED COUNT: 17.1 % (ref 11–15)
GLOBULIN PLAS-MCNC: 5.1 G/DL (ref 2.8–4.4)
GLUCOSE BLD-MCNC: 122 MG/DL (ref 70–99)
GLUCOSE BLD-MCNC: 128 MG/DL (ref 70–99)
GLUCOSE BLD-MCNC: 130 MG/DL (ref 70–99)
GLUCOSE BLD-MCNC: 131 MG/DL (ref 70–99)
HAV IGM SER QL: 2.7 MG/DL (ref 1.6–2.6)
HCT VFR BLD AUTO: 38.6 %
HGB BLD-MCNC: 12.2 G/DL
M PROTEIN MFR SERPL ELPH: 7.8 G/DL (ref 6.4–8.2)
MCH RBC QN AUTO: 30.8 PG (ref 26–34)
MCHC RBC AUTO-ENTMCNC: 31.6 G/DL (ref 31–37)
MCV RBC AUTO: 97.5 FL
OSMOLALITY SERPL CALC.SUM OF ELEC: 290 MOSM/KG (ref 275–295)
PLATELET # BLD AUTO: 198 10(3)UL (ref 150–450)
POTASSIUM SERPL-SCNC: 3.3 MMOL/L (ref 3.5–5.1)
RBC # BLD AUTO: 3.96 X10(6)UL
SODIUM SERPL-SCNC: 136 MMOL/L (ref 136–145)
WBC # BLD AUTO: 11.6 X10(3) UL (ref 4–11)

## 2021-01-14 PROCEDURE — 02HV33Z INSERTION OF INFUSION DEVICE INTO SUPERIOR VENA CAVA, PERCUTANEOUS APPROACH: ICD-10-PCS | Performed by: HOSPITALIST

## 2021-01-14 PROCEDURE — B548ZZA ULTRASONOGRAPHY OF SUPERIOR VENA CAVA, GUIDANCE: ICD-10-PCS | Performed by: HOSPITALIST

## 2021-01-14 PROCEDURE — 4B02XTZ MEASUREMENT OF CARDIAC DEFIBRILLATOR, EXTERNAL APPROACH: ICD-10-PCS | Performed by: INTERNAL MEDICINE

## 2021-01-14 PROCEDURE — 99232 SBSQ HOSP IP/OBS MODERATE 35: CPT | Performed by: HOSPITALIST

## 2021-01-14 RX ORDER — POTASSIUM CHLORIDE 29.8 MG/ML
40 INJECTION INTRAVENOUS ONCE
Status: DISCONTINUED | OUTPATIENT
Start: 2021-01-14 | End: 2021-01-14 | Stop reason: SDUPTHER

## 2021-01-14 RX ORDER — METOPROLOL TARTRATE 5 MG/5ML
INJECTION INTRAVENOUS
Status: COMPLETED
Start: 2021-01-14 | End: 2021-01-14

## 2021-01-14 RX ORDER — LIDOCAINE HYDROCHLORIDE 10 MG/ML
5 INJECTION, SOLUTION EPIDURAL; INFILTRATION; INTRACAUDAL; PERINEURAL ONCE
Status: DISCONTINUED | OUTPATIENT
Start: 2021-01-14 | End: 2021-01-19

## 2021-01-14 RX ORDER — LACTULOSE 10 G/15ML
20 SOLUTION ORAL 3 TIMES DAILY
Status: DISCONTINUED | OUTPATIENT
Start: 2021-01-14 | End: 2021-01-15

## 2021-01-14 RX ORDER — POTASSIUM CHLORIDE 20 MEQ/1
40 TABLET, EXTENDED RELEASE ORAL EVERY 4 HOURS
Status: DISCONTINUED | OUTPATIENT
Start: 2021-01-14 | End: 2021-01-14

## 2021-01-14 RX ORDER — MEXILETINE HYDROCHLORIDE 150 MG/1
150 CAPSULE ORAL EVERY 12 HOURS SCHEDULED
Status: DISCONTINUED | OUTPATIENT
Start: 2021-01-14 | End: 2021-01-19

## 2021-01-14 NOTE — PLAN OF CARE
Assumed care at 7:30 am.  Cardiac monitoring. Heparin SubQ. PIV abx running per orders. Endorsed from PM RN-patient had 4 large Bms overnight, notified hospitalist.  Cards notified 6 beats Vtach. C.diff negative.   NG removed per hospitalist, ok to advanc antiemetic medications  - Provide nonpharmacologic comfort measures as appropriate  - Advance diet as tolerated, if ordered  - Obtain nutritional consult as needed  - Evaluate fluid balance  Outcome: Progressing  Goal: Maintains or returns to baseline teodoro

## 2021-01-14 NOTE — PROGRESS NOTES
[facility] H[facility]PITAL[facility]  Progress Note     Vianney Benedict Patient Status:  Inpatient    1945 MRN [id number]   [facility] 3NE-A Attending Isaias Lafleur [facility] Day # 1 PCP Juliano Gagnon MD     Chief Complaint: Nausea vomiting    S Imaging data reviewed in Epic.     Medications:   • lactulose  20 g Oral TID   • Heparin Sodium (Porcine)  5,000 Units Subcutaneous Q8H [address]   • piperacillin-tazobactam  3.375 g Intravenous Q8H   • aspirin  81 mg Oral Daily   • atorvastatin  40 mg Oral Daily will reasonably be expected to span two midnight's based on the clinical documentation in H+P. Based on patients current state of illness, I anticipate that, after discharge, patient will require TBD.

## 2021-01-14 NOTE — VASCULAR ACCESS
VASCULAR NOTE:  Consent obtained from [facility] via phone witnessed by Karol Hayes RN and Sully Goldstein RN. cvr 45%, due to infiltrate in LUE, PICC placed in RUE which is flaccid. Both Dr. Sujit Hendricks and [facility] aware of increased risk of DVT.  Stefania

## 2021-01-14 NOTE — PROGRESS NOTES
[facility]  Cardiology Progress Note    Porsha Sher Patient Status:  Inpatient    1945 MRN [id number]   [facility] 3NE-A Attending Fe [address] Day # 1 PCP Thelma Chiang MD       Subjective:  Called to room, ALT 43 01/14/2021    TROP 2.150 01/13/2021       CXR:  CONCLUSION:  NG tube noted within the region of the gastric body below the diaphragm.     Medications:    • lactulose  20 g Oral TID   • Potassium Chloride ER  40 mEq Oral Q4H   • Heparin Sodium (Porcin Trending down. No further cardiac testing needed. · Ileus per primary-NG tube removed, +BMs  · abx for possible aspiration pna per primary. · As above, patient in sustained VT. 5mg IV lopressor given and 150mg IV amiodarone given.  Dr. Mei Esters to bedside

## 2021-01-14 NOTE — SLP NOTE
Attempted to see patient for re-evaluation of oropharyngeal swallow function. However, patient refused all PO trials presented. SLP will re-attempt 1/15. Discussed with RN.

## 2021-01-14 NOTE — PAYOR COMM NOTE
--------------  CONTINUED STAY REVIEW    Yariel Cadena MEDICARE ADV PPO  Subscriber #:  [id number]  Authorization Number: [id number]        1/13 CARDS  Compensated ischemic CM - elevated troponin is not a reflection of an unstable coronary ischemic syndrome Sodium (Porcine) 5000 UNIT/ML injection 5,000 Units     Date Action Dose Route User    1/14/2021 0315 Given 5,000 Units Subcutaneous (Left Lower Abdomen) Fanny Blackburn RN    1/13/2021 1732 Given 5,000 Units Subcutaneous (Left Lower Abdomen) Saeed Richter

## 2021-01-14 NOTE — PROGRESS NOTES
Pt Alert to self, pt will Nod head when asked questions. Room Air  Resting in Bed  Denies pain at this time  NGT placed connected to low intermitted suction, no output at this time. Plan to discharge back to [facility] awaiting insurance auth.    Safet

## 2021-01-14 NOTE — OCCUPATIONAL THERAPY NOTE
OCCUPATIONAL THERAPY EVALUATION - INPATIENT     Room Number: 4901/3700-B  Evaluation Date: 1/13/2021  Type of Evaluation: Initial  Presenting Problem: Ileus, N/V, aspiration pneumonitis     Physician Order: IP Consult to Occupational Therapy  Reason for Th phone. Patient typically assists with pivot transfers using LUE to/from motorized WC. He can dress his upper body and needs assist for lower body ADLs, bathing, toileting. Patient can feed himself. He can operate WC on his own.  He lives in a 1 level apart teeth?: A Lot  -   Eating meals?: A Lot    AM-PAC Score:  Score: 10  Approx Degree of Impairment: 74.7%  Standardized Score (AM-PAC Scale): 27.31  CMS Modifier (G-Code): CL    FUNCTIONAL TRANSFER ASSESSMENT  Supine to Sit : Maximum assistance  Sit to Stand History MODERATE - Expanded review of history including review of medical or therapy record   Specific performance deficits impacting engagement in ADL/IADL MODERATE  3 - 5 performance deficits   Client Assessment/Performance Deficits MODERATE - Comorbidit

## 2021-01-15 ENCOUNTER — APPOINTMENT (OUTPATIENT)
Dept: GENERAL RADIOLOGY | Facility: HOSPITAL | Age: 76
DRG: 871 | End: 2021-01-15
Attending: HOSPITALIST
Payer: MEDICARE

## 2021-01-15 LAB
GLUCOSE BLD-MCNC: 113 MG/DL (ref 70–99)
GLUCOSE BLD-MCNC: 113 MG/DL (ref 70–99)
GLUCOSE BLD-MCNC: 116 MG/DL (ref 70–99)
GLUCOSE BLD-MCNC: 126 MG/DL (ref 70–99)
POTASSIUM SERPL-SCNC: 3.2 MMOL/L (ref 3.5–5.1)
POTASSIUM SERPL-SCNC: 3.4 MMOL/L (ref 3.5–5.1)

## 2021-01-15 PROCEDURE — 74230 X-RAY XM SWLNG FUNCJ C+: CPT | Performed by: HOSPITALIST

## 2021-01-15 RX ORDER — METOPROLOL TARTRATE 5 MG/5ML
5 INJECTION INTRAVENOUS EVERY 6 HOURS
Status: COMPLETED | OUTPATIENT
Start: 2021-01-15 | End: 2021-01-15

## 2021-01-15 RX ORDER — POTASSIUM CHLORIDE 14.9 MG/ML
20 INJECTION INTRAVENOUS ONCE
Status: COMPLETED | OUTPATIENT
Start: 2021-01-15 | End: 2021-01-15

## 2021-01-15 RX ORDER — POTASSIUM CHLORIDE 20 MEQ/1
20 TABLET, EXTENDED RELEASE ORAL DAILY
Status: DISCONTINUED | OUTPATIENT
Start: 2021-01-15 | End: 2021-01-17

## 2021-01-15 RX ORDER — CARVEDILOL 12.5 MG/1
12.5 TABLET ORAL 2 TIMES DAILY WITH MEALS
Status: DISCONTINUED | OUTPATIENT
Start: 2021-01-16 | End: 2021-01-19

## 2021-01-15 RX ORDER — POTASSIUM CHLORIDE 20 MEQ/1
40 TABLET, EXTENDED RELEASE ORAL EVERY 4 HOURS
Status: COMPLETED | OUTPATIENT
Start: 2021-01-15 | End: 2021-01-16

## 2021-01-15 NOTE — PROGRESS NOTES
[facility]  Progress Note     Shannon Leonardo Patient Status:  Inpatient    1945 MRN [id number]   [facility] 3NE-A Attending Eva [facility] Day # 2 PCP Roberta Lawernce MD     Chief Complaint: Nausea vomiting    S ALKPHO 91  --  74  --    AST 61*  --  47*  --    ALT 69*  --  43  --    BILT 1.1  --  1.0  --    TP 8.8*  --  7.8  --        Estimated Creatinine Clearance: 48.2 mL/min (based on SCr of 1.17 mg/dL).     No results for input(s): PTP, INR in the last 168 ho be referred to TCC on discharge?:  No  Estimated date of discharge: TBD   Discharge is dependent on: Clinical improvement  At this point Mr. Christal Stewart is expected to be discharge to: Home    Plan of care discussed with patient at bedside.     Celso Larkin

## 2021-01-15 NOTE — PROGRESS NOTES
[facility]  Cardiology Progress Note    Shannon Leonardo Patient Status:  Inpatient    1945 MRN [id number]   [facility] 3NE-A Attending Eva [facility] Day # 2 PCP Roberta Lawrence MD       Subjective:  Shrugs shoulder Amiodarone HCl  200 mg Oral Daily   • carvedilol  12.5 mg Oral BID with meals       Echo 12/22/20  Conclusions:     1. Left ventricle: The cavity size was increased. Wall thickness was normal.      Systolic function was markedly reduced.  The estimated ejec

## 2021-01-15 NOTE — PLAN OF CARE
Received patient awake and alert, appropriate. Has right sided paralysis from stroke. On room air, clear lungs. A- Paced. On heparin drip. Incontinent. Had loose stools from Lactulose. Accuchecks q 6hrs.  Monitor tech told me that pt had 31 beats vtach at 2 or absence of nausea and vomiting  Description: INTERVENTIONS:  - Maintain adequate hydration with IV or PO as ordered and tolerated  - Nasogastric tube to low intermittent suction as ordered  - Evaluate effectiveness of ordered antiemetic medications  - P

## 2021-01-15 NOTE — CM/SW NOTE
MSW called pt's wife who asked MSW to call pt's dtr. MSW sent referrals in aidin for FLIP. Any place will  need to get insurance auth before dc. MSW met with pt and his dtr at bedside.  At bedside Dtr asked for MSW to provide a list of all the places, if the

## 2021-01-15 NOTE — SLP NOTE
ADULT SWALLOWING EVALUATION    ASSESSMENT    ASSESSMENT/OVERALL IMPRESSION:  Patient seen for swallow evaluation at bedside. RN approved for SLP to proceed as issue regarding ileus has been resolved. Pt awake and alert. Pt agreeable to PO trials.  Attempted cardioverter/defibrillator) present    • Cancer ([address]    • High blood pressure    • High cholesterol    • Hypotension    • Stroke [facility])     2007       Prior Living Situation: Assisted living  Diet Prior to Admission: Unknown  Precautions: Rufina Hwang CCC-SLP/L, pager [phone]  Speech-LanguagePathologist

## 2021-01-15 NOTE — VIDEO SWALLOW STUDY NOTE
ADULT VIDEOFLUOROSCOPIC SWALLOWING STUDY    Admission Date: 1/13/2021  Evaluation Date: 01/15/21  Radiologist: Dr Betsey Shaw   Diet Recommendations - Solids: Mechanical soft ground  Diet Recommendations - Liquid:  Thin    Aspiration Precauti breathing  Precautions: Aspiration      Reason for Referral: R/O aspiration    Family/Patient Goals: Pt did not state    ASSESSMENT   DYSPHAGIA ASSESSMENT  Test completed in conjunction with Radiologist.  Patient Positioned: Upright;Midline;HERSON chair.   Mervat Angles Not Addressed   Goal #2 The patient/family/caregiver will demonstrate understanding and implementation of aspiration precautions and swallow strategies independently over 3 session(s).     Not Addressed   Goal #3 The patient will tolerate trial upgrade of c

## 2021-01-16 LAB
BUN BLD-MCNC: 11 MG/DL (ref 7–18)
CALCIUM BLD-MCNC: 8.1 MG/DL (ref 8.5–10.1)
CHLORIDE SERPL-SCNC: 114 MMOL/L (ref 98–112)
CO2 SERPL-SCNC: 22 MMOL/L (ref 21–32)
CREAT BLD-MCNC: 0.77 MG/DL
GLUCOSE BLD-MCNC: 96 MG/DL (ref 70–99)
HAV IGM SER QL: 2.3 MG/DL (ref 1.6–2.6)
POTASSIUM SERPL-SCNC: 3.8 MMOL/L (ref 3.5–5.1)
POTASSIUM SERPL-SCNC: 3.8 MMOL/L (ref 3.5–5.1)
SODIUM SERPL-SCNC: 142 MMOL/L (ref 136–145)

## 2021-01-16 PROCEDURE — 99232 SBSQ HOSP IP/OBS MODERATE 35: CPT | Performed by: HOSPITALIST

## 2021-01-16 RX ORDER — POTASSIUM CHLORIDE 20 MEQ/1
40 TABLET, EXTENDED RELEASE ORAL ONCE
Status: COMPLETED | OUTPATIENT
Start: 2021-01-16 | End: 2021-01-16

## 2021-01-16 NOTE — CM/SW NOTE
Sw left message for Darline Mock ( daughter ) to discuss FLIP choices.     Angela Nevarez, [facility]  /Discharge Planner  [phone]

## 2021-01-16 NOTE — PROGRESS NOTES
[facility]  Cardiology Progress Note    Debra Lomax Patient Status:  Inpatient    1945 MRN [id number]   [facility] 3NE-A Attending Tess D[facility] Day # 3 PCP Elgin Price MD       Subjective:  No cp or sob bu • piperacillin-tazobactam  3.375 g Intravenous Q8H   • aspirin  81 mg Oral Daily   • atorvastatin  40 mg Oral Daily   • Amiodarone HCl  200 mg Oral Daily       Echo 12/22/20  Conclusions:     1. Left ventricle: The cavity size was increased.  Wall thickne

## 2021-01-16 NOTE — CM/SW NOTE
Alka emailed FLIP list to daughter at [email or web address]. She will discuss with her father and also may consider home with Beck Calzada. Pt would need C RN, PT, OT, and ST. Pt and wife has 15 hours per week of caregiver services through [facility] A[facility].   He has

## 2021-01-16 NOTE — PLAN OF CARE
Assumed pt care at 0730. R PICC, unit draw. IV zosyn. Cardiac diet, ground with thin liquids. Meds crushed in applesauce. Electrolyte protocol, K+ replaced. A/O x2, confused at times. On RA, O2 via NC PRN. A-paced on tele.  ICD reset to shock at 130 bpm. No ordered antiemetic medications  - Provide nonpharmacologic comfort measures as appropriate  - Advance diet as tolerated, if ordered  - Obtain nutritional consult as needed  - Evaluate fluid balance  Outcome: Progressing  Goal: Maintains or returns to basel

## 2021-01-16 NOTE — PROGRESS NOTES
[facility]  Progress Note     Luz Elena Sunshine Patient Status:  Inpatient    1945 MRN [id number]   [facility] 3NE-A Attending Marco Antonio [facility] Day # 3 PCP Portia Angel MD     Chief Complaint: Nausea and vomiting 74  --   --   --    AST 61*  --  47*  --   --   --    ALT 69*  --  43  --   --   --    BILT 1.1  --  1.0  --   --   --    TP 8.8*  --  7.8  --   --   --        Estimated Creatinine Clearance: 73.5 mL/min (based on SCr of 0.77 mg/dL).     No results for inpu discontinue isolation:  Yes     Will the patient be referred to TCC on discharge?:  No  Estimated date of discharge: TBD   Discharge is dependent on: Clinical improvement  At this point Mr. Michael Manning is expected to be discharge to: [address] [place] [address],9D of care discuss

## 2021-01-16 NOTE — PLAN OF CARE
Pt Alert to self  Room Air  Resting in bed  A-paced on Tele  Electrolyte protocol  K+ being replaced on this shift  PICC line place Unit draw  Incontinent Brief in place  IV Abx Zosyn   PT/OT rec FLIP  Safety precaution maintained  Will continue to monitor toileting routine/schedule  - Consider collaborating with pharmacy to review patient's medication profile  Outcome: Progressing     Problem: Diabetes/Glucose Control  Goal: Glucose maintained within prescribed range  Description: INTERVENTIONS:  - Monitor

## 2021-01-17 LAB
POTASSIUM SERPL-SCNC: 3.2 MMOL/L (ref 3.5–5.1)
POTASSIUM SERPL-SCNC: 3.5 MMOL/L (ref 3.5–5.1)

## 2021-01-17 RX ORDER — POTASSIUM CHLORIDE 20 MEQ/1
40 TABLET, EXTENDED RELEASE ORAL EVERY 4 HOURS
Status: COMPLETED | OUTPATIENT
Start: 2021-01-17 | End: 2021-01-17

## 2021-01-17 RX ORDER — AMOXICILLIN AND CLAVULANATE POTASSIUM 875; 125 MG/1; MG/1
1 TABLET, FILM COATED ORAL 2 TIMES DAILY
Qty: 10 TABLET | Refills: 0 | Status: SHIPPED | OUTPATIENT
Start: 2021-01-17 | End: 2021-01-22

## 2021-01-17 RX ORDER — POTASSIUM CHLORIDE 20 MEQ/1
40 TABLET, EXTENDED RELEASE ORAL DAILY
Status: DISCONTINUED | OUTPATIENT
Start: 2021-01-18 | End: 2021-01-19

## 2021-01-17 NOTE — CM/SW NOTE
CM contacted Sonam Raphael to discuss discharge plan. Per daughter, she is going to discuss plan of care with her mother and get back to me by 4 pm with their decision. Current dc plan is FLIP vs home with DME.     Irene Wilson, RN, BSN

## 2021-01-17 NOTE — PLAN OF CARE
Difficult to assess orientation level. Patient knows name but unable to understand patient's speech when asked about time and place. He is able to answer yes and no questions. Denies pain. RA. A-paced on tele. IV abx.   Mech soft ground, thin liquid d hydration with IV or PO as ordered and tolerated  - Evaluate effectiveness of GI medications  - Encourage mobilization and activity  - Obtain nutritional consult as needed  - Establish a toileting routine/schedule  - Consider collaborating with pharmacy to

## 2021-01-17 NOTE — PLAN OF CARE
Assumed pt care at 0730. A&Ox1, incomprehensible speech. VSS. Room air. A-paced on tele with occasional PVC's noted. History of R sided paralysis. Cardiac ground diet, denies N/V. Denies pain. Voiding, briefed & incontinent. R PICC SL, unit draw for labs. as ordered  - Evaluate effectiveness of ordered antiemetic medications  - Provide nonpharmacologic comfort measures as appropriate  - Advance diet as tolerated, if ordered  - Obtain nutritional consult as needed  - Evaluate fluid balance  Outcome: Progress

## 2021-01-17 NOTE — PROGRESS NOTES
[facility]  Cardiology Progress Note    Porsha Sher Patient Status:  Inpatient    1945 MRN [id number]   [facility] 3NE-A Attending Fe Over[address] Day # 4 PCP Thelma Chiang MD       Subjective:   When asked if h Subcutaneous Q8H [address]   • piperacillin-tazobactam  3.375 g Intravenous Q8H   • aspirin  81 mg Oral Daily   • atorvastatin  40 mg Oral Daily   • Amiodarone HCl  200 mg Oral Daily       Echo 12/22/20  Conclusions:     1. Left ventricle:  The cavity size was in

## 2021-01-17 NOTE — PLAN OF CARE
Pt Alert to self  Room Air  Resting in bed  A-paced on Tele  Electrolyte protocol  K replaced Am shift redraw in Am  PICC line place Unit draw  Incontinent Brief in place  IV Abx Zosyn   PT/OT rec FLIP  Isolation in place(enhence precaution)  Awaiting place tolerated  - Evaluate effectiveness of GI medications  - Encourage mobilization and activity  - Obtain nutritional consult as needed  - Establish a toileting routine/schedule  - Consider collaborating with pharmacy to review patient's medication profile  O

## 2021-01-17 NOTE — CM/SW NOTE
CM received call from [facility]- daughter #[phone]. She would like her father to go to [address] [place] [address] Road when medically cleared. Facility reserved in [address] [place] [address].     Jacob Wilkins RN, BSN   [phone]

## 2021-01-17 NOTE — PROGRESS NOTES
[facility]  Progress Note     Luhie Gowers Patient Status:  Inpatient    1945 MRN [id number]   [facility] 3NE-A Attending Елена [facility] Day # 4 PCP Calli Luke MD     Chief Complaint: Nausea and vomiting --   --  114*  --    CO2 23.0 17.0* 20.0*  --   --  22.0  --    ALKPHO 91  --  74  --   --   --   --    AST 61*  --  47*  --   --   --   --    ALT 69*  --  43  --   --   --   --    BILT 1.1  --  1.0  --   --   --   --    TP 8.8*  --  7.8  --   --   --   -- sided deficits  12. Hx of prostate cancer     Plan of care: As above.     Quality:  · DVT Prophylaxis: SCDs  · CODE status: Full  · Jaeger: None  · Central line: None  · If COVID testing is negative, may discontinue isolation:  Yes     Will the patient be re

## 2021-01-18 ENCOUNTER — APPOINTMENT (OUTPATIENT)
Dept: GENERAL RADIOLOGY | Facility: HOSPITAL | Age: 76
DRG: 871 | End: 2021-01-18
Attending: HOSPITALIST
Payer: MEDICARE

## 2021-01-18 ENCOUNTER — APPOINTMENT (OUTPATIENT)
Dept: CT IMAGING | Facility: HOSPITAL | Age: 76
DRG: 871 | End: 2021-01-18
Attending: HOSPITALIST
Payer: MEDICARE

## 2021-01-18 LAB
ATRIAL RATE: 70 BPM
P AXIS: 4 DEGREES
P-R INTERVAL: 218 MS
POTASSIUM SERPL-SCNC: 3.1 MMOL/L (ref 3.5–5.1)
POTASSIUM SERPL-SCNC: 3.7 MMOL/L (ref 3.5–5.1)
Q-T INTERVAL: 444 MS
QRS DURATION: 104 MS
QTC CALCULATION (BEZET): 479 MS
R AXIS: 35 DEGREES
T AXIS: -40 DEGREES
VENTRICULAR RATE: 70 BPM

## 2021-01-18 PROCEDURE — 99232 SBSQ HOSP IP/OBS MODERATE 35: CPT | Performed by: HOSPITALIST

## 2021-01-18 PROCEDURE — 74176 CT ABD & PELVIS W/O CONTRAST: CPT | Performed by: HOSPITALIST

## 2021-01-18 PROCEDURE — 74018 RADEX ABDOMEN 1 VIEW: CPT | Performed by: HOSPITALIST

## 2021-01-18 RX ORDER — SPIRONOLACTONE 25 MG/1
12.5 TABLET ORAL DAILY
Status: DISCONTINUED | OUTPATIENT
Start: 2021-01-18 | End: 2021-01-19

## 2021-01-18 RX ORDER — POTASSIUM CHLORIDE 14.9 MG/ML
20 INJECTION INTRAVENOUS ONCE
Status: COMPLETED | OUTPATIENT
Start: 2021-01-18 | End: 2021-01-18

## 2021-01-18 RX ORDER — POTASSIUM CHLORIDE 20 MEQ/1
40 TABLET, EXTENDED RELEASE ORAL ONCE
Status: COMPLETED | OUTPATIENT
Start: 2021-01-18 | End: 2021-01-18

## 2021-01-18 NOTE — PLAN OF CARE
Resumed pt care at 0730. A&Ox1, incomprehensible speech. VSS. Room air. A-paced on tele with occasional PVC's noted. History of R sided paralysis. Cardiac ground diet, denies N/V. Denies pain. Voiding, briefed & incontinent. R PICC SL, unit draw for labs. adequate hydration with IV or PO as ordered and tolerated  - Nasogastric tube to low intermittent suction as ordered  - Evaluate effectiveness of ordered antiemetic medications  - Provide nonpharmacologic comfort measures as appropriate  - Advance diet as

## 2021-01-18 NOTE — CM/SW NOTE
9am  Pt discussed in care rounds, MSW sent message in [address] that pt should dc on 1/19 and to start auth process. 11:20am  MSW left message for Dtr, dtr called back.  MSW updated her that Iggy Duff is not in network and are checking pt's out of network statu

## 2021-01-18 NOTE — PROGRESS NOTES
[facility]  Cardiology Progress Note    Subjective:  No chest pain or shortness of breath. Potassium remains low today.     Objective:  /52 (BP Location: Left arm)   Pulse 70   Temp 98.9 °F (37.2 °C) (Oral)   Resp 20   Wt 140 lb 6.4 oz (63.7 kg) rate 150, treated with overdrive pacing by dr Natalya Yeboah. Detection rate lowered to 130. Mexilitine added. No further prolonged events but still with v-ectopy when K+ low. · Mildly elevated trop with known cad/hxCABG- not c/w acs, no further work up.   Shamir Ontiveros

## 2021-01-18 NOTE — PROGRESS NOTES
[facility]  Progress Note     Brant Clarke Patient Status:  Inpatient    1945 MRN [id number]   [facility] 3NE-A Attending [place] [facility] Day # 5 PCP Cora Powers MD     Chief Complaint: Don Shepherd --   --   --    K 5.0 5.1 3.3*   < > 3.8  3.8 3.2* 3.5 3.1*    107 107  --  114*  --   --   --    CO2 23.0 17.0* 20.0*  --  22.0  --   --   --    ALKPHO 91  --  74  --   --   --   --   --    AST 61*  --  47*  --   --   --   --   --    ALT 69*  --  43 given hypotension. 8. afib  - amiodarone, baby Asprin   9. HTN - Carvedilol  10. HLD - Lipitor   11. Hx of CVA w/ residual R sided deficits. 12. Hx of prostate cancer.     Plan of care: As above.     Quality:  · DVT Prophylaxis: SCDs  · CODE status:  Full

## 2021-01-18 NOTE — PLAN OF CARE
Patient A&O, lungs diminished, no c/o pain. 0100 PCT changed patient and patient's HR went into the 130s. It sustained for at least 20 mins. Nurse spoke with Dr. Reinaldo Buck, and decided to give held coreg from earlier in the evening.   Patient's HR stayed e Obtain nutritional consult as needed  - Establish a toileting routine/schedule  - Consider collaborating with pharmacy to review patient's medication profile  Outcome: Progressing     Problem: Diabetes/Glucose Control  Goal: Glucose maintained within presc

## 2021-01-18 NOTE — PHYSICAL THERAPY NOTE
PHYSICAL THERAPY TREATMENT NOTE - INPATIENT    Room Number: 1262/1625-U     Session: 1  Number of Visits to Meet Established Goals: 3    Presenting Problem: Ileus of unspecified type     History related to current admission: Pt is 67year old male admitte • OTHER      prostate seeds implanted 2014 for CA       SUBJECTIVE  \"I feel dizzy\"    Patient’s self-stated goal is none stated    OBJECTIVE  Precautions: Bed/chair alarm; Other (Comment)(R bill )    WEIGHT BEARING RESTRICTION  Weight Bearing Restrictio therapeutic exercise from this position. Turned on lights to increase arousal. Performed L LE exercises actively to promote increased blood flow and strengthening. Performed R LE exercises passively to decrease tissue tightness and promote mobility.  Worked gloves, gown, surgical mask ,and goggles. Pt wore surgical mask throughout session.

## 2021-01-19 VITALS
DIASTOLIC BLOOD PRESSURE: 58 MMHG | BODY MASS INDEX: 21 KG/M2 | OXYGEN SATURATION: 96 % | RESPIRATION RATE: 20 BRPM | HEART RATE: 73 BPM | WEIGHT: 149 LBS | TEMPERATURE: 99 F | SYSTOLIC BLOOD PRESSURE: 97 MMHG

## 2021-01-19 LAB
PLATELET # BLD AUTO: 216 10(3)UL (ref 150–450)
POTASSIUM SERPL-SCNC: 3.7 MMOL/L (ref 3.5–5.1)

## 2021-01-19 PROCEDURE — 99239 HOSP IP/OBS DSCHRG MGMT >30: CPT | Performed by: HOSPITALIST

## 2021-01-19 RX ORDER — POLYETHYLENE GLYCOL 3350 17 G/17G
17 POWDER, FOR SOLUTION ORAL DAILY PRN
Qty: 30 EACH | Refills: 0 | Status: SHIPPED | OUTPATIENT
Start: 2021-01-19 | End: 2021-02-18

## 2021-01-19 RX ORDER — POTASSIUM CHLORIDE 20 MEQ/1
40 TABLET, EXTENDED RELEASE ORAL ONCE
Status: COMPLETED | OUTPATIENT
Start: 2021-01-19 | End: 2021-01-19

## 2021-01-19 RX ORDER — SPIRONOLACTONE 25 MG/1
12.5 TABLET ORAL DAILY
Qty: 15 TABLET | Refills: 3 | Status: ON HOLD | OUTPATIENT
Start: 2021-01-20 | End: 2021-05-28

## 2021-01-19 RX ORDER — POTASSIUM CHLORIDE 20 MEQ/1
40 TABLET, EXTENDED RELEASE ORAL DAILY
Qty: 60 TABLET | Refills: 3 | Status: ON HOLD | OUTPATIENT
Start: 2021-01-20 | End: 2021-08-25

## 2021-01-19 RX ORDER — AMIODARONE HYDROCHLORIDE 200 MG/1
200 TABLET ORAL DAILY
Qty: 30 TABLET | Refills: 3 | Status: ON HOLD | OUTPATIENT
Start: 2021-01-20 | End: 2021-05-28

## 2021-01-19 RX ORDER — DOCUSATE SODIUM 100 MG/1
100 CAPSULE, LIQUID FILLED ORAL 2 TIMES DAILY
Qty: 60 CAPSULE | Refills: 0 | Status: ON HOLD | OUTPATIENT
Start: 2021-01-19 | End: 2021-08-20

## 2021-01-19 RX ORDER — MEXILETINE HYDROCHLORIDE 150 MG/1
150 CAPSULE ORAL EVERY 12 HOURS SCHEDULED
Qty: 60 CAPSULE | Refills: 3 | Status: ON HOLD | OUTPATIENT
Start: 2021-01-19 | End: 2021-08-25

## 2021-01-19 NOTE — DISCHARGE SUMMARY
[facility] HOSPITALIST  DISCHARGE SUMMARY     [facility] Patient Status:  Inpatient    1945 MRN [id number]   [facility] 3NE-A Attending Ludy Whitaker [facility] Day # 6 PCP Homero Venegas MD     Date of Admission: 2021  Ander further rehab. Lace+ Score: 79  59-90 High Risk  29-58 Medium Risk  0-28   Low Risk         TCM Follow-Up Recommendation:  LACE > 58:  High Risk of readmission after discharge from the hospital.    Procedures during hospitalization:   • None     Incide known as: PRILOSEC      Take 20 mg by mouth 2 (two) times a day. Refills: 0     Ondansetron HCl 4 mg tablet  Commonly known as: ZOFRAN      Take 4 mg by mouth every 8 (eight) hours as needed for Nausea.    Refills: 0     sodium chloride 0.9%      Inject 7 edema.  -----------------------------------------------------------------------------------------------  PATIENT DISCHARGE INSTRUCTIONS: See electronic chart    Kelsie Nguyen DO    Time spent:  > 30 minutes

## 2021-01-19 NOTE — CM/SW NOTE
10:00am  Pt discussed in care rounds, and is medically cleared. MSW called [facility] to set up transport, message left. 11:08am  MSW sent updated PT notes to [facility], Per [employer] is still pending.     12pm  MSW reviewed chart, Insurance aut

## 2021-01-19 NOTE — PROGRESS NOTES
[facility]  Cardiology Progress Note    Subjective:  No chest pain or shortness of breath. Having short runs of WCT, but nothing sustaining. Potassium has improved.     Objective:  BP 97/58 (BP Location: Left arm)   Pulse 73   Temp 99.1 °F (37.3 °C) · HTN  · HL-statin  · Recurrent hypokalemia- daily replacement orderered yesterday. K+ 3.1 today. Will continue Donavon Xuan, replace with extra, and add spironolactone 12.5mg PO daily.     Plan:    - Potassium improved with initiation of Kdur and spironol alert/follows commands

## 2021-01-19 NOTE — PLAN OF CARE
Pt is A&O x1-2, has some slurred/incomprehensible speech at times but he is able to respond yes/no to questions. Pt has hx of CVA with right sided paralysis and loss of sensation. VSS- Atrial paced on tele. Cardiology following case.  Potassium replaced per to assist with strengthening/mobility  - Encourage toileting schedule  Outcome: Progressing     Problem: GASTROINTESTINAL - ADULT  Goal: Minimal or absence of nausea and vomiting  Description: INTERVENTIONS:  - Maintain adequate hydration with IV or PO as

## 2021-01-19 NOTE — OCCUPATIONAL THERAPY NOTE
Attempted to see pt this PM for OT tx session. Pt received supine in bed. Pt reporting 6/10 pain in abdomen and refusing all out of bed and in bed activity. Pt requesting to rest at this time.  Pt declined writer offering to contact RN for pain relief medic

## 2021-01-19 NOTE — PLAN OF CARE
Patient is alert and awake this morning. VSS, RA. Atrial paced on tele. R arm precaution from R arm PICC.  IV antibiotics continued - blood cultures resulted positive for gram positive rods - MD notified - no further interventions required as this is contam

## 2021-01-20 NOTE — PAYOR COMM NOTE
--------------  DISCHARGE REVIEW    Payor: [employer]  Subscriber #:  [id number]  Authorization Number: [id number]    Admit date: 1/13/21  Admit time:  0616  Discharge Date: 1/19/2021  5:55 PM     Admitting Physician: DO ARPITA Hayes

## 2021-02-09 ENCOUNTER — MED REC SCAN ONLY (OUTPATIENT)
Dept: FAMILY MEDICINE CLINIC | Facility: CLINIC | Age: 76
End: 2021-02-09

## 2021-05-20 ENCOUNTER — HOSPITAL ENCOUNTER (INPATIENT)
Facility: HOSPITAL | Age: 76
LOS: 9 days | Discharge: HOSPICE | DRG: 309 | End: 2021-05-29
Attending: EMERGENCY MEDICINE | Admitting: HOSPITALIST
Payer: MEDICARE

## 2021-05-20 ENCOUNTER — APPOINTMENT (OUTPATIENT)
Dept: GENERAL RADIOLOGY | Facility: HOSPITAL | Age: 76
DRG: 309 | End: 2021-05-20
Attending: EMERGENCY MEDICINE
Payer: MEDICARE

## 2021-05-20 DIAGNOSIS — K63.89 COLON DISTENTION: ICD-10-CM

## 2021-05-20 DIAGNOSIS — E87.6 HYPOKALEMIA: ICD-10-CM

## 2021-05-20 DIAGNOSIS — I47.2 VENTRICULAR TACHYCARDIA (HCC): Primary | ICD-10-CM

## 2021-05-20 PROBLEM — I47.20 VENTRICULAR TACHYCARDIA (HCC): Status: ACTIVE | Noted: 2021-05-20

## 2021-05-20 PROCEDURE — 74018 RADEX ABDOMEN 1 VIEW: CPT | Performed by: EMERGENCY MEDICINE

## 2021-05-20 PROCEDURE — 71045 X-RAY EXAM CHEST 1 VIEW: CPT | Performed by: EMERGENCY MEDICINE

## 2021-05-20 PROCEDURE — 99223 1ST HOSP IP/OBS HIGH 75: CPT | Performed by: HOSPITALIST

## 2021-05-20 RX ORDER — ACETAMINOPHEN 325 MG/1
650 TABLET ORAL EVERY 6 HOURS PRN
Status: DISCONTINUED | OUTPATIENT
Start: 2021-05-20 | End: 2021-05-29

## 2021-05-20 RX ORDER — METOCLOPRAMIDE HYDROCHLORIDE 5 MG/ML
10 INJECTION INTRAMUSCULAR; INTRAVENOUS EVERY 8 HOURS PRN
Status: DISCONTINUED | OUTPATIENT
Start: 2021-05-20 | End: 2021-05-29

## 2021-05-20 RX ORDER — ATORVASTATIN CALCIUM 40 MG/1
40 TABLET, FILM COATED ORAL NIGHTLY
Status: DISCONTINUED | OUTPATIENT
Start: 2021-05-20 | End: 2021-05-29

## 2021-05-20 RX ORDER — AMIODARONE HYDROCHLORIDE 50 MG/ML
INJECTION, SOLUTION INTRAVENOUS
Status: COMPLETED
Start: 2021-05-20 | End: 2021-05-20

## 2021-05-20 RX ORDER — BISACODYL 10 MG
10 SUPPOSITORY, RECTAL RECTAL
Status: DISCONTINUED | OUTPATIENT
Start: 2021-05-20 | End: 2021-05-29

## 2021-05-20 RX ORDER — HEPARIN SODIUM 5000 [USP'U]/ML
5000 INJECTION, SOLUTION INTRAVENOUS; SUBCUTANEOUS EVERY 8 HOURS SCHEDULED
Status: DISCONTINUED | OUTPATIENT
Start: 2021-05-20 | End: 2021-05-29

## 2021-05-20 RX ORDER — SPIRONOLACTONE 25 MG/1
12.5 TABLET ORAL DAILY
Status: DISCONTINUED | OUTPATIENT
Start: 2021-05-20 | End: 2021-05-24

## 2021-05-20 RX ORDER — POTASSIUM CHLORIDE 1.5 G/1.77G
40 POWDER, FOR SOLUTION ORAL ONCE
Status: COMPLETED | OUTPATIENT
Start: 2021-05-20 | End: 2021-05-20

## 2021-05-20 RX ORDER — ASPIRIN 81 MG/1
81 TABLET ORAL DAILY
Status: DISCONTINUED | OUTPATIENT
Start: 2021-05-20 | End: 2021-05-29

## 2021-05-20 RX ORDER — SODIUM PHOSPHATE, DIBASIC AND SODIUM PHOSPHATE, MONOBASIC 7; 19 G/133ML; G/133ML
1 ENEMA RECTAL ONCE AS NEEDED
Status: DISCONTINUED | OUTPATIENT
Start: 2021-05-20 | End: 2021-05-29

## 2021-05-20 RX ORDER — ONDANSETRON 2 MG/ML
4 INJECTION INTRAMUSCULAR; INTRAVENOUS EVERY 6 HOURS PRN
Status: DISCONTINUED | OUTPATIENT
Start: 2021-05-20 | End: 2021-05-29

## 2021-05-20 RX ORDER — METOPROLOL TARTRATE 5 MG/5ML
5 INJECTION INTRAVENOUS ONCE
Status: COMPLETED | OUTPATIENT
Start: 2021-05-20 | End: 2021-05-20

## 2021-05-20 RX ORDER — DOCUSATE SODIUM 100 MG/1
100 CAPSULE, LIQUID FILLED ORAL 2 TIMES DAILY
Status: DISCONTINUED | OUTPATIENT
Start: 2021-05-20 | End: 2021-05-29

## 2021-05-20 RX ORDER — POLYETHYLENE GLYCOL 3350 17 G/17G
17 POWDER, FOR SOLUTION ORAL DAILY PRN
Status: DISCONTINUED | OUTPATIENT
Start: 2021-05-20 | End: 2021-05-29

## 2021-05-20 RX ORDER — CARVEDILOL 12.5 MG/1
12.5 TABLET ORAL 2 TIMES DAILY WITH MEALS
Status: DISCONTINUED | OUTPATIENT
Start: 2021-05-20 | End: 2021-05-29

## 2021-05-20 RX ORDER — MEXILETINE HYDROCHLORIDE 150 MG/1
150 CAPSULE ORAL EVERY 12 HOURS SCHEDULED
Status: DISCONTINUED | OUTPATIENT
Start: 2021-05-20 | End: 2021-05-29

## 2021-05-20 RX ORDER — SODIUM CHLORIDE 9 MG/ML
INJECTION, SOLUTION INTRAVENOUS CONTINUOUS
Status: DISCONTINUED | OUTPATIENT
Start: 2021-05-20 | End: 2021-05-29

## 2021-05-20 RX ORDER — POTASSIUM CHLORIDE 14.9 MG/ML
20 INJECTION INTRAVENOUS ONCE
Status: COMPLETED | OUTPATIENT
Start: 2021-05-20 | End: 2021-05-20

## 2021-05-20 NOTE — CONSULTS
[facility] Cardiology  Report of Consultation    Mamie Gowers Patient Status:  Emergency    1945 MRN [id number]   Location [address] [facility] Attending Zaho Hernandez MD   Hosp Day # 0 PCP Calli Luke MD     Reason for [facility] History:   Procedure Laterality Date   • BYPASS SURGERY      2007   • CABG      2/1994   • CARDIAC PACEMAKER PLACEMENT     • OTHER      prostate seeds implanted 2014 for CA     Family History   Problem Relation Age of Onset   • Heart Disorder Father    • C Assessment/Plan:    1. Ventricular tachycardia -patient with end-stage ischemic cardiomyopathy with multiple shocks. Patient's had a similar admission a few months ago where he was found to be profoundly hypokalemic.   This appears to be the case

## 2021-05-20 NOTE — H&P
[facility]  History and Physical     Kristina Larios Patient Status:  Emergency    1945 MRN [id number]   Location [address] [facility] Attending Janessa Durand MD   Hosp Day # 0 PCP Dillon Zuniga MD     Chief Complaint: Take 1 tablet (200 mg total) by mouth daily. , Disp: 30 tablet, Rfl: 3  Mexiletine HCl 150 MG Oral Cap, Take 1 capsule (150 mg total) by mouth every 12 (twelve) hours. , Disp: 60 capsule, Rfl: 3  spironolactone 25 MG Oral Tab, Take 0.5 tablets (12.5 mg total Clear to auscultation bilaterally. No wheezes. No rhonchi. Cardiovascular: S1, S2. Regular rate and rhythm. No murmurs, rubs or gallops. Equal pulses. Chest and Back: No tenderness or deformity. Abdomen: Soft, nontender, nondistended.   Positive bowel s troponin-secondary to V. Tach. 6. Diarrhea-we will hold off on laxatives and Colace. 7. History of stroke-patient had some choking episodes with bedside swallow. Will consult speech keep nothing by mouth for now.     Quality:  · DVT Prophylaxis: Heparin

## 2021-05-20 NOTE — ED INITIAL ASSESSMENT (HPI)
Pt's care giver called 911 for patient's defibrillator shocking patient. Pt states \"not feeling well\". Pt c/o chest pain, denies c/o SOB.

## 2021-05-20 NOTE — ED QUICK NOTES
Pt arrives with diarrhea in diaper, when cleaning patient noted old dried stool on bottom and upper thighs, also diaper smelled of ammonia with old urine. Noted to have small area of skin breakdown on sacral coccyx area. Pt cleaned and placed in diaper.

## 2021-05-20 NOTE — ED PROVIDER NOTES
Patient Seen in: [facility] Emergency Department      History   Patient presents with:  Arrythmia/Palpitations    Stated Complaint: arrythmia    HPI/Subjective:   HPI    Presents with V. tach and his defibrillator shocking him.   The patient called 9 speech. HEENT: Normocephalic, atraumatic, pupils equal round and reactive to light. Neck: Supple. Cardiovascular: Regular rate and rhythm. Respiratory: Lungs clear to auscultation. Abdomen: Soft, nontender. Extremities: No CCE. Skin: Warm and dry. INDICATIONS:  colon distention on cxr  COMPARISON:  [facility] , XR, XR CHEST AP PORTABLE  (CPT=71045), 5/20/2021, 2:50 PM.  [facility] , CT, CT ABDOMEN+PELVIS(CPT=74176), 1/18/2021, 6:03 PM.  [facility] , XR, XR ABDOMEN (1 VIEW) (CPT=74018), 1/18/2021, 1:04 PM.  [facility] Left-sided pacer device and electrodes are stable. 3. Air-filled distended colon with interposition of colon between liver and diaphragm could indicate large bowel ileus. This is incompletely evaluated on a portable chest radiograph.     Dictated by (CST): family, and clinical staff.     Disposition and Plan     Clinical Impression:  Ventricular tachycardia ([facility] [address]  (primary encounter diagnosis)  Hypokalemia  Colon distention     Disposition:  Admit  5/20/2021  3:23 pm    Follow-up:  No follow-up provider speci

## 2021-05-21 ENCOUNTER — APPOINTMENT (OUTPATIENT)
Dept: GENERAL RADIOLOGY | Facility: HOSPITAL | Age: 76
DRG: 309 | End: 2021-05-21
Attending: HOSPITALIST
Payer: MEDICARE

## 2021-05-21 PROCEDURE — 74230 X-RAY XM SWLNG FUNCJ C+: CPT | Performed by: HOSPITALIST

## 2021-05-21 PROCEDURE — 99232 SBSQ HOSP IP/OBS MODERATE 35: CPT | Performed by: HOSPITALIST

## 2021-05-21 PROCEDURE — 05H633Z INSERTION OF INFUSION DEVICE INTO LEFT SUBCLAVIAN VEIN, PERCUTANEOUS APPROACH: ICD-10-PCS | Performed by: HOSPITALIST

## 2021-05-21 RX ORDER — POTASSIUM CHLORIDE 20 MEQ/1
40 TABLET, EXTENDED RELEASE ORAL EVERY 4 HOURS
Status: DISPENSED | OUTPATIENT
Start: 2021-05-21 | End: 2021-05-22

## 2021-05-21 NOTE — CM/SW NOTE
Left message for pt's dtr [place] to complete assessment - waiting for a call back. Pt lives with his wife who has dementia. Per CM, pt has been refusing PT. Has some caregiver services at home.

## 2021-05-21 NOTE — VIDEO SWALLOW STUDY NOTE
ADULT VIDEOFLUOROSCOPIC SWALLOWING STUDY    Admission Date: 5/20/2021  Evaluation Date: 05/21/21  Radiologist: Dr. Doty [place]: Mechanical soft chopped/ Soft & Bite Sized  Diet Recommendations - Liquids: Nectar MD on 5/20/2021 at 3:13 PM       Reason for Referral: RN dysphagia screen  Patient with cough throughout swallowing evaluation, unclear if related to swallow    Family/Patient Goals:   To get better and go home     ASSESSMENT   DYSPHAGIA ASSESSMENT  Test co swallow;Trace;Transient (x1)  Tracheal Aspiration: None  Cough/Throat Clear Response: No  Strategy(ies) Implemented (Nectar Thick): No straws; Liquids via spoon; Slow rate;Small bites and sips;Multiple swallows  Effectiveness: Yes     PUREE  Oral Phase of Sw further laryngeal penetration or tracheal aspiration with nectar thick liquids with thin by tsp/cup, puree and solids.   Patient's increased bolus hold time was primary deficit though question whether he did this due to persistent coughing throughout the ex

## 2021-05-21 NOTE — PLAN OF CARE
Pt from ER this evening at 1700. Pt alert but difficult to understand due to dysarthria. Right side facial droop noted. Denies pain. Right side flaccid. NSR on monitor, BP stable. RA with sats >95%.  Sips of water with medications, pt noted to have de

## 2021-05-21 NOTE — PROGRESS NOTES
[facility]  Progress Note     Raulito Stahl Patient Status:  Inpatient    1945 MRN [id number]   [facility] 6NE-A Attending [facility] Day # 1 PCP Ivette Sheppard MD     Chief Complaint: Chirstian Porter    S: Patient Lab Results    COVID-19  Lab Results   Component Value Date    COVID19 Not Detected 05/20/2021    COVID19 Not Detected 01/13/2021    COVID19 Not Detected 12/21/2020       Pro-Calcitonin  No results for input(s): PCT in the last 168 hours.     Cardiac  Recen Certification of Need for Inpatient Hospitalization - Initial Certification    Patient will require inpatient services that will reasonably be expected to span two midnight's based on the clinical documentation in H+P.    Based on patients current state of

## 2021-05-21 NOTE — PLAN OF CARE
Pt alert, able to state name and follow commands. Speech is slurred and difficult to understand. Right side flaccid from old stroke. HR 70's NSR with occasional PVC's. AICD has not fired since admission. Potassium replaced per protocol.  Amiodarone gtt infu

## 2021-05-21 NOTE — PLAN OF CARE
0800 Expressive aphasia able to make some needs known . Also using gestures. Speech here to see. Recommendations of video swallow . Coughing while drinking  and taking medication not all medication given. More recommendations following video. See note.  See

## 2021-05-21 NOTE — PAYOR COMM NOTE
--------------  ADMISSION REVIEW     Payor: [employer] ADV PPO  Subscriber #:  [id number]  Authorization Number: [id number]    Admit date: 5/20/21  Admit time:  5:15 PM       Admitting Physician: Dany Brown DO  Attending Physician:  Rosalba Desir use: Never  Review of Systems    Positive for stated complaint: arrythmia  Other systems are as noted in HPI. Constitutional and vital signs reviewed. All other systems reviewed and negative except as noted above.     Physical Exam     ED Triage Vital 5/20/2021  CONCLUSION:  There is marked abnormal gaseous distention of the colon. There is suggestion of a moderate amount of stool in the rectum.    Dictated by (CST): Pranav Ruvalcaba MD on 5/20/2021 at 3:32 PM     Finalized by (CST): Pranav Ruvalcaba Norbert often will notify Dr. Herve Zamudio.   Admission disposition: 5/20/2021  3:23 PM    Disposition and Plan     Clinical Impression:  Ventricular tachycardia ([address]  (primary encounter diagnosis)  Hypokalemia  Colon distention     Disposition:  Admit  5/20/2021 tenderness or deformity. Abdomen: Soft, nontender, nondistended. Positive bowel sounds. No rebound, guarding or organomegaly. Neurologic: No focal neurological deficits. CNII-XII grossly intact. Musculoskeletal: Moves all extremities.   Extremities: No Jeevan Torres RN      Amiodarone HCl (CORDARONE) 150 mg in dextrose 5 % 100 mL bolus     Date Action Dose Route User    5/20/2021 1437 New Bag 150 mg Intravenous Meg Godfrey RN      Amiodarone HCl (CORDARONE) 450 mg in dextrose 5 % 250 mL infusion mEq     Date Action Dose Route User    5/20/2021 2035 New Bag 20 mEq Intravenous Mila PALM, RN      potassium chloride 40 mEq in sodium chloride 0.9% 250 mL IVPB     Date Action Dose Route User    5/20/2021 1529 New Bag 40 mEq Intravenous Sherryle Olp himself at home.     Patient was previously admitted to [facility] with multiple shocks and was found to have hypokalemia.   There is a notation in her chart when the patient visited with our APN approximately 2 weeks ago that his potassium and spirono I do not see any ischemic assessments. Patient apparently has refused these in the past.  Elevation in troponin today is unlikely to be secondary to an acute coronary syndrome I suspect that this is due to his shocks.     3.   History of stroke with right ordered.     Quality:  · DVT Prophylaxis: Heparin  · CODE status: Full  · Jaeger: None  · Central line: None  · If COVID testing is negative, may discontinue isolation:  Yes     Will the patient be referred to TCC on discharge?:  No  Estimated date of discha

## 2021-05-21 NOTE — SLP NOTE
ADULT SWALLOWING EVALUATION    ASSESSMENT    ASSESSMENT/OVERALL IMPRESSION:  Patient seen for swallowing evaluation as he failed RN dysphagia screen due to coughing after drinking water yesterday.   Patient admitted due to \"not feeling well\", c/o chest pa Recommendations/Plan: Undetermined    HISTORY   MEDICAL HISTORY  Reason for Referral: RN dysphagia screen    Problem List  Principal Problem:    Ventricular tachycardia [facility])  Active Problems:    AICD discharge    Ischemic cardiomyopathy    V-tach ([facility] [address] of Swallow: Impaired  Bolus Retrieval: Intact  Bilabial Seal: Intact  Bolus Formation: Impaired  Bolus Propulsion: Impaired  Mastication: Impaired  Retention: Impaired    Pharyngeal Phase of Swallow: Impaired  Laryngeal Elevation Timing: Appears impaired

## 2021-05-21 NOTE — DIETARY MALNUTRITION NOTE
[facility]  NUTRITION ASSESSMENT    Pt meets chronic - moderate malnutrition criteria.     CRITERIA FOR MALNUTRITION DIAGNOSIS:  Criteria for non-severe malnutrition diagnosis: chronic illness related to wt loss 10% in 6 months, energy intake less than This Encounter      Cardiac diet Cardiac, No Straw; Fluid Consistency: Nectar Thick / Mildly Thick Liquids; Texture Consistency: Chopped / Soft & Bite Sized; Is Patient on Accuchecks?  No  Oral Supplements: Magic Cup and Nepro TID    FOOD/NUTRITION RELATED

## 2021-05-21 NOTE — OCCUPATIONAL THERAPY NOTE
OCCUPATIONAL THERAPY QUICK EVALUATION - INPATIENT    Room Number: 4632/2667-F  Evaluation Date: 5/21/2021     Type of Evaluation: Quick Eval  Presenting Problem: Ventricular tachycardia    Physician Order: IP Consult to Occupational Therapy  Reason for The No       Prior Level of Function: Spoke with daughter on the phone. Patient was at 2 FLIP from December to early February. Patient lives with his wife who also had CVA. Caregiver 5 days/week, 3 hrs each day.  The same caregiver spends additional time babita (including washing, rinsing, drying)?: A Lot  -   Toileting, which includes using toilet, bedpan or urinal? : A Lot  -   Putting on and taking off regular upper body clothing?: A Lot  -   Taking care of personal grooming such as brushing teeth?: A Little Specific performance deficits impacting engagement in ADL/IADL  LOW  1 - 3 performance deficits    Client Assessment/Performance Deficits  LOW - No comorbidities nor modifications of tasks    Clinical Decision Making  LOW - Analysis of occupational prof

## 2021-05-21 NOTE — CM/SW NOTE
Pt is a 68 yo male admitted for hypokalemia. Pt lives with his wife who, according to dtr, does not have dementia. They live in a senior apartment with an elevator. They have 6 hours of homemaker services through the [facility].   Pt's is current with Ab

## 2021-05-22 PROCEDURE — 99232 SBSQ HOSP IP/OBS MODERATE 35: CPT | Performed by: HOSPITALIST

## 2021-05-22 RX ORDER — AMIODARONE HYDROCHLORIDE 200 MG/1
400 TABLET ORAL 2 TIMES DAILY WITH MEALS
Status: DISCONTINUED | OUTPATIENT
Start: 2021-05-22 | End: 2021-05-29

## 2021-05-22 RX ORDER — POTASSIUM CHLORIDE 14.9 MG/ML
20 INJECTION INTRAVENOUS ONCE
Status: COMPLETED | OUTPATIENT
Start: 2021-05-22 | End: 2021-05-22

## 2021-05-22 NOTE — PROGRESS NOTES
[facility]  Progress Note    Quique Mckinnon Patient Status:  Inpatient    1945 MRN [id number]   [facility]NE-A [facility] Jason Lux MD       Assessment and Plan:  Patient Active Pr (37.2 °C)      Intake/Output:    Intake/Output Summary (Last 24 hours) at 5/22/2021 0745  Last data filed at 5/22/2021 0600  Gross per 24 hour   Intake 1922 ml   Output —   Net 1922 ml       Wt Readings from Last 3 Encounters:  05/22/21 : 125 lb (56.7 kg) 10 mg, 10 mg, Intravenous, Q8H PRN  aspirin EC tab 81 mg, 81 mg, Oral, Daily  atorvastatin (LIPITOR) tab 40 mg, 40 mg, Oral, Nightly  carvedilol (COREG) tab 12.5 mg, 12.5 mg, Oral, BID with meals  Mexiletine HCl (MEXITIL) cap 150 mg, 150 mg, Oral, 2 times

## 2021-05-22 NOTE — PROGRESS NOTES
[facility]  Progress Note     Esdras Kulkarni Patient Status:  Inpatient    1945 MRN [id number]   [facility] 6NE-A Attending [address] [address] [place] Day # 2 PCP Brian Ferrera MD     Chief Complaint: Lacy Piedra    S: Patient --   --   --     < > = values in this interval not displayed. Estimated Creatinine Clearance: 70 mL/min (based on SCr of 0.72 mg/dL).     Recent Labs   Lab 05/20/21  1437   PTP 13.3   INR 0.98            COVID-19 Lab Results    COVID-19  Lab Results No  Estimated date of discharge: TBD  Discharge is dependent on: Clinical improvement  At this point Mr. Brigid Ji is expected to be discharge to: TBD    Plan of care discussed with patient at bedside.     Roverto Michael DO          **Certification

## 2021-05-22 NOTE — PROGRESS NOTES
Assumed care of the patient at 200. Patient is alert to self and really unable to determine real orientation d/t aphasia. RUE/RLE paralysis/numbness/edema d/t stroke. A paced on tele. Lung sounds diminished. Patient denies pain.  Patient is resting comfort

## 2021-05-22 NOTE — PLAN OF CARE
Received pt at 0730. Pt alert and oriented to self but has baseline R sided paralysis/numbness and expressive aphasia but nods appropriately and is able to express needs through pointing and nodding/shaking.  Pt on RA, lungs diminished with a non-productive

## 2021-05-22 NOTE — PLAN OF CARE
Unable to start second IV, concentrations of ordered IV potassium not compatible with amio. IV potassium changed to PO. Tablets dissolved in nectar thick water. Pt took greater than half the dose, then refused remainder. He refused second dose.  No ectopy n

## 2021-05-22 NOTE — PROGRESS NOTES
[employer] device interrogated, Called [employer], spoke to [place] , who will page rep to call back Velma HARDEN, will tube report to floor to place in chart

## 2021-05-22 NOTE — SLP NOTE
SPEECH DAILY NOTE - INPATIENT    ASSESSMENT & PLAN   ASSESSMENT  Pt seen for VFSS yesterday with recommendation of mech soft chopped/soft and bite sized with honey thick liquid.   RN paged to report pt coughing with nectar thick liquids this AM.      Pt giv accuracy across 1-2 sessions.    In progress   Goal #4     Goal #5     Goal #6     Goal #7     Goal #8       FOLLOW UP  Follow Up Needed (Documentation Required): Yes  SLP Follow-up Date: 05/23/21  Number of Visits to Meet Established Goals: 2    Session: 1

## 2021-05-22 NOTE — PROGRESS NOTES
[facility]  Progress Note    Julio Pena Patient Status:  Inpatient    1945 MRN [id number]   [facility] 6NE-A Attending [address] [place] [address] Day # 1 PCP Raymond Estrada MD     Assessment:  1.  VT- frequent episodes req CA 8.8 05/21/2021        Lab Results   Component Value Date    TROP 0.885 () 05/20/2021    TROP 2.150 () 01/13/2021    TROP 2.200 () 01/13/2021        Medications:    • Potassium Chloride ER  40 mEq Oral Q4H   • Heparin Sodium (Porcine)  5,000 Units

## 2021-05-23 PROCEDURE — 4B02XTZ MEASUREMENT OF CARDIAC DEFIBRILLATOR, EXTERNAL APPROACH: ICD-10-PCS | Performed by: INTERNAL MEDICINE

## 2021-05-23 PROCEDURE — 99232 SBSQ HOSP IP/OBS MODERATE 35: CPT | Performed by: HOSPITALIST

## 2021-05-23 RX ORDER — POTASSIUM CHLORIDE 14.9 MG/ML
20 INJECTION INTRAVENOUS ONCE
Status: COMPLETED | OUTPATIENT
Start: 2021-05-23 | End: 2021-05-23

## 2021-05-23 NOTE — PLAN OF CARE
Pt alert and oriented x 3 with expressive aphasia with slurred speech and right sided paralysis, which is his baseline. Incontinent of bowel and bladder. incontince  Care rendered.    Rpt K 3.9  Fair appetite for breakfast and poor for lunch mostly just kari function  Description: INTERVENTIONS:  - Assess bowel function  - Maintain adequate hydration with IV or PO as ordered and tolerated  - Evaluate effectiveness of GI medications  - Encourage mobilization and activity  - Obtain nutritional consult as needed

## 2021-05-23 NOTE — PLAN OF CARE
0330    Rpt. K+ 3.4 - replaced per electrolyte protocol. Cont. Monitor per tele/labs/v/s. Fall/safety precautions instructed , placed call light w/in reach.              Alert and o x 3 , chronic expressive aphasia , slurred speech  And right sided paralysi function  Description: INTERVENTIONS:  - Assess bowel function  - Maintain adequate hydration with IV or PO as ordered and tolerated  - Evaluate effectiveness of GI medications  - Encourage mobilization and activity  - Obtain nutritional consult as needed

## 2021-05-23 NOTE — PLAN OF CARE
Received patient at 1. Patient Alert to self. difficult to understand due to dysphagia. R sided weakness/flaccid noted from hx of stroke. Tele Rhythm Atrial paced. O2 Saturation 96% On RA. Incontinent of B&B.  Had bowel movement this evening, loose st

## 2021-05-23 NOTE — SLP NOTE
SPEECH DAILY NOTE - INPATIENT    ASSESSMENT & PLAN   ASSESSMENT  Pt seen this morning for meal monitor to ensure toleration of recommended diet and education re: aspiration precautions/compensatory strategies. Nurse approved this session.  No family was pre Recommendations  Discharge Recommendations/Plan: Undetermined    Treatment Plan  Treatment Plan/Recommendations: Dysphagia therapy(repeat VFSS in 2-4 weeks)    Interdisciplinary Communication: Discussed with RN     GOALS  Goal #1 The patient will tolerate

## 2021-05-23 NOTE — PROGRESS NOTES
[facility]  Progress Note     Raulito Stahl Patient Status:  Inpatient    1945 MRN [id number]   [facility] 6NE-A Attending [facility] Day # 3 PCP Ivette Sheppard MD     Chief Complaint: Christian Porter    S: Patient K 2.1*   < > 3.4*   < > 3.3*   < > 2.7* 3.1* 3.4*     --  112  --  112  --   --   --   --    CO2 20.0*  --  19.0*  --  21.0  --   --   --   --    ALKPHO 69  --   --   --   --   --   --   --   --    AST 32  --   --   --   --   --   --   --   --    A stroke-patient passed swallow eval and recommended to be on mechanical soft chopped diet with nectar thick liquids. Plan of care: Video swallow ordered.     Quality:  · DVT Prophylaxis: Heparin  · CODE status: Full  · Jaeger: None  · Central line: None  ·

## 2021-05-23 NOTE — PROGRESS NOTES
[facility]  Cardiology Progress Note    Subjective:  No obvious recurrent ventricular arrhythmias    Objective:  /68 (BP Location: Left arm)   Pulse 70   Temp 97.8 °F (36.6 °C) (Oral)   Resp 18   Wt 125 lb (56.7 kg)   SpO2 100%   BMI 17.94 kg/m² Amiodarone 400mg PO BID - eventually transition to Amiodarone 200mg PO daily.    - Continue present Mexelitine    Renetta Minor, SWAPNIL  5/23/2021  2:19 PM    EP to re-evaluate in am.   Hypokalemia induced VT  Continue potassium replacement, amiodarone and me

## 2021-05-24 PROBLEM — Z51.5 PALLIATIVE CARE ENCOUNTER: Status: ACTIVE | Noted: 2021-05-24

## 2021-05-24 PROBLEM — Z71.89 COUNSELING REGARDING ADVANCE CARE PLANNING AND GOALS OF CARE: Status: ACTIVE | Noted: 2021-05-24

## 2021-05-24 PROCEDURE — 99223 1ST HOSP IP/OBS HIGH 75: CPT | Performed by: NURSE PRACTITIONER

## 2021-05-24 PROCEDURE — 99232 SBSQ HOSP IP/OBS MODERATE 35: CPT | Performed by: HOSPITALIST

## 2021-05-24 RX ORDER — MAGNESIUM OXIDE 400 MG (241.3 MG MAGNESIUM) TABLET
400 TABLET ONCE
Status: COMPLETED | OUTPATIENT
Start: 2021-05-24 | End: 2021-05-24

## 2021-05-24 RX ORDER — SPIRONOLACTONE 25 MG/1
25 TABLET ORAL DAILY
Status: DISCONTINUED | OUTPATIENT
Start: 2021-05-25 | End: 2021-05-29

## 2021-05-24 RX ORDER — POTASSIUM CHLORIDE 14.9 MG/ML
20 INJECTION INTRAVENOUS ONCE
Status: COMPLETED | OUTPATIENT
Start: 2021-05-24 | End: 2021-05-24

## 2021-05-24 RX ORDER — POTASSIUM CHLORIDE 29.8 MG/ML
40 INJECTION INTRAVENOUS ONCE
Status: COMPLETED | OUTPATIENT
Start: 2021-05-24 | End: 2021-05-24

## 2021-05-24 RX ORDER — POTASSIUM CHLORIDE 20 MEQ/1
40 TABLET, EXTENDED RELEASE ORAL ONCE
Status: COMPLETED | OUTPATIENT
Start: 2021-05-24 | End: 2021-05-24

## 2021-05-24 RX ORDER — POTASSIUM CHLORIDE 14.9 MG/ML
20 INJECTION INTRAVENOUS ONCE
Status: COMPLETED | OUTPATIENT
Start: 2021-05-25 | End: 2021-05-25

## 2021-05-24 NOTE — PLAN OF CARE
Pt received at 0730 resting in bed. A&Ox3, expressive aphasia but follows commands and answers yes/no questions appropriately. Right sided paralysis. On room air saturating >90%. A paced on the monitor. K 2.5 this morning, replaced per hospitalist order.  P

## 2021-05-24 NOTE — PROGRESS NOTES
[facility]  Cardiology Progress Note    Rosita Perez Patient Status:  Inpatient    1945 MRN [id number]   [facility] 8NE-A Attending Liz[facility] Day # 4 PCP Zaki Hernandez MD     Subjective:  Nods head no to p cad/hxCABG- not c/w acs. Likely spurious elevation with ICD discharge.  Continue medical therapy  · ICM w/ EF ~15-20%   · Hx left hemispheric CVA, residual right sided hemiparesis and expressive aphasia   · HTN  · HL-statin  · Recurrent severe hypokalemia

## 2021-05-24 NOTE — CM/SW NOTE
Per Aidin review, [facility] is not able to accept pt. SW spoke to intake at [facility], they stated that they don't have that pt in their system, but pt may have caregiving services through [employer]. Requested a call back from the caregiver dept to confirm.

## 2021-05-24 NOTE — SLP NOTE
SPEECH DAILY NOTE - INPATIENT    ASSESSMENT & PLAN   ASSESSMENT  Pt seen for dysphagia tx to assess tolerance with recommended diet, ensure proper utilization of aspiration precautions and provide pt/family education. Patient alert and up in bed.   He is s strategies independently over 1-2 session(s). In Progress   Goal #3 Pt will tolerate trials of nectar liquids with 100% accuracy across 1-2 sessions.  Met      FOLLOW UP  Follow Up Needed (Documentation Required): Yes  SLP Follow-up Date: 05/25/21  Number o

## 2021-05-24 NOTE — PROGRESS NOTES
[facility]  Progress Note     Rosita Perez Patient Status:  Inpatient    1945 MRN [id number]   [facility] 6NE-A Attending Lizzy [facility] Day # 4 PCP Zaki Hernanedz MD     Chief Complaint: Ya Solis    S: Patient 2.1*   < > 3.4*   < > 3.3*   < > 3.4* 3.9 2.5*     --  112  --  112  --   --   --   --    CO2 20.0*  --  19.0*  --  21.0  --   --   --   --    ALKPHO 69  --   --   --   --   --   --   --   --    AST 32  --   --   --   --   --   --   --   --    ALT 13 spironolactone. 5. Elevated troponin-secondary to V. Tach. 6. Diarrhea-we will hold off on laxatives and Colace. 7. History of stroke-patient passed swallow eval and recommended to be on mechanical soft chopped diet with nectar thick liquids.     Plan of

## 2021-05-24 NOTE — CONSULTS
[address] Patient Status:  Inpatient    1945 MRN [facility] [id number]   [facility]NE-A [facility] Michael Barahona MD     Date of Consult:  d/t aphasia  Occupational History: deferred  M[employer]onism affiliation:  [employer]    Functional Status: Prior to admission, pt required total assistance with all ADLs. PT, OT recommendations:  24 hr care    Medications:      Allergies:  No Known Allergie care for. Dysphagia: +. SLP evaluation and recommendations:  Mechanical soft chopped solids, small bites with nectar thick liquids    Nausea: denied. Nutritional status/Weight changes: Current Body mass index is 17.94 kg/m². Sylvester Yusuf  Per chart, pt has lost Normal or  Reduced Full   70 Reduced Some disease  Can't perform job Full Normal or   Reduced Full   60 Reduced Significant disease  Can't perform hobby Occasional  Assist Normal or   Reduced Full or confused   50 Mainly sit/lie Extensive Disease  Can't do feeding tube. It was unclear to me how much of this he was grasping, but he seemed to be less engaged in conversation when focus shifted to his wishes.     I explored whether or not he had Advance Directives that outlined who is decision-makers are, and [place] Patient Active Problem List:     Palpitations     AICD discharge     S/P CABG (coronary artery bypass graft)     Ischemic cardiomyopathy     V-tach (HCC)     CAD in native artery     Hyponatremia     Hyperglycemia     Elevated troponin     Chest pain o her of assessment, neuropsych input, and concerns re PO access, ?consideration for TF vs comfort. She told me that their daughter, \" 'TJ' can help with that. \" I will call Florencia Gonzalez later this afternoon to review this information and explore next steps in POC. hospice if no feeding tube. I informed him I would be circling back w family and clinical team to inform and support with next steps. I spoke with [facility] who stated she anticipated he would not want a feeding tube.  She is unsure if they should consider h

## 2021-05-24 NOTE — PHYSICAL THERAPY NOTE
PHYSICAL THERAPY TREATMENT NOTE - INPATIENT    Room Number: 9809/6980-X     Session: 1   Number of Visits to Meet Established Goals: 5    Presenting Problem: Ventricular tachycardia     Reason for Therapy: Mobility Dysfunction and Discharge Planning     H BEARING RESTRICTION  Weight Bearing Restriction: None                PAIN ASSESSMENT   Ratin          BALANCE                                                                                                                     Static Sitting: Not tested bed;Needs met;Call light within reach;RN aware of session/findings; All patient questions and concerns addressed; Alarm set    ASSESSMENT   Pt remains limited in mobility due to refusal. Encouragement and education was provided for EOB sitting, but pt refuse

## 2021-05-24 NOTE — CONSULTS
[facility]  Report of Neuropsycholgy Consultation    Luz Elena Sunshine Patient Status:  Inpatient    1945 MRN [id number]   [facility] 8NE-A Att[facility] Day [facility] PCP Portia Angel MD (PACERONE) tab 400 mg, 400 mg, Oral, BID with meals  •  Heparin Sodium (Porcine) 5000 UNIT/ML injection 5,000 Units, 5,000 Units, Subcutaneous, Q8H [address]  •  acetaminophen (TYLENOL) tab 650 mg, 650 mg, Oral, Q6H PRN  •  docusate sodium (COLACE) cap 100 mg, 1 his age. He actually struggled to articulate the words and had to write it down with his fingers. He was able to repeat for unrelated words with multiple prompts. There is some mild paraphasic errors. There is a quality of aphonia as well.   Note that i dyspraxia and or confusion with sequencing.   He consistently did sequence a single step command indicating the receptive ability to respond to a single prompt but on two-step commands he was slightly confused but was successful with the second administrati

## 2021-05-24 NOTE — PLAN OF CARE
Received patient at [facility]. Patient A/O x 3. Tele Rhythm Atrial paced. O2 Saturation 96% On RA. Pt has hx of stroke in '07. R sided paralysis noted. R arm flaccid, swollen as well. Elevated with pillow. No C/O chest pain or SOB. Incontinent of B&B.  Br

## 2021-05-25 PROCEDURE — 99232 SBSQ HOSP IP/OBS MODERATE 35: CPT | Performed by: HOSPITALIST

## 2021-05-25 RX ORDER — MAGNESIUM SULFATE HEPTAHYDRATE 40 MG/ML
2 INJECTION, SOLUTION INTRAVENOUS ONCE
Status: COMPLETED | OUTPATIENT
Start: 2021-05-25 | End: 2021-05-25

## 2021-05-25 RX ORDER — POTASSIUM CHLORIDE 14.9 MG/ML
20 INJECTION INTRAVENOUS ONCE
Status: COMPLETED | OUTPATIENT
Start: 2021-05-25 | End: 2021-05-25

## 2021-05-25 NOTE — PROGRESS NOTES
[facility]  Progress Note     Ludin Guerrero Patient Status:  Inpatient    1945 MRN [id number]   [facility] 6NE-A Attending Vid[facility] Day # 5 PCP Amena Sifuentes MD     Chief Complaint: Achilles Ayannae    S: Patient --  137  --  142  --   --   --   --    K 2.1*   < > 3.4*   < > 3.3*   < > 2.5* 3.2* 3.2*     --  112  --  112  --   --   --   --    CO2 20.0*  --  19.0*  --  21.0  --   --   --   --    ALKPHO 69  --   --   --   --   --   --   --   --    AST 32  -- and Colace. 7. History of stroke-patient passed swallow eval and recommended to be on mechanical soft chopped diet with nectar thick liquids. Plan of care: With patient's decreased appetite and not taking in enough food to clearly potassium rich foods.

## 2021-05-25 NOTE — PLAN OF CARE
Received pt at 0730. A&o x4. Expressive aphasia and right-sided paralysis. R arm swollen. Atrial Paced on tele. VSS. Denies chest pain and SOB. WNL on room air. Last BM 5/25 with good urine output. Electrolytes replaced as needed.  Q2 turns completed to pre bowel function  - Maintain adequate hydration with IV or PO as ordered and tolerated  - Evaluate effectiveness of GI medications  - Encourage mobilization and activity  - Obtain nutritional consult as needed  - Establish a toileting routine/schedule  - Con

## 2021-05-25 NOTE — CM/SW NOTE
Care Progression Note:  Active Acute Medical Issue:   Ventricular tachycardia ([facility] [address] d/t hypokalemia  PC team following, per MD note family to decide on feeding tube vs hospice care.      Other Contributing Medical Factors/Dx.:   HTN, HL, CVA, ICM sp ICD jem

## 2021-05-25 NOTE — PLAN OF CARE
Pt. Alert and oriented , with expressive aphasia and right sided paralysis , right arm swollen , elevated on the pillow. Rpt. K+ 3.2 , electrolyte protocol as ordered. Potassium mikie infusing at this time. Cont. Monitor per tele/labs/v/s.  Med control medications as ordered  - Initiate emergency measures for life threatening arrhythmias  - Monitor electrolytes and administer replacement therapy as ordered  Outcome: Progressing     Problem: GASTROINTESTINAL - ADULT  Goal: Maintains or returns to algorithm/standards of care as needed  Outcome: Progressing

## 2021-05-25 NOTE — PROGRESS NOTES
[facility]  Cardiology Progress Note    Subjective:  No chest pain or shortness of breath.     Objective:  /66 (BP Location: Right arm)   Pulse 70   Temp 98.1 °F (36.7 °C) (Oral)   Resp 18   Wt 125 lb (56.7 kg)   SpO2 99%   BMI 17.94 kg/m²     Te Berkley Gonzalez, [address] [facility]  Cardiac Electrophysiolgy  [phone]

## 2021-05-26 ENCOUNTER — TELEPHONE (OUTPATIENT)
Dept: PALLIATIVE CARE | Facility: CLINIC | Age: 76
End: 2021-05-26

## 2021-05-26 PROCEDURE — 99233 SBSQ HOSP IP/OBS HIGH 50: CPT | Performed by: HOSPITALIST

## 2021-05-26 RX ORDER — POTASSIUM CHLORIDE 14.9 MG/ML
20 INJECTION INTRAVENOUS ONCE
Status: COMPLETED | OUTPATIENT
Start: 2021-05-26 | End: 2021-05-26

## 2021-05-26 RX ORDER — LOPERAMIDE HYDROCHLORIDE 2 MG/1
2 CAPSULE ORAL 4 TIMES DAILY PRN
Status: DISCONTINUED | OUTPATIENT
Start: 2021-05-26 | End: 2021-05-29

## 2021-05-26 RX ORDER — AMIODARONE HYDROCHLORIDE 200 MG/1
TABLET ORAL
Status: DISPENSED
Start: 2021-05-26 | End: 2021-05-27

## 2021-05-26 RX ORDER — POTASSIUM CHLORIDE 20 MEQ/1
20 TABLET, EXTENDED RELEASE ORAL DAILY
Status: DISCONTINUED | OUTPATIENT
Start: 2021-05-26 | End: 2021-05-29

## 2021-05-26 RX ORDER — POTASSIUM CHLORIDE 14.9 MG/ML
20 INJECTION INTRAVENOUS ONCE
Status: COMPLETED | OUTPATIENT
Start: 2021-05-26 | End: 2021-05-27

## 2021-05-26 NOTE — SLP NOTE
SPEECH DAILY NOTE - INPATIENT    ASSESSMENT & PLAN   ASSESSMENT  Pt seen for dysphagia tx to assess tolerance with recommended diet, ensure proper utilization of aspiration precautions and provide pt/family education.   Patient alert and up in bed, he is sp contact John Peck Mathieu 86 CCC-SLP  Pager [phone]

## 2021-05-26 NOTE — PROGRESS NOTES
[facility]  Progress Note     Yomi Matos Patient Status:  Inpatient    1945 MRN [id number]   [facility] 6NE-A [facility] PCP Flex Francisco MD     Chief Complaint: Lequita Knee    S: Loose s < > 3.3*   < > 3.5 4.0 3.4*     --  112  --  112  --   --   --   --    CO2 20.0*  --  19.0*  --  21.0  --   --   --   --    ALKPHO 69  --   --   --   --   --   --   --   --    AST 32  --   --   --   --   --   --   --   --    ALT 13*  --   --   --   - to Chaim Salcido. 6. Diarrhea- Hold  laxatives and Colace. Start imodium   7. History of stroke-patient passed swallow eval and recommended to be on mechanical soft chopped diet with nectar thick liquids  8.  Severe malnutrition- family considering hospice vs PE

## 2021-05-26 NOTE — PROGRESS NOTES
[facility]  Cardiology Progress Note    Subjective:  No chest pain or shortness of breath.     Objective:  /76 (BP Location: Left arm)   Pulse 69   Temp 98.2 °F (36.8 °C) (Oral)   Resp 18   Wt 125 lb (56.7 kg)   SpO2 97%   BMI 17.94 kg/m²     Tel therapies and thereby draining the battery. Continue medical therapy for now. We will plan for generator change in the near future.     Dilan Meehan MD  [facility]  Cardiac Electrophysiolgy  [phone]

## 2021-05-26 NOTE — PLAN OF CARE
Received pt at 0730. A&o x4. Expressive aphasia and right-sided paralysis. R arm swollen. Atrial Paced on tele. VSS. Denies chest pain and SOB. WNL on room air. Last BM 5/25 with good urine output. Electrolytes replaced as needed.  Q2 turns completed to pre Assess bowel function  - Maintain adequate hydration with IV or PO as ordered and tolerated  - Evaluate effectiveness of GI medications  - Encourage mobilization and activity  - Obtain nutritional consult as needed  - Establish a toileting routine/schedule

## 2021-05-26 NOTE — PLAN OF CARE
Alert and o x 2-3 . On mod. high back rest , breathing pattern easy and non labored. Denies any pain or discomfort. Pt. With old stroke , with expressive aphasia and right sided paralysis , turned and repositioned Q 2 hrs.  Skin and incontinency c function  - Maintain adequate hydration with IV or PO as ordered and tolerated  - Evaluate effectiveness of GI medications  - Encourage mobilization and activity  - Obtain nutritional consult as needed  - Establish a toileting routine/schedule  - Consider

## 2021-05-26 NOTE — DIETARY MALNUTRITION NOTE
[facility]  NUTRITION ASSESSMENT    Pt meets chronic - moderate malnutrition criteria.     CRITERIA FOR MALNUTRITION DIAGNOSIS:  Criteria for non-severe malnutrition diagnosis: chronic illness related to wt loss 10% in 6 months, energy intake less than 75.5 kg    WEIGHT HISTORY:  Wt Readings from Last 10 Encounters:  05/22/21 : 56.7 kg (125 lb)  01/19/21 : 67.6 kg (149 lb)  12/29/20 : 63.5 kg (139 lb 15.9 oz)  10/05/20 : 81.6 kg (180 lb)    NUTRITION:  Diet: Orders Placed This Encounter      Cardiac diet

## 2021-05-26 NOTE — PALLIATIVE CARE NOTE
Left message for daughter LETA to review Plan of Care at 0936am. Will update with details pending further d/w family.     Reza Ramos  Palliative Care

## 2021-05-27 PROCEDURE — 99233 SBSQ HOSP IP/OBS HIGH 50: CPT | Performed by: HOSPITALIST

## 2021-05-27 PROCEDURE — 99232 SBSQ HOSP IP/OBS MODERATE 35: CPT | Performed by: NURSE PRACTITIONER

## 2021-05-27 RX ORDER — MAGNESIUM SULFATE HEPTAHYDRATE 40 MG/ML
2 INJECTION, SOLUTION INTRAVENOUS ONCE
Status: COMPLETED | OUTPATIENT
Start: 2021-05-27 | End: 2021-05-27

## 2021-05-27 NOTE — CM/SW NOTE
SW received order for \"hospice consult with [facility].\" SW spoke with Marek Toledo at [facility]: [phone]. Marek Toledo will place the referral in Aidin and follow up with family for meeting.      &  to remain available and supportive f

## 2021-05-27 NOTE — PROGRESS NOTES
[facility]  Cardiology Progress Note    Subjective:  No chest pain or shortness of breath.     Objective:  /53 (BP Location: Radial)   Pulse 70   Temp 97.9 °F (36.6 °C) (Oral)   Resp 18   Wt 125 lb (56.7 kg)   SpO2 100%   BMI 17.94 kg/m²     Tele

## 2021-05-27 NOTE — PROGRESS NOTES
[facility]  Progress Note     Porsha [place] Patient Status:  Inpatient    1945 MRN [id number]   [facility] 6NE-A [facility] Day # 7 PCP Thelma Chiang MD     Chief Complaint: Qian Morales    S: 2 loose 3. 4* 3.6 4.4     --  112  --  112  --   --   --   --    CO2 20.0*  --  19.0*  --  21.0  --   --   --   --    ALKPHO 69  --   --   --   --   --   --   --   --    AST 32  --   --   --   --   --   --   --   --    ALT 13*  --   --   --   --   --   --   - soft chopped diet with nectar thick liquids  8. Moderate  malnutrition- family considering hospice vs PEG.  Patient not decisional      Delays on family with decision moving forward  K+ stable  DC planning pending decision on nezt steps  Palliative input mu

## 2021-05-27 NOTE — PROGRESS NOTES
[address] [facility] 149 Follow Up    Julio Pena Patient Status:  Inpatient    1945 MRN [id number]   [facility] 8NE-A Attending Carlos Be MD   [facility] Day # 7 PCP Raymond Estrada MD     Date of visit:  2021  Day 7 of disease  Normal w/effort Full Normal or  Reduced Full   70 Reduced Some disease  Can't perform job Full Normal or   Reduced Full   60 Reduced Significant disease  Can't perform hobby Occasional  Assist Normal or   Reduced Full or confused   50 Mainly sit/l support this process. Hospice is service suited to support comfort care and could be provided at home. TJ is receptive to hearing about what hospice would look like for [facility]. No agency of preference.  Will refer to SW for consult to hospice and remain a LETA.    Disposition:  Ongoing GOC discussions pending course; await hospice eval.      A total of 25 minutes were spent on this visit, which included all of the following:  Direct face-to-face contact, history taking, physical examination, and > 50% was spe

## 2021-05-27 NOTE — PLAN OF CARE
Alert and o x 2-3 with expressive aphasia and right sided weakness from old stroke. On Ra. Resp. Pattern easy and non labored. Tele shows A paced on the monitor. Denies any pain or discomfort. K+ mikie replacement on going .          Incontinency c INTERVENTIONS:  - Assess bowel function  - Maintain adequate hydration with IV or PO as ordered and tolerated  - Evaluate effectiveness of GI medications  - Encourage mobilization and activity  - Obtain nutritional consult as needed  - Establish a toiletin

## 2021-05-27 NOTE — PHYSICAL THERAPY NOTE
PHYSICAL THERAPY TREATMENT NOTE - INPATIENT    Room Number: 2146/4753-V     Session: 2   Number of Visits to Meet Established Goals: 5    History related to current admission:  Patient was admitted from home on 5/20 after pt felt heart palpitations.  Multi PAIN ASSESSMENT   Ratin          BALANCE                                                                                                                     Static Sitting: Not tested  Dynamic Sitting: Not tested           Static Standing: Not test bed;Needs met;Call light within reach;RN aware of session/findings; All patient questions and concerns addressed    ASSESSMENT   Patient continues to demonstrate deficits in strength, balance, endurance, mobility and functional independence, and will benefi

## 2021-05-27 NOTE — HOSPICE RN NOTE
Consult received. Call placed to [facility] who answered my call but deferred to daughter. VM left for [place], \"TJ\" with number to call back to set meeting for hospice. ERWIN Sethi updated.

## 2021-05-28 PROCEDURE — 99232 SBSQ HOSP IP/OBS MODERATE 35: CPT | Performed by: HOSPITALIST

## 2021-05-28 RX ORDER — AMIODARONE HYDROCHLORIDE 400 MG/1
400 TABLET ORAL 2 TIMES DAILY WITH MEALS
Qty: 28 TABLET | Refills: 0 | Status: SHIPPED | OUTPATIENT
Start: 2021-05-28 | End: 2021-06-11

## 2021-05-28 RX ORDER — AMIODARONE HYDROCHLORIDE 200 MG/1
200 TABLET ORAL DAILY
Qty: 30 TABLET | Refills: 3 | Status: ON HOLD | OUTPATIENT
Start: 2021-06-12 | End: 2021-08-25

## 2021-05-28 RX ORDER — SPIRONOLACTONE 25 MG/1
25 TABLET ORAL DAILY
Qty: 30 TABLET | Refills: 3 | Status: ON HOLD | OUTPATIENT
Start: 2021-05-29 | End: 2021-08-20

## 2021-05-28 NOTE — PROGRESS NOTES
[facility] liaison spoke with Daughter Lucia Guzman as requested by wife and scheduled hospice phone meeting at  today to discuss hospice services as requested.

## 2021-05-28 NOTE — HOSPICE RN NOTE
Phone call to daughter Claudean December to provide information on hospice philosophy and benefit. She shared background of her father's illness and asked many questions during extended phone meeting.   Full code vs DNR was discussed but she and her mother were not r

## 2021-05-28 NOTE — PLAN OF CARE
Received patient on bed, awake, alert watching TV. Denied pain. Denied SOB. With right sided paralysis from old stroke. Incontinent of both. Discussed POC. Due meds given. Aspiration precaution maintained. Turned and repositioned, incontinent care  done.  Mohamud Cunningham function  Description: INTERVENTIONS:  - Assess bowel function  - Maintain adequate hydration with IV or PO as ordered and tolerated  - Evaluate effectiveness of GI medications  - Encourage mobilization and activity  - Obtain nutritional consult as needed

## 2021-05-28 NOTE — PROGRESS NOTES
[facility]  Progress Note     Oli Vicente Patient Status:  Inpatient    1945 MRN [id number]   [facility] 6NE-A [facility] P[facility] Rambo Diaz MD     Chief Complaint: Tierney Junior    S: Hospice Pro-Calcitonin  No results for input(s): PCT in the last 168 hours. Cardiac  No results for input(s): TROP, PBNP in the last 168 hours. Creatinine Kinase  No results for input(s): CK in the last 168 hours.   Inflammatory Markers  No results for input(

## 2021-05-28 NOTE — PLAN OF CARE
Chart reviewed. Patient was kept last night after palliative care consult for a hospice meeting today. Stable from a cardiology standpoint. We will sign off. Please call with any questions or concerns. Thank you.

## 2021-05-29 VITALS
WEIGHT: 125 LBS | BODY MASS INDEX: 18 KG/M2 | OXYGEN SATURATION: 99 % | RESPIRATION RATE: 16 BRPM | HEART RATE: 73 BPM | SYSTOLIC BLOOD PRESSURE: 100 MMHG | DIASTOLIC BLOOD PRESSURE: 59 MMHG | TEMPERATURE: 98 F

## 2021-05-29 PROCEDURE — 99239 HOSP IP/OBS DSCHRG MGMT >30: CPT | Performed by: HOSPITALIST

## 2021-05-29 NOTE — PLAN OF CARE
Patient laying in bed, expressive aphasia noted, with slurred speech and incomprehensible at times. Patient alert to self and place only, forgetful at times;  Apaced on cardiac monitor; lungs diminished bilaterally, RA, no cough noted; no edema noted; site

## 2021-05-29 NOTE — HOSPICE RN NOTE
Pt will discharge home today at 2:30 pm via [facility]. Meds and DME will be delivered prior to pt arriving home. Called and updated pt daughter Rolan Antonia and Luiz Pacheco RN. PCS form done in Epic.

## 2021-05-29 NOTE — PLAN OF CARE
Patient tele D/C, IV discontinued with catheter intact. Patient denies CP, SOB, dizziness, or palpitations. Patient denies calf tenderness. Patient discharge AVS provided. Patient escorted via EMS back home.      Per [facility], ok to remove periph measures for life threatening arrhythmias  - Monitor electrolytes and administer replacement therapy as ordered  5/29/2021 1546 by Teri Melo RN  Outcome: Adequate for Discharge  5/29/2021 1027 by Teri Melo RN  Outcome: Progressing     Problem: for Discharge  5/29/2021 1027 by Kaylin Chamberlain RN  Outcome: Progressing     Problem: SKIN/TISSUE INTEGRITY - ADULT  Goal: Skin integrity remains intact  Description: INTERVENTIONS  - Assess and document risk factors for pressure ulcer development  - Asse

## 2021-05-29 NOTE — PLAN OF CARE
Assumed care of pt @2330. Pt awake, lying in bed axox3. Denies pain. Denies sob. A paced on tele. Scds on. Lungs clear/diminished. O2 sat on RA >92%. Right sided paralysis from old stroke. Incontinent of bowel and bladder. Aspiration precautions.  Turned Q2 appropriate  Outcome: Progressing

## 2021-05-29 NOTE — PROGRESS NOTES
[facility]  Progress Note     Yvescatalinagage Matos Patient Status:  Inpatient    1945 MRN [id number]   [facility] Flex Francisco MD     Chief Complaint: Lequita Knee    S: Hospice hours.  Imaging: Imaging data reviewed in Epic.   Medications:   • Potassium Chloride ER  20 mEq Oral Daily   • spironolactone  25 mg Oral Daily   • Amiodarone HCl  400 mg Oral BID with meals   • Heparin Sodium (Porcine)  5,000 Units Subcutaneous [facility]

## 2021-05-30 NOTE — PAYOR COMM NOTE
Discharge Notification    Patient Name: Corine Edwards: Chintaneleno ROSA PPO  Subscriber #: [id number]  Authorization Number: [id number]  Admit Date/Time: 5/20/2021 2:29 PM  Discharge Date/Time: 5/29/2021 3:00 PM

## 2021-07-20 NOTE — PHYSICAL THERAPY NOTE
Discharge instructions with pt. Body aches at 3-4. Still with non productive cough. Pneumonia teaching with pt. Explained rx's. Explained importance of having a family doctor for regular medical care. Explained how to get a family doctor. [place] clinic info sheet given.  [facility] clinic list given. [facility] referral line given. JAYESH Dutton , phone number listed in case pt needs any further assistance.      Sola Israel RN  07/20/21 4711 PHYSICAL THERAPY EVALUATION - INPATIENT     Room Number: 6457/5875-O  Evaluation Date: 5/21/2021  Type of Evaluation: Initial  Physician Order: PT Eval and Treat    Presenting Problem: Ventricular tachycardia  Reason for Therapy: Mobility Dysfunction board, motorized w/c)       Prior Level of [place]:OT Spoke with daughter on the phone after our session. Patient was at 2 SARs from December to early February. Patient lives with his wife who also had CVA. Caregiver 5 days/week, 3 hrs each day.  The evaluation    Lower extremity ROM - L le wfl  R le - significant tone noted. Hip flexion to ~40 degrees, hip abd/add 40 degrees, knee flexion 30 degrees, dorsiflexion 20 degrees short of neutral.  Once sitting eob, r le flexed to 80 degrees while on eob. Once sitting eob, pt gained sitting balance - cga while on eob. Pt tolerated sitting on eob for 6 minutes, before initiating return to supine on his own. Encouraged him to remain sitting, but pt returning to supine.   Pt completed sit to supine w/ min a o assistance     Goal #2 Patient is able to demonstrate transfers EOB to/from Chair/Wheelchair at assistance level: moderate assistance     Goal #3    Goal #4    Goal #5    Goal #6    Goal Comments: Goals established on 5/21/2021

## 2021-08-12 ENCOUNTER — HOSPITAL ENCOUNTER (INPATIENT)
Facility: HOSPITAL | Age: 76
LOS: 13 days | Discharge: HOSPICE | DRG: 871 | End: 2021-08-25
Attending: EMERGENCY MEDICINE | Admitting: INTERNAL MEDICINE
Payer: MEDICARE

## 2021-08-12 ENCOUNTER — APPOINTMENT (OUTPATIENT)
Dept: GENERAL RADIOLOGY | Facility: HOSPITAL | Age: 76
DRG: 871 | End: 2021-08-12
Attending: EMERGENCY MEDICINE
Payer: MEDICARE

## 2021-08-12 ENCOUNTER — APPOINTMENT (OUTPATIENT)
Dept: GENERAL RADIOLOGY | Facility: HOSPITAL | Age: 76
DRG: 871 | End: 2021-08-12
Attending: INTERNAL MEDICINE
Payer: MEDICARE

## 2021-08-12 DIAGNOSIS — Z45.02 ICD (IMPLANTABLE CARDIOVERTER-DEFIBRILLATOR) DISCHARGE: ICD-10-CM

## 2021-08-12 DIAGNOSIS — E87.6 HYPOKALEMIA: ICD-10-CM

## 2021-08-12 DIAGNOSIS — I47.2 VENTRICULAR TACHYCARDIA (PAROXYSMAL) (HCC): Primary | ICD-10-CM

## 2021-08-12 PROBLEM — Z71.89 GOALS OF CARE, COUNSELING/DISCUSSION: Status: ACTIVE | Noted: 2021-08-12

## 2021-08-12 PROBLEM — I47.29 VENTRICULAR TACHYCARDIA (PAROXYSMAL) (HCC): Status: ACTIVE | Noted: 2021-08-12

## 2021-08-12 LAB
ALBUMIN SERPL-MCNC: 3 G/DL (ref 3.4–5)
ALBUMIN/GLOB SERPL: 0.7 {RATIO} (ref 1–2)
ALP LIVER SERPL-CCNC: 61 U/L
ALT SERPL-CCNC: 15 U/L
ANION GAP SERPL CALC-SCNC: 12 MMOL/L (ref 0–18)
ANION GAP SERPL CALC-SCNC: 9 MMOL/L (ref 0–18)
APTT PPP: 35.4 SECONDS (ref 25.4–36.1)
ARTERIAL PATENCY WRIST A: POSITIVE
ARTERIAL PATENCY WRIST A: POSITIVE
AST SERPL-CCNC: 20 U/L (ref 15–37)
ATRIAL RATE: 83 BPM
BASE EXCESS BLDA CALC-SCNC: -6.4 MMOL/L (ref ?–2)
BASE EXCESS BLDA CALC-SCNC: -7.3 MMOL/L (ref ?–2)
BASOPHILS # BLD AUTO: 0.02 X10(3) UL (ref 0–0.2)
BASOPHILS NFR BLD AUTO: 0.2 %
BILIRUB SERPL-MCNC: 0.8 MG/DL (ref 0.1–2)
BILIRUB UR QL STRIP.AUTO: NEGATIVE
BODY TEMPERATURE: 98.6 F
BODY TEMPERATURE: 98.6 F
BUN BLD-MCNC: 12 MG/DL (ref 7–18)
BUN BLD-MCNC: 7 MG/DL (ref 7–18)
CA-I BLD-SCNC: 1.21 MMOL/L (ref 1.12–1.32)
CA-I BLD-SCNC: 1.22 MMOL/L (ref 1.12–1.32)
CALCIUM BLD-MCNC: 8.4 MG/DL (ref 8.5–10.1)
CALCIUM BLD-MCNC: 8.9 MG/DL (ref 8.5–10.1)
CHLORIDE SERPL-SCNC: 105 MMOL/L (ref 98–112)
CHLORIDE SERPL-SCNC: 110 MMOL/L (ref 98–112)
CO2 SERPL-SCNC: 23 MMOL/L (ref 21–32)
CO2 SERPL-SCNC: 26 MMOL/L (ref 21–32)
COHGB MFR BLD: 1 % SAT (ref 0–3)
COHGB MFR BLD: 1 % SAT (ref 0–3)
COLOR UR AUTO: YELLOW
CREAT BLD-MCNC: 0.77 MG/DL
CREAT BLD-MCNC: 1.29 MG/DL
EOSINOPHIL # BLD AUTO: 0.01 X10(3) UL (ref 0–0.7)
EOSINOPHIL NFR BLD AUTO: 0.1 %
ERYTHROCYTE [DISTWIDTH] IN BLOOD BY AUTOMATED COUNT: 15.9 %
EXPIRATORY PRESSURE: 6 CM H2O
FIO2: 100 %
FIO2: 100 %
GLOBULIN PLAS-MCNC: 4.1 G/DL (ref 2.8–4.4)
GLUCOSE BLD-MCNC: 121 MG/DL (ref 70–99)
GLUCOSE BLD-MCNC: 135 MG/DL (ref 70–99)
GLUCOSE BLD-MCNC: 142 MG/DL (ref 70–99)
GLUCOSE BLD-MCNC: 89 MG/DL (ref 70–99)
GLUCOSE UR STRIP.AUTO-MCNC: NEGATIVE MG/DL
HAV IGM SER QL: 1.6 MG/DL (ref 1.6–2.6)
HAV IGM SER QL: 1.8 MG/DL (ref 1.6–2.6)
HCO3 BLDA-SCNC: 17.2 MEQ/L (ref 22–26)
HCO3 BLDA-SCNC: 18.6 MEQ/L (ref 22–26)
HCT VFR BLD AUTO: 39.6 %
HGB BLD-MCNC: 13.4 G/DL
HGB BLD-MCNC: 13.5 G/DL
HGB BLD-MCNC: 13.7 G/DL
IMM GRANULOCYTES # BLD AUTO: 0.03 X10(3) UL (ref 0–1)
IMM GRANULOCYTES NFR BLD: 0.4 %
INR BLD: 0.98 (ref 0.89–1.11)
INSP PRESSURE: 12 CM H2O
LACTATE BLDA-SCNC: 5.4 MMOL/L (ref 0.5–2)
LACTATE BLDA-SCNC: 6.2 MMOL/L (ref 0.5–2)
LYMPHOCYTES # BLD AUTO: 1.26 X10(3) UL (ref 1–4)
LYMPHOCYTES NFR BLD AUTO: 15.5 %
M PROTEIN MFR SERPL ELPH: 7.1 G/DL (ref 6.4–8.2)
MCH RBC QN AUTO: 32 PG (ref 26–34)
MCHC RBC AUTO-ENTMCNC: 34.1 G/DL (ref 31–37)
MCV RBC AUTO: 93.8 FL
METHGB MFR BLD: 0.6 % SAT (ref 0.4–1.5)
METHGB MFR BLD: 0.9 % SAT (ref 0.4–1.5)
MONOCYTES # BLD AUTO: 0.42 X10(3) UL (ref 0.1–1)
MONOCYTES NFR BLD AUTO: 5.2 %
NEUTROPHILS # BLD AUTO: 6.37 X10 (3) UL (ref 1.5–7.7)
NEUTROPHILS # BLD AUTO: 6.37 X10(3) UL (ref 1.5–7.7)
NEUTROPHILS NFR BLD AUTO: 78.6 %
NITRITE UR QL STRIP.AUTO: NEGATIVE
NT-PROBNP SERPL-MCNC: 3899 PG/ML (ref ?–450)
OSMOLALITY SERPL CALC.SUM OF ELEC: 296 MOSM/KG (ref 275–295)
OSMOLALITY SERPL CALC.SUM OF ELEC: 296 MOSM/KG (ref 275–295)
P AXIS: 54 DEGREES
P-R INTERVAL: 200 MS
P/F RATIO: 193.8 MMHG
P/F RATIO: 92.5 MMHG
PCO2 BLDA: 32 MM HG (ref 35–45)
PCO2 BLDA: 35 MM HG (ref 35–45)
PEEP: 5 CM H2O
PH BLDA: 7.34 [PH] (ref 7.35–7.45)
PH BLDA: 7.35 [PH] (ref 7.35–7.45)
PH UR STRIP.AUTO: 5 [PH] (ref 5–8)
PLATELET # BLD AUTO: 218 10(3)UL (ref 150–450)
PO2 BLDA: 41 MM HG (ref 80–105)
PO2 BLDA: 93 MM HG (ref 80–105)
POTASSIUM BLD-SCNC: 2.8 MMOL/L (ref 3.6–5.1)
POTASSIUM BLD-SCNC: 2.8 MMOL/L (ref 3.6–5.1)
POTASSIUM SERPL-SCNC: 2.1 MMOL/L (ref 3.5–5.1)
POTASSIUM SERPL-SCNC: 2.4 MMOL/L (ref 3.5–5.1)
POTASSIUM SERPL-SCNC: 2.6 MMOL/L (ref 3.5–5.1)
PROT UR STRIP.AUTO-MCNC: 30 MG/DL
PSA SERPL DL<=0.01 NG/ML-MCNC: 13.2 SECONDS (ref 12.2–14.5)
Q-T INTERVAL: 322 MS
QRS DURATION: 124 MS
QTC CALCULATION (BEZET): 378 MS
R AXIS: 7 DEGREES
RBC # BLD AUTO: 4.22 X10(6)UL
SAO2 % BLDA FROM PO2: 71 % (ref 92–100)
SAO2 % BLDA FROM PO2: 96 % (ref 92–100)
SAO2 % BLDA: 71 % (ref 92–100)
SAO2 % BLDA: 96 % (ref 92–100)
SARS-COV-2 RNA RESP QL NAA+PROBE: NOT DETECTED
SODIUM BLD-SCNC: 142 MMOL/L (ref 136–144)
SODIUM BLD-SCNC: 143 MMOL/L (ref 136–144)
SODIUM SERPL-SCNC: 142 MMOL/L (ref 136–145)
SODIUM SERPL-SCNC: 143 MMOL/L (ref 136–145)
SP GR UR STRIP.AUTO: 1.01 (ref 1–1.03)
T AXIS: 33 DEGREES
TIDAL VOLUME: 500 ML
TROPONIN I SERPL-MCNC: 0.31 NG/ML (ref ?–0.04)
TROPONIN I SERPL-MCNC: 0.38 NG/ML (ref ?–0.04)
UROBILINOGEN UR STRIP.AUTO-MCNC: <2 MG/DL
VENT RATE: 20 /MIN
VENTRICULAR RATE: 83 BPM
WBC # BLD AUTO: 8.1 X10(3) UL (ref 4–11)

## 2021-08-12 PROCEDURE — 0BH17EZ INSERTION OF ENDOTRACHEAL AIRWAY INTO TRACHEA, VIA NATURAL OR ARTIFICIAL OPENING: ICD-10-PCS | Performed by: INTERNAL MEDICINE

## 2021-08-12 PROCEDURE — 71045 X-RAY EXAM CHEST 1 VIEW: CPT | Performed by: EMERGENCY MEDICINE

## 2021-08-12 PROCEDURE — 02HV33Z INSERTION OF INFUSION DEVICE INTO SUPERIOR VENA CAVA, PERCUTANEOUS APPROACH: ICD-10-PCS | Performed by: INTERNAL MEDICINE

## 2021-08-12 PROCEDURE — 71045 X-RAY EXAM CHEST 1 VIEW: CPT | Performed by: INTERNAL MEDICINE

## 2021-08-12 PROCEDURE — 99223 1ST HOSP IP/OBS HIGH 75: CPT | Performed by: INTERNAL MEDICINE

## 2021-08-12 PROCEDURE — 5A1945Z RESPIRATORY VENTILATION, 24-96 CONSECUTIVE HOURS: ICD-10-PCS | Performed by: INTERNAL MEDICINE

## 2021-08-12 PROCEDURE — 99222 1ST HOSP IP/OBS MODERATE 55: CPT | Performed by: CLINICAL NURSE SPECIALIST

## 2021-08-12 RX ORDER — FAMOTIDINE 10 MG/ML
20 INJECTION, SOLUTION INTRAVENOUS 2 TIMES DAILY
Status: DISCONTINUED | OUTPATIENT
Start: 2021-08-12 | End: 2021-08-25

## 2021-08-12 RX ORDER — POTASSIUM CHLORIDE 1.5 G/1.77G
40 POWDER, FOR SOLUTION ORAL EVERY 4 HOURS
Status: COMPLETED | OUTPATIENT
Start: 2021-08-12 | End: 2021-08-13

## 2021-08-12 RX ORDER — ONDANSETRON 2 MG/ML
INJECTION INTRAMUSCULAR; INTRAVENOUS
Status: COMPLETED
Start: 2021-08-12 | End: 2021-08-12

## 2021-08-12 RX ORDER — FUROSEMIDE 10 MG/ML
20 INJECTION INTRAMUSCULAR; INTRAVENOUS ONCE
Status: COMPLETED | OUTPATIENT
Start: 2021-08-12 | End: 2021-08-12

## 2021-08-12 RX ORDER — ONDANSETRON 2 MG/ML
4 INJECTION INTRAMUSCULAR; INTRAVENOUS ONCE
Status: DISCONTINUED | OUTPATIENT
Start: 2021-08-12 | End: 2021-08-12

## 2021-08-12 RX ORDER — MAGNESIUM SULFATE HEPTAHYDRATE 40 MG/ML
2 INJECTION, SOLUTION INTRAVENOUS ONCE
Status: COMPLETED | OUTPATIENT
Start: 2021-08-12 | End: 2021-08-12

## 2021-08-12 RX ORDER — ONDANSETRON 2 MG/ML
4 INJECTION INTRAMUSCULAR; INTRAVENOUS EVERY 6 HOURS PRN
Status: DISCONTINUED | OUTPATIENT
Start: 2021-08-12 | End: 2021-08-25

## 2021-08-12 RX ORDER — FUROSEMIDE 10 MG/ML
INJECTION INTRAMUSCULAR; INTRAVENOUS
Status: DISPENSED
Start: 2021-08-12 | End: 2021-08-12

## 2021-08-12 RX ORDER — LORAZEPAM 2 MG/ML
INJECTION INTRAMUSCULAR
Status: COMPLETED
Start: 2021-08-12 | End: 2021-08-12

## 2021-08-12 RX ORDER — ONDANSETRON 2 MG/ML
4 INJECTION INTRAMUSCULAR; INTRAVENOUS ONCE
Status: COMPLETED | OUTPATIENT
Start: 2021-08-12 | End: 2021-08-12

## 2021-08-12 RX ORDER — ACETAMINOPHEN 325 MG/1
650 TABLET ORAL EVERY 6 HOURS PRN
Status: DISCONTINUED | OUTPATIENT
Start: 2021-08-12 | End: 2021-08-25

## 2021-08-12 RX ORDER — SODIUM PHOSPHATE, DIBASIC AND SODIUM PHOSPHATE, MONOBASIC 7; 19 G/133ML; G/133ML
1 ENEMA RECTAL ONCE AS NEEDED
Status: DISCONTINUED | OUTPATIENT
Start: 2021-08-12 | End: 2021-08-25

## 2021-08-12 RX ORDER — FUROSEMIDE 10 MG/ML
40 INJECTION INTRAMUSCULAR; INTRAVENOUS ONCE
Status: COMPLETED | OUTPATIENT
Start: 2021-08-12 | End: 2021-08-12

## 2021-08-12 RX ORDER — ACETAMINOPHEN 650 MG/1
650 SUPPOSITORY RECTAL EVERY 6 HOURS PRN
Status: DISCONTINUED | OUTPATIENT
Start: 2021-08-12 | End: 2021-08-25

## 2021-08-12 RX ORDER — ONDANSETRON 2 MG/ML
4 INJECTION INTRAMUSCULAR; INTRAVENOUS EVERY 4 HOURS PRN
Status: DISCONTINUED | OUTPATIENT
Start: 2021-08-12 | End: 2021-08-12

## 2021-08-12 RX ORDER — MIDAZOLAM HYDROCHLORIDE 1 MG/ML
2 INJECTION INTRAMUSCULAR; INTRAVENOUS EVERY 5 MIN PRN
Status: DISCONTINUED | OUTPATIENT
Start: 2021-08-12 | End: 2021-08-25

## 2021-08-12 RX ORDER — SODIUM CHLORIDE 9 MG/ML
INJECTION, SOLUTION INTRAVENOUS CONTINUOUS
Status: DISCONTINUED | OUTPATIENT
Start: 2021-08-12 | End: 2021-08-12

## 2021-08-12 RX ORDER — LIDOCAINE HYDROCHLORIDE 10 MG/ML
5 INJECTION, SOLUTION EPIDURAL; INFILTRATION; INTRACAUDAL; PERINEURAL ONCE
Status: COMPLETED | OUTPATIENT
Start: 2021-08-12 | End: 2021-08-12

## 2021-08-12 RX ORDER — BISACODYL 10 MG
10 SUPPOSITORY, RECTAL RECTAL
Status: DISCONTINUED | OUTPATIENT
Start: 2021-08-12 | End: 2021-08-16

## 2021-08-12 RX ORDER — AMIODARONE HYDROCHLORIDE 50 MG/ML
150 INJECTION, SOLUTION INTRAVENOUS ONCE
Status: COMPLETED | OUTPATIENT
Start: 2021-08-12 | End: 2021-08-12

## 2021-08-12 RX ORDER — SODIUM CHLORIDE 9 MG/ML
125 INJECTION, SOLUTION INTRAVENOUS CONTINUOUS
Status: DISCONTINUED | OUTPATIENT
Start: 2021-08-12 | End: 2021-08-12

## 2021-08-12 RX ORDER — ACETAMINOPHEN 160 MG/5ML
650 SOLUTION ORAL EVERY 6 HOURS PRN
Status: DISCONTINUED | OUTPATIENT
Start: 2021-08-12 | End: 2021-08-25

## 2021-08-12 RX ORDER — DEXMEDETOMIDINE HYDROCHLORIDE 4 UG/ML
INJECTION, SOLUTION INTRAVENOUS CONTINUOUS
Status: DISCONTINUED | OUTPATIENT
Start: 2021-08-12 | End: 2021-08-17

## 2021-08-12 RX ORDER — ETOMIDATE 2 MG/ML
INJECTION INTRAVENOUS
Status: COMPLETED | OUTPATIENT
Start: 2021-08-12 | End: 2021-08-12

## 2021-08-12 RX ORDER — LORAZEPAM 2 MG/ML
0.5 INJECTION INTRAMUSCULAR ONCE
Status: COMPLETED | OUTPATIENT
Start: 2021-08-12 | End: 2021-08-12

## 2021-08-12 RX ORDER — ENOXAPARIN SODIUM 100 MG/ML
40 INJECTION SUBCUTANEOUS DAILY
Status: DISCONTINUED | OUTPATIENT
Start: 2021-08-12 | End: 2021-08-25

## 2021-08-12 RX ORDER — CHLORHEXIDINE GLUCONATE 0.12 MG/ML
15 RINSE ORAL
Status: DISCONTINUED | OUTPATIENT
Start: 2021-08-12 | End: 2021-08-17 | Stop reason: ALTCHOICE

## 2021-08-12 RX ORDER — AMIODARONE HYDROCHLORIDE 50 MG/ML
INJECTION, SOLUTION INTRAVENOUS
Status: COMPLETED
Start: 2021-08-12 | End: 2021-08-12

## 2021-08-12 RX ORDER — POLYETHYLENE GLYCOL 3350 17 G/17G
17 POWDER, FOR SOLUTION ORAL DAILY PRN
Status: DISCONTINUED | OUTPATIENT
Start: 2021-08-12 | End: 2021-08-16

## 2021-08-12 NOTE — PAYOR COMM NOTE
--------------  ADMISSION REVIEW     Payor: [employer] ADV PPO  Subscriber #:  [id number]  Authorization Number: [id number]       ED Provider Notes          History   Patient presents with:  Chest Pain Angina    Stated Complaint: chest pain    HPI/Subje noted.   GENERAL: Patient is awake and alert, upright on the cart, with slightly increased work of breathing. HEENT: Head is without evidence of trauma. Extraocular muscles are intact. Pupils are equal and reactive to light.    NECK: Neck is supple and no Arterial 6.2 (*)     All other components within normal limits   PROTHROMBIN TIME (PT) - Normal   PTT, ACTIVATED - Normal   MAGNESIUM - Normal   RAPID SARS-COV-2 BY PCR - Normal   CBC WITH DIFFERENTIAL WITH PLATELET    Narrative:        EKG: The EKG reveale was addressed with IV Lasix, without improvement. Patient was placed on BiPAP. ABG was reviewed. Review of medical records state full CODE STATUS. Repeat attempts to reach family were unsuccessful.     Patient was intubated to secure the airway and im 125 lb (56.7 kg)   SpO2 100%   BMI 17.94 kg/m²   General: No acute distress. HEENT: Normocephalic atraumatic. Moist mucous membranes. EOM-I. PERRLA. Anicteric. Neck: No lymphadenopathy. No JVD. No carotid bruits.   Respiratory: Clear to auscultation bila pulmonary consult. Family could not be reached. GOC discussion to be further pursued today. UA also noted. Will place on empiric ceftriaxone until culture is back         8/12 PULM    ASSESSMENT/PLAN:  1.  Acute hypoxic resp failure- due to AICD discharge hypertension  20. Cerebrovascular disease  21. Malnutrition   22.  Memory impairment, nondecisional     Patient with admission for VT d/t hypokalemia resulting in shock 5/2021  Neuropsych consulted, patient deemed not decisional.  Palliative care and hospic Intravenous Curtis Ward RN    8/12/2021 0531 Rate/Dose Change 10 mcg/kg/min × 56.7 kg Intravenous Curtis Ward RN    8/12/2021 0524 Rate/Dose Change 12 mcg/kg/min × 56.7 kg Intravenous Curtis Ward RN    8/12/2021 0513 Rate/Dose Change 1 94 %  —  —  — TV    08/12/21 0457  —  92  20  157/86  96 %  —  —  — TV    08/12/21 0451  —  97  22  (!) 138/92  (!) 88 %  —  —  — TV    08/12/21 0449  —  96  14  115/89  91 %  —  —  — TV    08/12/21 0448  —  92  (!) 37  114/74  (!) 86 %  —  —  — TV    08/1

## 2021-08-12 NOTE — PROGRESS NOTES
[facility]  Progress Note     Amilcar Champagne Patient Status:  Inpatient    1945 MRN [id number]   [facility] 6NE-A Attending Dr. Carlos Mcclellan Day # 0 PCP Darin Wyatt MD     Chief Complaint: intubated    S: Patient intubated/a input(s): CK in the last 168 hours. Inflammatory Markers  No results for input(s): CRP, VISHNU, LDH, DDIMER in the last 168 hours. Imaging: Imaging data reviewed in Epic.     Medications:   • potassium chloride 40mEq IVPB (peripheral line)  40 mEq [place] at bedside w/ palliative apn. We reviewed the past few months at home with hospice. He hasn't been eating but she feels he was sound of mind.   We reviewed past neuropsych eval.  She reports watching him get shocked was scary and that he made the decision

## 2021-08-12 NOTE — PLAN OF CARE
Received this am intubated. Dr. Vandana Luong consulted pulm Dr. Haja Light consulted [facility] Dr. Paolo Reina here to see. Patient is full code . Dr. Paolo Reina discussed care plan with wife . Daughter called wanting to speak to MD Nikole Hunt Saybill message he was on phone with wife.  See roselyn

## 2021-08-12 NOTE — CONSULTS
Margot 62  [id number]  Hospital Day #0  Date of Consult: 08/12/21  Patient seen at: [facility]    Reason for Consultation:      Consult requested by Dr. Demetri Hawk for evaluation of pallia PRN  acetaminophen, 650 mg, Q6H PRN   Or  acetaminophen, 650 mg, Q6H PRN   Or  acetaminophen, 650 mg, Q6H PRN  fentanyl,  mcg/hr, Continuous PRN  PEG 3350, 17 g, Daily PRN  magnesium hydroxide, 30 mL, Daily PRN  bisacodyl, 10 mg, Daily PRN  Fleet Yashira 10 mg, 10 mg, Rectal, Daily PRN  Fleet Enema (FLEET) 7-19 GM/118ML enema 133 mL, 1 enema, Rectal, Once PRN  Chlorhexidine Gluconate (PERIDEX) 0.12 % solution 15 mL, 15 mL, Mouth/Throat, [email or web address]  Midazolam HCl (VERSED) 2 MG/2ML injection 2 mg, 2 mg, I 1. Interval intubation. ETT tip approximately 2 cm from the miah. 2. NG tube. Tip proximal stomach, with side-hole marker distal esophagus. Advancement may offer more optimal position within the stomach.  3. Increasing opacities at the right lung base perform hobby Occasional  Assist Normal or   Reduced Full or confused   50 Mainly sit/lie Extensive Disease  Can't do any work   Partial Assist Normal or Reduced Full or confused   40 Mainly in bed Extensive Disease  Can't do any work Mainly Assist Normal Based on LETA's discussion, it appears her mom does not have good insight into Mario's overall poor state of health.  They were agreeable to hospice care to some extent because of the more robust services and support they were receiving but were not really surrogate decision maker. Daughter Adalgisa Thrasher is allowing her final decision making but is the one to be receiving most of the medical information. Spouse to be visiting later.  Discussed it is important for spouse to hear as much information directly from provider Goals of care, counseling/discussion      Palliative Care Recommendations/Plan:     1.  Goals of Care:  Patient/family have rescinded home hospice care and have requested continued aggressive ICU management of respiratory and cardiac failure with documented

## 2021-08-12 NOTE — ED QUICK NOTES
pts hospice nurse at the bedside. Per hospice RN pt was having abdominal pain, pt had insisted on coming to the ER. Per hospice RN if aggressive treatment is to be given pt needs to come off of hospice services. MD notified.

## 2021-08-12 NOTE — CONSULTS
Quan/Silvio Report of Consultation      Blanquita Rasconu Patient Status:  Inpatient    1945 MRN [id number]   [facility] 6NE-A Attending Lisy Timmons MD   Hosp Day # 0 PCP Florence Castillo MD     Reason for Consultation   ICD shocks History:   Diagnosis Date   • AICD (automatic cardioverter/defibrillator) present    • [facility]    • Congestive heart disease ([facility]    • High blood pressure    • High cholesterol    • Hypotension    • Stroke ([facility]     2007   • Visual impairment      Pa 15 mL, Mouth/Throat, [email or web address]  •  Midazolam HCl (VERSED) 2 MG/2ML injection 2 mg, 2 mg, Intravenous, Q5 Min PRN  •  Dexmedetomidine HCl in NaCl (PRECEDEX) 400 MCG/100ML premix infusion, 0.2-1.5 mcg/kg/hr, Intravenous, Continuous  •  famoTIDine (PEPCID Creatinine (mg/dL)   Date Value   08/12/2021 1.29   08/12/2021 0.77   05/21/2021 0.72     Lab Results   Component Value Date    INR 0.98 08/12/2021    INR 0.98 05/20/2021    INR 1.01 12/26/2020         Modesto Burris MD  8/12/2021  6:45 PM

## 2021-08-12 NOTE — ED INITIAL ASSESSMENT (HPI)
Per medics pt is nonverbal, family reports pt has been pointing to his chest since 2045 this evening. Pt had an episode of emesis and family gave him morphine and haldol.      Per medics at [facility] pt went into [facility] and was shocked by his defibrillator/pacema

## 2021-08-12 NOTE — CONSULTS
[facility] Patient Status:  Inpatient    1945 MRN [id number]   [facility] 6NE-A Attending Manju Peraza MD   Hosp Day # 0 PCP Zaki Hernandez MD     Date of Admission: 2021  Admission Diagnosis: Hypokalemia 40 mg, 40 mg, Subcutaneous, Daily  •  ondansetron (ZOFRAN) injection 4 mg, 4 mg, Intravenous, Q6H PRN  •  cefTRIAXone Sodium (ROCEPHIN) 1 g in sodium chloride 0.9% 100 mL IVPB-ADDV, 1 g, Intravenous, Q24H     REVIEW OF SYSTEMS:   As listed in HPI, otherwis 08/12/2021    AST 20 08/12/2021    BILT 0.8 08/12/2021    ALB 3.0 08/12/2021    TP 7.1 08/12/2021     Lab Results   Component Value Date    INR 0.98 08/12/2021    INR 0.98 05/20/2021    INR 1.01 12/26/2020        Imaging: CXR: bibasilar opacities, R>L, ETT

## 2021-08-12 NOTE — ED QUICK NOTES
Per hospice RN, pt is no longer on hospice services since aggressive treatment has been given. Hospice Rn states she spoke with pts daughter on the phone to notify. Per hospice RN pt is a full code.

## 2021-08-12 NOTE — ED QUICK NOTES
Pt hypoxic, MD at the bedside, O2 sats in the 80s. Pt appears to be trying to spit up, pt suctioned, large amount of output being suctioned out as pt is spitting up.  Zofran ordered and administered per MD order and RT called to bedside to place pt on bipap

## 2021-08-12 NOTE — H&P
[facility]  History and Physical     Gearld Magic Patient Status:  Emergency    1945 MRN [id number]   Location [address] Attending Mahnaz Villagomez MD   Hosp Day # 0 PCP Tori Snell MD     Chief Complaint: Jeanine Art Disp: 30 tablet, Rfl: 3  docusate sodium 100 MG Oral Cap, Take 1 capsule (100 mg total) by mouth 2 (two) times daily. , Disp: 60 capsule, Rfl: 0  Mexiletine HCl 150 MG Oral Cap, Take 1 capsule (150 mg total) by mouth every 12 (twelve) hours. , Disp: 60 capsu rhonchi. Cardiovascular: S1, S2. Regular rate and rhythm. Chest and Back: No tenderness or deformity. Abdomen: Soft, nontender, nondistended.   Positive bowel sounds   Neurologic: right side weakness due to old stroke   Musculoskeletal: Moves all extrem exacerbation. Lasix given in ER. Admit to ICU. Cardiology and pulmonary consult. Family could not be reached. GOC discussion to be further pursued today. UA also noted.  Will place on empiric ceftriaxone until culture is back   Quality:  · DVT Prophylaxis

## 2021-08-12 NOTE — PROGRESS NOTES
Per MD, placed patient on bipap 12/6/100%. Obtained abg. RN and MD at bedside. Will continue to monitor.         08/12/21 0430   BiPAP   $ RT Standby Charge (per 15 min) 1   $ BiPAP Initial day & set up Yes   Device V60   BiPAP / CPAP CE# [id number]   BiPAP ba

## 2021-08-12 NOTE — PROGRESS NOTES
Per MD, patient was intubated in ER by MD. 7.5 ETT size. Vent settings: VC+ 20/500/100%/+5. Equal lung sounds. SpO2 93%. RN and MD at bedside. Will obtain follow up abg per MD order/ protocol. Will continue to monitor.       08/12/21 1002   Vent Information

## 2021-08-12 NOTE — ED QUICK NOTES
Multiple calls left for pts daughter and spouse listed on face sheet. Messages left for family to call back. Per chart pt is a full code, no advance directive on chart. Per hospice RN pt is a full code.

## 2021-08-13 LAB
ANION GAP SERPL CALC-SCNC: 8 MMOL/L (ref 0–18)
ARTERIAL PATENCY WRIST A: POSITIVE
ATRIAL RATE: 96 BPM
BASE EXCESS BLDA CALC-SCNC: -5 MMOL/L (ref ?–2)
BASOPHILS # BLD: 0 X10(3) UL (ref 0–0.2)
BASOPHILS NFR BLD: 0 %
BENZODIAZ UR QL SCN: NEGATIVE
BODY TEMPERATURE: 98.6 F
BUN BLD-MCNC: 14 MG/DL (ref 7–18)
C DIFF TOX B STL QL: NEGATIVE
CA-I BLD-SCNC: 1.17 MMOL/L (ref 1.12–1.32)
CALCIUM BLD-MCNC: 8.1 MG/DL (ref 8.5–10.1)
CANNABINOIDS UR QL SCN: NEGATIVE
CHLORIDE SERPL-SCNC: 108 MMOL/L (ref 98–112)
CO2 SERPL-SCNC: 23 MMOL/L (ref 21–32)
COCAINE UR QL: NEGATIVE
COHGB MFR BLD: 0.8 % SAT (ref 0–3)
CORTIS SERPL-MCNC: 53.6 UG/DL
CREAT BLD-MCNC: 1.05 MG/DL
CREAT UR-SCNC: 96.6 MG/DL
EOSINOPHIL # BLD: 0 X10(3) UL (ref 0–0.7)
EOSINOPHIL NFR BLD: 0 %
ERYTHROCYTE [DISTWIDTH] IN BLOOD BY AUTOMATED COUNT: 16 %
FIO2: 40 %
GLUCOSE BLD-MCNC: 113 MG/DL (ref 70–99)
GLUCOSE BLD-MCNC: 117 MG/DL (ref 70–99)
GLUCOSE BLD-MCNC: 144 MG/DL (ref 70–99)
HAV IGM SER QL: 2 MG/DL (ref 1.6–2.6)
HCO3 BLDA-SCNC: 18.5 MEQ/L (ref 22–26)
HCT VFR BLD AUTO: 31.9 %
HGB BLD-MCNC: 10.7 G/DL
HGB BLD-MCNC: 11.8 G/DL
LACTATE BLDA-SCNC: 2.5 MMOL/L (ref 0.5–2)
LYMPHOCYTES NFR BLD: 1.57 X10(3) UL (ref 1–4)
LYMPHOCYTES NFR BLD: 12 %
MCH RBC QN AUTO: 31.9 PG (ref 26–34)
MCHC RBC AUTO-ENTMCNC: 33.5 G/DL (ref 31–37)
MCV RBC AUTO: 95.2 FL
MDMA UR QL SCN: NEGATIVE
METAMYELOCYTES # BLD: 0.13 X10(3) UL
METAMYELOCYTES NFR BLD: 1 %
METHGB MFR BLD: 0.7 % SAT (ref 0.4–1.5)
MONOCYTES # BLD: 0.52 X10(3) UL (ref 0.1–1)
MONOCYTES NFR BLD: 4 %
MORPHOLOGY: NORMAL
MYELOCYTES # BLD: 0.26 X10(3) UL
MYELOCYTES NFR BLD: 2 %
NEUTROPHILS # BLD AUTO: 11.01 X10 (3) UL (ref 1.5–7.7)
NEUTROPHILS NFR BLD: 37 %
NEUTS BAND NFR BLD: 44 %
NEUTS HYPERSEG # BLD: 10.61 X10(3) UL (ref 1.5–7.7)
OSMOLALITY SERPL CALC.SUM OF ELEC: 291 MOSM/KG (ref 275–295)
OXYCODONE UR QL SCN: NEGATIVE
P AXIS: 55 DEGREES
P-R INTERVAL: 172 MS
P/F RATIO: 350.2 MMHG
PCO2 BLDA: 30 MM HG (ref 35–45)
PEEP: 5 CM H2O
PH BLDA: 7.41 [PH] (ref 7.35–7.45)
PHOSPHATE SERPL-MCNC: 1.8 MG/DL (ref 2.5–4.9)
PLATELET # BLD AUTO: 150 10(3)UL (ref 150–450)
PLATELET MORPHOLOGY: NORMAL
PO2 BLDA: 140 MM HG (ref 80–105)
POTASSIUM BLD-SCNC: 2.8 MMOL/L (ref 3.6–5.1)
POTASSIUM SERPL-SCNC: 3 MMOL/L (ref 3.5–5.1)
POTASSIUM SERPL-SCNC: 3.2 MMOL/L (ref 3.5–5.1)
PRESSURE SUPPORT: 5 CM H2O
Q-T INTERVAL: 440 MS
QRS DURATION: 118 MS
QTC CALCULATION (BEZET): 555 MS
R AXIS: 53 DEGREES
RBC # BLD AUTO: 3.35 X10(6)UL
SAO2 % BLDA FROM PO2: 99 % (ref 92–100)
SAO2 % BLDA: 98 % (ref 92–100)
SODIUM BLD-SCNC: 138 MMOL/L (ref 136–144)
SODIUM SERPL-SCNC: 139 MMOL/L (ref 136–145)
T AXIS: 6 DEGREES
T3FREE SERPL-MCNC: 0.58 PG/ML (ref 2.4–4.2)
T4 FREE SERPL-MCNC: 2.4 NG/DL (ref 0.8–1.7)
TOTAL CELLS COUNTED: 100
TSI SER-ACNC: 1.43 MIU/ML (ref 0.36–3.74)
VENTRICULAR RATE: 96 BPM
WBC # BLD AUTO: 13.1 X10(3) UL (ref 4–11)

## 2021-08-13 PROCEDURE — 99232 SBSQ HOSP IP/OBS MODERATE 35: CPT | Performed by: HOSPITALIST

## 2021-08-13 PROCEDURE — 99233 SBSQ HOSP IP/OBS HIGH 50: CPT | Performed by: CLINICAL NURSE SPECIALIST

## 2021-08-13 RX ORDER — AMIODARONE HYDROCHLORIDE 200 MG/1
200 TABLET ORAL DAILY
Status: DISCONTINUED | OUTPATIENT
Start: 2021-08-13 | End: 2021-08-25

## 2021-08-13 RX ORDER — LOPERAMIDE HCL 1 MG/7.5ML
2 SOLUTION ORAL 4 TIMES DAILY PRN
Status: DISCONTINUED | OUTPATIENT
Start: 2021-08-13 | End: 2021-08-25

## 2021-08-13 RX ORDER — POTASSIUM CHLORIDE 29.8 MG/ML
40 INJECTION INTRAVENOUS ONCE
Status: COMPLETED | OUTPATIENT
Start: 2021-08-13 | End: 2021-08-13

## 2021-08-13 RX ORDER — LOPERAMIDE HYDROCHLORIDE 2 MG/1
2 CAPSULE ORAL 4 TIMES DAILY PRN
Status: DISCONTINUED | OUTPATIENT
Start: 2021-08-13 | End: 2021-08-25

## 2021-08-13 RX ORDER — POTASSIUM CHLORIDE 14.9 MG/ML
20 INJECTION INTRAVENOUS ONCE
Status: DISCONTINUED | OUTPATIENT
Start: 2021-08-13 | End: 2021-08-13

## 2021-08-13 NOTE — PROGRESS NOTES
[facility]  Progress Note     [facility] Patient Status:  Inpatient    1945 MRN [id number]   [facility] 6NE-A Attending Dr. So Belcher Day # 1 PCP Homero Venegas MD     Chief Complaint: intubated    S: Patient intubated, 0.8  --   --   --   --    TP 7.1  --   --   --   --     < > = values in this interval not displayed. Estimated Creatinine Clearance: 41.8 mL/min (based on SCr of 1.05 mg/dL).     Recent Labs   Lab 08/12/21  0105   PTP 13.2   INR 0.98            COVID- hypertension  11. Cerebrovascular disease  12. Malnutrition, severe  1. Glucose stable  13.  Memory impairment, nondecisional    Quality:  · DVT Prophylaxis: lovenox  · CODE status: full - confirmed w/ dtr - ongoing discussion regarding code status/ICD/hosp

## 2021-08-13 NOTE — DIETARY MALNUTRITION NOTE
[facility]    NUTRITION ASSESSMENT    Pt meets severe malnutrition criteria.     CRITERIA FOR MALNUTRITION DIAGNOSIS:  Criteria for severe malnutrition diagnosis: acute illness/injury related to wt loss greater than 7.5% in 3 months, energy intake less 50.2 kg (110 lb 10.7 oz)  05/22/21 : 56.7 kg (125 lb)  01/19/21 : 67.6 kg (149 lb)  12/29/20 : 63.5 kg (139 lb 15.9 oz)  10/05/20 : 81.6 kg (180 lb)       NUTRITION:  Diet: Orders Placed This Encounter      NPO     Oral Supplements: n/a    Percent Meals Ea

## 2021-08-13 NOTE — CM/SW NOTE
66 yo pt admitted with hypoxia, ICD shock, CHF. Pt is currently intubated. Palliative care consult today, family desires aggressive management at this time. Was not able to reach pts wife or daughter earlier. Previous charts reviewed.  He has hx of

## 2021-08-13 NOTE — PROGRESS NOTES
Progress Note  Nevin Hogan Patient Status:  Inpatient    1945 MRN [id number]   [facility] 6NE-A Attending Kelsey Drake MD   Hosp Day # 1 PCP Wilfredo Yang MD     Subjective:   Intubated, sedated    Objective:  BP 92/57   Pulse 7 CTA  Abd: soft, NT/ND +bs  Ext: no edema  Skin: Warm, dry  Neuro: hx right sided hemiparesis        Medications:    • potassium phosphate IVPB  15 mmol Intravenous Once   • potassium chloride  40 mEq Intravenous Once   • enoxaparin  40 mg Subcutaneous Tj Krupa Garibay, [facility]  Cardiac Electrophysiolgy

## 2021-08-13 NOTE — PROGRESS NOTES
08/13/21 1459   Clinical Encounter Type   Visited With Patient; Health care provider   Patient Spiritual Encounters   Spiritual Interventions  provided emotional/spiritual care with patient/prayer at the bedside with him.    Patient appreciated ch

## 2021-08-13 NOTE — PLAN OF CARE
Assumed care of patient at 1. Patient is intubated and follows commands. R arm slightly contracted and patient unable to move from previous stroke. Left arm moderate strength and bilateral legs weak.  Levophed drip infusing and titrated to maintain SBP >

## 2021-08-13 NOTE — PLAN OF CARE
I spoke with pt, dtr and spouse at bedside. We reviewed possibility of extubation tomorrow and the need to have a decision on ICD/code status PRIOR TO EXTUBATION.   They are aware of the possibility of pt not doing well with weaning trial tomorrow and woul

## 2021-08-13 NOTE — PROGRESS NOTES
[address] [facility] Patient Status:  Inpatient    1945 MRN [id number]   [facility] 6NE-A Attending Dalila Ahumada, MD   [facility] Day # 1 PCP Norma Patel MD     Critical Care Progress Note     Date of Admission: 2021 12 MG/2ML injection 2 mg, 2 mg, Intravenous, Q5 Min PRN  •  Dexmedetomidine HCl in NaCl (PRECEDEX) 400 MCG/100ML premix infusion, 0.2-1.5 mcg/kg/hr, Intravenous, Continuous  •  famoTIDine (PEPCID) injection 20 mg, 20 mg, Intravenous, BID  •  Piperacillin Sod- Imaging: CXR: RLL infiltrate, lines in place. No edema/effusions     ASSESSMENT/PLAN:  1. Acute hypoxic resp failure- due to AICD discharge and +/- aspiration event.   - cont with full vent support  - will attempt SBT today- Tpiece if appropriate  - wean

## 2021-08-13 NOTE — PROGRESS NOTES
Pt remains on VC+ 12/450/40%/+5. ETT [email or web address]. No changes made over night. Will continue to monitor.       08/13/21 0523   Vent Information   $ RT Standby Charge (per 15 min) 1  (vent)   Ventilator Initiation 08/12/21   Ventilation Day(s) 2   Interface Invasi

## 2021-08-13 NOTE — PROGRESS NOTES
[address] Maya Follow Up    Raulito Stahl  [id number]  Patient seen at: [facility] Day #1    Subjective:      Patient currently resting.  Has been communicating with staff and family with gestures and yes/no noddi powder packet 17 g, 17 g, Oral, Daily PRN  •  magnesium hydroxide (MILK OF MAGNESIA) 400 MG/5ML suspension 30 mL, 30 mL, Oral, Daily PRN  •  bisacodyl (DULCOLAX) rectal suppository 10 mg, 10 mg, Rectal, Daily PRN  •  Fleet Enema (FLEET) 7-19 GM/118ML enema INDICATIONS:  Verify correct tube placement     PATIENT STATED HISTORY: (As transcribed by Technologist)  Patient offered no additional history at this time. FINDINGS:  The chest appears essentially stable.   An enteric tube overlies the expected combination of atelectasis and or pneumonia. 4. Continued clinical correlation recommended. Preliminary report given by [facility].                Dictated by (CST): Hoang Pereira MD on 8/12/2021 at 7:26 AM       Finalized by (CST): Hoang Pereira MD deferred to family. Psychiatric: Mood calm  Skin: warm and dry.     Palliative Performance Scale: 10%    Palliative Performance Scale   % Ambulation Activity Level Self-Care Intake Consciousness   100 Full Normal Full   Normal Full   90 Full No disease  N incorporate patient and family in above decisions but leaving final decisions to patient (if possible, again cautioned about his ability to fully comprehend the implication of his decisions) and her mom.       We also discussed the underlying chronic issue counseling/discussion      Palliative Care Recommendations/Plan:     1. Goals of Care:  Continue aggressive critical care management. See above for list of GOC/POC potential limitations discussed that family is still contemplating.    2. CODE STATUS: Full C

## 2021-08-14 ENCOUNTER — APPOINTMENT (OUTPATIENT)
Dept: GENERAL RADIOLOGY | Facility: HOSPITAL | Age: 76
DRG: 871 | End: 2021-08-14
Attending: INTERNAL MEDICINE
Payer: MEDICARE

## 2021-08-14 ENCOUNTER — APPOINTMENT (OUTPATIENT)
Dept: CT IMAGING | Facility: HOSPITAL | Age: 76
DRG: 871 | End: 2021-08-14
Attending: HOSPITALIST
Payer: MEDICARE

## 2021-08-14 LAB
ALBUMIN SERPL-MCNC: 2 G/DL (ref 3.4–5)
ANION GAP SERPL CALC-SCNC: 5 MMOL/L (ref 0–18)
BASOPHILS # BLD: 0 X10(3) UL (ref 0–0.2)
BASOPHILS NFR BLD: 0 %
BUN BLD-MCNC: 15 MG/DL (ref 7–18)
CALCIUM BLD-MCNC: 7.6 MG/DL (ref 8.5–10.1)
CHLORIDE SERPL-SCNC: 108 MMOL/L (ref 98–112)
CO2 SERPL-SCNC: 24 MMOL/L (ref 21–32)
CREAT BLD-MCNC: 0.93 MG/DL
EOSINOPHIL # BLD: 0 X10(3) UL (ref 0–0.7)
EOSINOPHIL NFR BLD: 0 %
ERYTHROCYTE [DISTWIDTH] IN BLOOD BY AUTOMATED COUNT: 15.9 %
GLUCOSE BLD-MCNC: 156 MG/DL (ref 70–99)
GLUCOSE BLD-MCNC: 161 MG/DL (ref 70–99)
HAV IGM SER QL: 1.7 MG/DL (ref 1.6–2.6)
HCT VFR BLD AUTO: 29.1 %
HGB BLD-MCNC: 9.9 G/DL
LYMPHOCYTES NFR BLD: 0.56 X10(3) UL (ref 1–4)
LYMPHOCYTES NFR BLD: 4 %
MCH RBC QN AUTO: 31.7 PG (ref 26–34)
MCHC RBC AUTO-ENTMCNC: 34 G/DL (ref 31–37)
MCV RBC AUTO: 93.3 FL
MONOCYTES # BLD: 0.56 X10(3) UL (ref 0.1–1)
MONOCYTES NFR BLD: 4 %
MORPHOLOGY: NORMAL
NEUTROPHILS # BLD AUTO: 12.43 X10 (3) UL (ref 1.5–7.7)
NEUTROPHILS NFR BLD: 74 %
NEUTS BAND NFR BLD: 18 %
NEUTS HYPERSEG # BLD: 12.97 X10(3) UL (ref 1.5–7.7)
OSMOLALITY SERPL CALC.SUM OF ELEC: 288 MOSM/KG (ref 275–295)
PHOSPHATE SERPL-MCNC: 1.5 MG/DL (ref 2.5–4.9)
PHOSPHATE SERPL-MCNC: 7 MG/DL (ref 2.5–4.9)
PLATELET # BLD AUTO: 144 10(3)UL (ref 150–450)
PLATELET MORPHOLOGY: NORMAL
POTASSIUM SERPL-SCNC: 3.7 MMOL/L (ref 3.5–5.1)
POTASSIUM SERPL-SCNC: 4 MMOL/L (ref 3.5–5.1)
RBC # BLD AUTO: 3.12 X10(6)UL
SODIUM SERPL-SCNC: 137 MMOL/L (ref 136–145)
TOTAL CELLS COUNTED: 100
WBC # BLD AUTO: 14.1 X10(3) UL (ref 4–11)

## 2021-08-14 PROCEDURE — 99233 SBSQ HOSP IP/OBS HIGH 50: CPT | Performed by: HOSPITALIST

## 2021-08-14 PROCEDURE — 74177 CT ABD & PELVIS W/CONTRAST: CPT | Performed by: HOSPITALIST

## 2021-08-14 PROCEDURE — 71045 X-RAY EXAM CHEST 1 VIEW: CPT | Performed by: INTERNAL MEDICINE

## 2021-08-14 RX ORDER — MAGNESIUM SULFATE HEPTAHYDRATE 40 MG/ML
2 INJECTION, SOLUTION INTRAVENOUS ONCE
Status: COMPLETED | OUTPATIENT
Start: 2021-08-14 | End: 2021-08-14

## 2021-08-14 RX ORDER — POTASSIUM CHLORIDE 14.9 MG/ML
20 INJECTION INTRAVENOUS ONCE
Status: COMPLETED | OUTPATIENT
Start: 2021-08-14 | End: 2021-08-14

## 2021-08-14 NOTE — PLAN OF CARE
Assumed care of patient at 0730. Received pt sedated on prcedex gtt on ventilator. Weaning levophed gtt as tolerated to keep sbp above 90 or map above 60. Immodium prn given for diarrhea. Pt atrial paced in 70s. Notified Dr. Oral Camarena when family arrived.  Per

## 2021-08-14 NOTE — PROGRESS NOTES
Progress Note  Janelle Kramer Patient Status:  Inpatient    1945 MRN [id number]   [facility] 6NE-A Attending Hilda Covington MD   Hosp Day # 2 PCP Meaghan Gotti MD     Subjective:   Intubated, sedated    Objective:  BP 97/59   Pulse 7 Mucous membranes moist.   Neck: no jvd  Cardiac: regular rate and rhythm, normal S1,S2  Lungs: CTA  Abd: soft, NT/ND +bs  Ext: no edema  Skin: Warm, dry  Neuro: hx right sided hemiparesis        Medications:    • sodium phosphate  30 mmol Intravenous Once

## 2021-08-14 NOTE — PLAN OF CARE
At change of shift, patient went into V-tach and AICD proceeded to shock patient. Patient went into atrial paced rhythm. Event discussed with Dr. Sigifredo Ryan, amio bolus given. No other episodes at this time. Daughter was updated on event.  Patient occasionally

## 2021-08-14 NOTE — PHYSICAL THERAPY NOTE
Order received for PT Evaluation, however pt is intubated and not appropriate for PT Evaluation today. Will follow up 8/15/21.

## 2021-08-14 NOTE — PROGRESS NOTES
[facility]  Progress Note     Oli Vicente Patient Status:  Inpatient    1945 MRN [id number]   [facility] 6NE-A Attending Dr. Jalloh  Day # 2 PCP Rambo Diaz MD     Chief Complaint: Respiratory failure, ICD shock    S 15*  --   --   --   --   --   --   --   --    BILT 0.8  --   --   --   --   --   --   --   --    TP 7.1  --   --   --   --   --   --   --   --     < > = values in this interval not displayed.        Estimated Creatinine Clearance: 50.8 mL/min (based on SCr documentation  · Jaeger: Yes  · If COVID testing is negative, may discontinue isolation: Yes    Doyle GAINES

## 2021-08-14 NOTE — PROGRESS NOTES
Met with family  At this time, patient will remain full code  If extubated and requires re-intubation, they would want that  ICD to remain on  If able to extubate and fails speech, PEG to be discussed     Doyle GAINES

## 2021-08-14 NOTE — PROGRESS NOTES
[address] [facility] Patient Status:  Inpatient    1945 MRN [id number]   [facility] 6NE-A Attending Hilda Covington MD   Hosp Day # 2 PCP Meaghan Gotti MD     Pulm / Critical Care Progress Note     S: remains intubated. Recent Labs   Lab 08/12/21  0105 08/13/21  0419 08/14/21  0424   WBC 8.1 13.1* 14.1*   HGB 13.5 10.7* 9.9*   HCT 39.6 31.9* 29.1*   .0 150.0 144.0*     Recent Labs   Lab 08/12/21  0105   INR 0.98         Recent Labs   Lab 08/12/21  1236 08/12/21 minutes      Patrice Vela M.D.  [facility] pulmonary / critical care  8/14/2021  9:22 AM

## 2021-08-15 LAB
ANION GAP SERPL CALC-SCNC: 8 MMOL/L (ref 0–18)
ARTERIAL PATENCY WRIST A: POSITIVE
ARTERIAL PATENCY WRIST A: POSITIVE
BASE EXCESS BLDA CALC-SCNC: -5.1 MMOL/L (ref ?–2)
BASE EXCESS BLDA CALC-SCNC: -5.8 MMOL/L (ref ?–2)
BASOPHILS # BLD AUTO: 0.01 X10(3) UL (ref 0–0.2)
BASOPHILS NFR BLD AUTO: 0.1 %
BODY TEMPERATURE: 98.6 F
BODY TEMPERATURE: 98.6 F
BUN BLD-MCNC: 8 MG/DL (ref 7–18)
CALCIUM BLD-MCNC: 7.6 MG/DL (ref 8.5–10.1)
CHLORIDE SERPL-SCNC: 106 MMOL/L (ref 98–112)
CO2 SERPL-SCNC: 21 MMOL/L (ref 21–32)
COHGB MFR BLD: 0.7 % SAT (ref 0–3)
COHGB MFR BLD: 0.7 % SAT (ref 0–3)
CREAT BLD-MCNC: 0.5 MG/DL
EOSINOPHIL # BLD AUTO: 0.07 X10(3) UL (ref 0–0.7)
EOSINOPHIL NFR BLD AUTO: 0.6 %
ERYTHROCYTE [DISTWIDTH] IN BLOOD BY AUTOMATED COUNT: 16.1 %
EXPIRATORY PRESSURE: 8 CM H2O
FIO2: 40 %
FIO2: 40 %
GLUCOSE BLD-MCNC: 141 MG/DL (ref 70–99)
GLUCOSE BLD-MCNC: 146 MG/DL (ref 70–99)
GLUCOSE BLD-MCNC: 147 MG/DL (ref 70–99)
HAV IGM SER QL: 2.1 MG/DL (ref 1.6–2.6)
HCO3 BLDA-SCNC: 17.6 MEQ/L (ref 22–26)
HCO3 BLDA-SCNC: 19.5 MEQ/L (ref 22–26)
HCT VFR BLD AUTO: 29 %
HGB BLD-MCNC: 10.2 G/DL
HGB BLD-MCNC: 10.2 G/DL
HGB BLD-MCNC: 9.6 G/DL
IMM GRANULOCYTES # BLD AUTO: 0.05 X10(3) UL (ref 0–1)
IMM GRANULOCYTES NFR BLD: 0.5 %
INSP PRESSURE: 12 CM H2O
L/M: 10 L/MIN
LYMPHOCYTES # BLD AUTO: 0.92 X10(3) UL (ref 1–4)
LYMPHOCYTES NFR BLD AUTO: 8.4 %
MCH RBC QN AUTO: 32.7 PG (ref 26–34)
MCHC RBC AUTO-ENTMCNC: 35.2 G/DL (ref 31–37)
MCV RBC AUTO: 92.9 FL
METHGB MFR BLD: 0.6 % SAT (ref 0.4–1.5)
METHGB MFR BLD: 0.6 % SAT (ref 0.4–1.5)
MONOCYTES # BLD AUTO: 0.29 X10(3) UL (ref 0.1–1)
MONOCYTES NFR BLD AUTO: 2.7 %
NEUTROPHILS # BLD AUTO: 9.55 X10 (3) UL (ref 1.5–7.7)
NEUTROPHILS # BLD AUTO: 9.55 X10(3) UL (ref 1.5–7.7)
NEUTROPHILS NFR BLD AUTO: 87.7 %
OSMOLALITY SERPL CALC.SUM OF ELEC: 281 MOSM/KG (ref 275–295)
P/F RATIO: 245.5 MMHG
P/F RATIO: 313.9 MMHG
PCO2 BLDA: 27 MM HG (ref 35–45)
PCO2 BLDA: 35 MM HG (ref 35–45)
PH BLDA: 7.37 [PH] (ref 7.35–7.45)
PH BLDA: 7.43 [PH] (ref 7.35–7.45)
PHOSPHATE SERPL-MCNC: 1.5 MG/DL (ref 2.5–4.9)
PLATELET # BLD AUTO: 141 10(3)UL (ref 150–450)
PO2 BLDA: 126 MM HG (ref 80–105)
PO2 BLDA: 98 MM HG (ref 80–105)
POTASSIUM SERPL-SCNC: 3.7 MMOL/L (ref 3.5–5.1)
RBC # BLD AUTO: 3.12 X10(6)UL
SAO2 % BLDA FROM PO2: 97 % (ref 92–100)
SAO2 % BLDA FROM PO2: 99 % (ref 92–100)
SAO2 % BLDA: 96 % (ref 92–100)
SAO2 % BLDA: 98 % (ref 92–100)
SODIUM SERPL-SCNC: 135 MMOL/L (ref 136–145)
VENT RATE: 16 /MIN
WBC # BLD AUTO: 10.9 X10(3) UL (ref 4–11)

## 2021-08-15 PROCEDURE — 99232 SBSQ HOSP IP/OBS MODERATE 35: CPT | Performed by: HOSPITALIST

## 2021-08-15 PROCEDURE — 5A09357 ASSISTANCE WITH RESPIRATORY VENTILATION, LESS THAN 24 CONSECUTIVE HOURS, CONTINUOUS POSITIVE AIRWAY PRESSURE: ICD-10-PCS | Performed by: INTERNAL MEDICINE

## 2021-08-15 RX ORDER — ALBUMIN (HUMAN) 12.5 G/50ML
25 SOLUTION INTRAVENOUS ONCE
Status: COMPLETED | OUTPATIENT
Start: 2021-08-15 | End: 2021-08-15

## 2021-08-15 RX ORDER — IPRATROPIUM BROMIDE AND ALBUTEROL SULFATE 2.5; .5 MG/3ML; MG/3ML
3 SOLUTION RESPIRATORY (INHALATION)
Status: DISCONTINUED | OUTPATIENT
Start: 2021-08-15 | End: 2021-08-25

## 2021-08-15 RX ORDER — MEXILETINE HYDROCHLORIDE 150 MG/1
150 CAPSULE ORAL EVERY 8 HOURS SCHEDULED
Status: DISCONTINUED | OUTPATIENT
Start: 2021-08-15 | End: 2021-08-25

## 2021-08-15 NOTE — PLAN OF CARE
Assumed care 1930. Mechanically ventilated and sedated on Precedex gtt, opens eyes to name, follows commands, nodds/gestures appropriately. Levophed increased to 7 mcg/min overnight. Atrial-paced on monitor with frequent PVCs.   No loose stools/BM Jerica Riser

## 2021-08-15 NOTE — PHYSICAL THERAPY NOTE
Physical Therapy    Order for PT eval received. Pt remains intubated, sedated and requiring pressor support. Will re-attempt eval when pt is medically appropriate to participate in sessions.

## 2021-08-15 NOTE — PROGRESS NOTES
[address] [facility] Patient Status:  Inpatient    1945 MRN [id number]   [facility] 6NE-A Attending Lenora Mata MD   Hosp Day # 3 PCP Rambo Diaz MD     Pulm / Critical Care Progress Note     S: pt remains intubated. rhythm, normal S1S2, no murmur. Abdomen: soft, non-tender, non-distended, positive BS.    Extremity: no edema     Recent Labs   Lab 08/13/21  0419 08/14/21  0424 08/15/21  0558   WBC 13.1* 14.1* 10.9   HGB 10.7* 9.9* 10.2*   HCT 31.9* 29.1* 29.0*   PLT 15

## 2021-08-15 NOTE — SLP NOTE
SLP attempted to see pt for BSSE (pt was extubated today). Nurse stated pt is not appropriate at this time, as pt's O2 level is currently in low 80s. ST will f/u with pt tomorrow, as schedule permits.

## 2021-08-15 NOTE — PROGRESS NOTES
08/15/21 1756   Clinical Encounter Type   Visited With Patient and family together   Routine Visit Introduction   Continue Visiting No   Patient's Supportive Strategies/Resources  provided emotional support   Sacramental Encounters   Sacrament O

## 2021-08-15 NOTE — PROGRESS NOTES
[facility] Cardiology Progress Note    Patient seen and examined.  Chart reviewed.  Discussed with RN    Intubated but awake.       BP (!) 78/58   Pulse 70   Temp 97 °F (36.1 °C) (Temporal)   Resp 15   Ht 70\"   Wt 120 lb 2.4 oz (54.5 kg)   SpO2 100%   BMI 17.24 k Lidocaine HCl (Cardiac) (XYLOCAINE) 20 MG/ML injection 100 mg, 100 mg, Intravenous, Once  [COMPLETED] Amiodarone HCl (CORDARONE) 150 MG/3ML injection 150 mg, 150 mg, Intravenous, Once  [COMPLETED] potassium chloride 40 mEq in sodium chloride 0.9% 250 mL IV Intravenous, Q5 Min PRN  Dexmedetomidine HCl in NaCl (PRECEDEX) 400 MCG/100ML premix infusion, 0.2-1.5 mcg/kg/hr, Intravenous, Continuous  famoTIDine (PEPCID) injection 20 mg, 20 mg, Intravenous, BID  Piperacillin Sod-Tazobactam So (ZOSYN) 3.375 g in dextr

## 2021-08-15 NOTE — PROGRESS NOTES
08/15/21 1000   Spontaneous Parameters   Spontaneous RR Rate 24   Spontaneous Minute Volume 6.7   Average Spontaneous Tidal Volume 279   $ Spontaneous Vital Capacity 390   Negative Inspiratory Force -12   Total RSBI 86

## 2021-08-15 NOTE — PROGRESS NOTES
[facility]  Progress Note     Quique Mckinnon Patient Status:  Inpatient    1945 MRN [id number]   [facility] 6NE-A Attending Dr. Karen Acosta Day # 3 PCP Jason Lux MD     Chief Complaint: Respiratory failure, ICD shock    S in this interval not displayed. Estimated Creatinine Clearance: 95.4 mL/min (A) (based on SCr of 0.5 mg/dL (L)).     Recent Labs   Lab 08/12/21  0105   PTP 13.2   INR 0.98            COVID-19 Lab Results    COVID-19  Lab Results   Component Value Date RN.    Nia Branham MD

## 2021-08-15 NOTE — OCCUPATIONAL THERAPY NOTE
OT evaluation order received, chart reviewed. Pt remains intubated, sedated and requiring pressor support. Will re-attempt when patient is medically appropriate to participate in OT evaluation.

## 2021-08-16 ENCOUNTER — APPOINTMENT (OUTPATIENT)
Dept: GENERAL RADIOLOGY | Facility: HOSPITAL | Age: 76
DRG: 871 | End: 2021-08-16
Attending: INTERNAL MEDICINE
Payer: MEDICARE

## 2021-08-16 LAB
ANION GAP SERPL CALC-SCNC: 5 MMOL/L (ref 0–18)
ARTERIAL PATENCY WRIST A: POSITIVE
ATRIAL RATE: 112 BPM
BASE EXCESS BLDA CALC-SCNC: -2.9 MMOL/L (ref ?–2)
BASOPHILS # BLD AUTO: 0.01 X10(3) UL (ref 0–0.2)
BASOPHILS NFR BLD AUTO: 0.1 %
BODY TEMPERATURE: 97.3 F
BUN BLD-MCNC: 6 MG/DL (ref 7–18)
CALCIUM BLD-MCNC: 7.7 MG/DL (ref 8.5–10.1)
CHLORIDE SERPL-SCNC: 107 MMOL/L (ref 98–112)
CO2 SERPL-SCNC: 25 MMOL/L (ref 21–32)
COHGB MFR BLD: 0.8 % SAT (ref 0–3)
CREAT BLD-MCNC: 0.41 MG/DL
EOSINOPHIL # BLD AUTO: 0.08 X10(3) UL (ref 0–0.7)
EOSINOPHIL NFR BLD AUTO: 0.9 %
ERYTHROCYTE [DISTWIDTH] IN BLOOD BY AUTOMATED COUNT: 16 %
EXPIRATORY PRESSURE: 8 CM H2O
FIO2: 40 %
GLUCOSE BLD-MCNC: 125 MG/DL (ref 70–99)
HAV IGM SER QL: 2.1 MG/DL (ref 1.6–2.6)
HCO3 BLDA-SCNC: 21.4 MEQ/L (ref 22–26)
HCT VFR BLD AUTO: 27.7 %
HGB BLD-MCNC: 8.7 G/DL
HGB BLD-MCNC: 9.4 G/DL
IMM GRANULOCYTES # BLD AUTO: 0.07 X10(3) UL (ref 0–1)
IMM GRANULOCYTES NFR BLD: 0.8 %
INSP PRESSURE: 12 CM H2O
LYMPHOCYTES # BLD AUTO: 0.84 X10(3) UL (ref 1–4)
LYMPHOCYTES NFR BLD AUTO: 9.8 %
MCH RBC QN AUTO: 32.1 PG (ref 26–34)
MCHC RBC AUTO-ENTMCNC: 33.9 G/DL (ref 31–37)
MCV RBC AUTO: 94.5 FL
METHGB MFR BLD: 0.5 % SAT (ref 0.4–1.5)
MONOCYTES # BLD AUTO: 0.42 X10(3) UL (ref 0.1–1)
MONOCYTES NFR BLD AUTO: 4.9 %
NEUTROPHILS # BLD AUTO: 7.13 X10 (3) UL (ref 1.5–7.7)
NEUTROPHILS # BLD AUTO: 7.13 X10(3) UL (ref 1.5–7.7)
NEUTROPHILS NFR BLD AUTO: 83.5 %
OSMOLALITY SERPL CALC.SUM OF ELEC: 283 MOSM/KG (ref 275–295)
P/F RATIO: 486.9 MMHG
PCO2 BLDA: 34 MM HG (ref 35–45)
PH BLDA: 7.42 [PH] (ref 7.35–7.45)
PHOSPHATE SERPL-MCNC: 2.3 MG/DL (ref 2.5–4.9)
PLATELET # BLD AUTO: 127 10(3)UL (ref 150–450)
PO2 BLDA: 191 MM HG (ref 80–105)
POTASSIUM SERPL-SCNC: 2.8 MMOL/L (ref 3.5–5.1)
POTASSIUM SERPL-SCNC: 2.8 MMOL/L (ref 3.5–5.1)
Q-T INTERVAL: 486 MS
QRS DURATION: 200 MS
QTC CALCULATION (BEZET): 663 MS
R AXIS: 139 DEGREES
RBC # BLD AUTO: 2.93 X10(6)UL
SAO2 % BLDA FROM PO2: 100 % (ref 92–100)
SAO2 % BLDA: 98 % (ref 92–100)
SODIUM SERPL-SCNC: 137 MMOL/L (ref 136–145)
T AXIS: -30 DEGREES
VENTRICULAR RATE: 112 BPM
WBC # BLD AUTO: 8.6 X10(3) UL (ref 4–11)

## 2021-08-16 PROCEDURE — 99233 SBSQ HOSP IP/OBS HIGH 50: CPT | Performed by: HOSPITALIST

## 2021-08-16 PROCEDURE — 71045 X-RAY EXAM CHEST 1 VIEW: CPT | Performed by: INTERNAL MEDICINE

## 2021-08-16 RX ORDER — POTASSIUM CHLORIDE 1.5 G/1.77G
40 POWDER, FOR SOLUTION ORAL DAILY
Status: DISCONTINUED | OUTPATIENT
Start: 2021-08-17 | End: 2021-08-17

## 2021-08-16 RX ORDER — POTASSIUM CHLORIDE 29.8 MG/ML
40 INJECTION INTRAVENOUS EVERY 4 HOURS
Status: COMPLETED | OUTPATIENT
Start: 2021-08-16 | End: 2021-08-17

## 2021-08-16 RX ORDER — ALBUMIN (HUMAN) 12.5 G/50ML
25 SOLUTION INTRAVENOUS ONCE
Status: COMPLETED | OUTPATIENT
Start: 2021-08-16 | End: 2021-08-16

## 2021-08-16 NOTE — PLAN OF CARE
Assumed care of patient at 0730. Received pt sedated on precedex gtt on ventilator. Able follow commands and nods head appropriately. Pt placed on tpiece per pulm, abd completed per pulm pt to be extubated. Extubated at [address] to [address].  Pt able tell me his name

## 2021-08-16 NOTE — DIETARY NOTE
[facility]    NUTRITION ASSESSMENT    Pt meets severe malnutrition criteria.     CRITERIA FOR MALNUTRITION DIAGNOSIS:  Criteria for severe malnutrition diagnosis: acute illness/injury related to wt loss greater than 7.5% in 3 months, energy intake less (122 lb 12.7 oz). This is 66% of IBW  BMI: Body mass index is 17.62 kg/m².   IBW: 75.5 kg      WEIGHT HISTORY:   Patient Weight(s) for the past 336 hrs:   Weight   08/16/21 0400 55.7 kg (122 lb 12.7 oz)   08/15/21 0239 54.5 kg (120 lb 2.4 oz)   08/14/21 040

## 2021-08-16 NOTE — PROGRESS NOTES
[facility]     Cardiology Progress Note        [facility] Patient Status:  Inpatient    1945 MRN [id number]   [facility] 6NE-A Attending Susana Easley MD   Hosp Day # 4 PCP Rao Gibbs MD       Subjective:  Awake, re enoxaparin  40 mg Subcutaneous Daily   • Chlorhexidine Gluconate  15 mL Mouth/Throat [email or web address]   • famoTIDine  20 mg Intravenous BID   • piperacillin-tazobactam  3.375 g Intravenous Q8H     • fentanyl     • dexmedetomidine 0.2 mcg/kg/hr (08/16/21 1200)

## 2021-08-16 NOTE — PLAN OF CARE
I called dtr LETA. She reports family is taking a mental break today. They plan to get back to us tomorrow on whether they wish to pursue g-tube. She agrees to call when she arrives to tomorrow so we can discuss risks involved.       Wil Strauss [place]  4:

## 2021-08-16 NOTE — OCCUPATIONAL THERAPY NOTE
Received order for OT evaluation. Pt is using Bipap. Pressors. OT will initiate evaluation once the patient is medically stable. Spoke with RN.

## 2021-08-16 NOTE — PROGRESS NOTES
08/16/21 1346   Clinical Encounter Type   Visited With Patient   Continue Visiting Yes   Restorationism Encounters   Restorationism Needs Prayer   Patient Spiritual Encounters   Spiritual Needs Daily prayer provided by /patient responsive though quiet.

## 2021-08-16 NOTE — PLAN OF CARE
Assumed care of patient at 0730. Patient alert but drowsy. Nods approprietly and gestures for needs. A-paced on monitor. Weaning levo as tolerated. Lungs diminished. ABG and Chest x-ray done. Patient placed on NC. Tolerating well.  Ng w/ TF at goal. Minimal to report SOB or any respiratory difficulty  - Respiratory Therapy support as indicated  - Manage/alleviate anxiety  - Monitor for signs/symptoms of CO2 retention  Outcome: Progressing     Problem: GENITOURINARY - ADULT  Goal: Absence of urinary retention

## 2021-08-16 NOTE — PROGRESS NOTES
[facility]  Progress Note     Mis Learyky Patient Status:  Inpatient    1945 MRN [id number]   [facility] 6NE-A Attending Dr. Amari Pedersen Day # 4 PCP Tori Snell MD     Chief Complaint: Respiratory failure, ICD shock    S --   --   --    AST 20  --   --   --   --   --   --    ALT 15*  --   --   --   --   --   --    BILT 0.8  --   --   --   --   --   --    TP 7.1  --   --   --   --   --   --     < > = values in this interval not displayed.        Estimated Creatinine Clearanc pneumonitis after emesis  1. BIPAP per pulm, Zosyn IV, ABG reviewed  6. Colitis, CDiff negative  1. Zosyn, monitor stools, imodium  7. Abnormal thyroid function d/t amiodarone? TSH normal, FT4 high FT3 low  1. Repeat in 4 weeks  8.  Cerebrovascular disease

## 2021-08-16 NOTE — CM/SW NOTE
CM reviewed chart. Patient was extubated but due to increased work of breathing, remains on BiPap with pressure support. On Precedex and Levophed. PT/OT to eval when more medically stable.  CM will follow along for appropriate time to assess/discuss DC need

## 2021-08-16 NOTE — PROGRESS NOTES
[address] [facility] Patient Status:  Inpatient    1945 MRN [id number]   [facility] 6NE-A Attending Marlene Ji MD   [facility] Day # 4 PCP Amena Sifuentes MD     Critical Care Progress Note     Date of Admission: 2021 12 1000 mcg/100 ml NS premix infusion,  mcg/hr, Intravenous, Continuous PRN  •  PEG 3350 (MIRALAX) powder packet 17 g, 17 g, Oral, Daily PRN  •  magnesium hydroxide (MILK OF MAGNESIA) 400 MG/5ML suspension 30 mL, 30 mL, Oral, Daily PRN  •  bisacodyl (DU cyanosis. no edema                          Skin: No rashes or lesions.     Lab Results   Component Value Date    WBC 8.6 08/16/2021    RBC 2.93 08/16/2021    HGB 9.4 08/16/2021    HCT 27.7 08/16/2021    MCV 94.5 08/16/2021    MCH 32.1 08/16/2021    MCHC 33

## 2021-08-16 NOTE — PLAN OF CARE
Several loose stools overnight. Liquid brown and reed texture. Barrier paste applied, no breakdown, but some redness noted. BiPAP ATC. Tolerated oral care, but refused it at times. No arrhythmias overnight.

## 2021-08-16 NOTE — SLP NOTE
Order received, note patient on BiPAP and not appropriate for evaluation at this time. Will hold today and follow peripherally, completing evaluation as clinically appropriate.     Negar Peck Mathieu 86 CCC-SLP  Pager [phone]

## 2021-08-16 NOTE — PROGRESS NOTES
[facility]  Progress Note     Quique Mckinnon Patient Status:  Inpatient    1945 MRN [id number]   [facility] 6NE-A Attending Dr. Karen Acosta Day # 4 PCP Jason Lux MD     Chief Complaint: Respiratory failure, ICD shock    S 0.8  --   --   --   --   --   --    TP 7.1  --   --   --   --   --   --     < > = values in this interval not displayed. Estimated Creatinine Clearance: 118.9 mL/min (A) (based on SCr of 0.41 mg/dL (L)). Recent Labs   Lab 08/12/21  0105   PTP 13. 2 FT4 high FT3 low  1. Repeat in 4 weeks  8. Cerebrovascular disease  9. Severe malnutrition  10.  Memory impairment, nondecisional    Quality:  · DVT Prophylaxis: Lovenox  · CODE status: Full code  · Jaeger: Yes   · If COVID testing is negative, may discontin

## 2021-08-16 NOTE — PHYSICAL THERAPY NOTE
Pt extubated but requiring BIPAP and continues to require pressor support. Will continue to follow peripherally until medically appropriate to initiate physical therapy evaluation.

## 2021-08-17 ENCOUNTER — APPOINTMENT (OUTPATIENT)
Dept: GENERAL RADIOLOGY | Facility: HOSPITAL | Age: 76
DRG: 871 | End: 2021-08-17
Attending: INTERNAL MEDICINE
Payer: MEDICARE

## 2021-08-17 LAB
ANION GAP SERPL CALC-SCNC: <0 MMOL/L (ref 0–18)
ATRIAL RATE: 375 BPM
BUN BLD-MCNC: 3 MG/DL (ref 7–18)
CALCIUM BLD-MCNC: 8 MG/DL (ref 8.5–10.1)
CHLORIDE SERPL-SCNC: 111 MMOL/L (ref 98–112)
CO2 SERPL-SCNC: 28 MMOL/L (ref 21–32)
CREAT BLD-MCNC: 0.43 MG/DL
GLUCOSE BLD-MCNC: 96 MG/DL (ref 70–99)
OSMOLALITY SERPL CALC.SUM OF ELEC: 280 MOSM/KG (ref 275–295)
P AXIS: -10 DEGREES
P-R INTERVAL: 266 MS
PHOSPHATE SERPL-MCNC: 1.1 MG/DL (ref 2.5–4.9)
POTASSIUM SERPL-SCNC: 4.9 MMOL/L (ref 3.5–5.1)
Q-T INTERVAL: 444 MS
QRS DURATION: 120 MS
QTC CALCULATION (BEZET): 479 MS
R AXIS: 26 DEGREES
SODIUM SERPL-SCNC: 137 MMOL/L (ref 136–145)
T AXIS: 98 DEGREES
VENTRICULAR RATE: 70 BPM

## 2021-08-17 PROCEDURE — 99232 SBSQ HOSP IP/OBS MODERATE 35: CPT | Performed by: HOSPITALIST

## 2021-08-17 PROCEDURE — 71045 X-RAY EXAM CHEST 1 VIEW: CPT | Performed by: INTERNAL MEDICINE

## 2021-08-17 RX ORDER — CARVEDILOL 3.12 MG/1
3.12 TABLET ORAL 2 TIMES DAILY WITH MEALS
Status: DISCONTINUED | OUTPATIENT
Start: 2021-08-17 | End: 2021-08-20

## 2021-08-17 RX ORDER — POTASSIUM CHLORIDE 1.5 G/1.77G
40 POWDER, FOR SOLUTION ORAL 2 TIMES DAILY
Status: DISCONTINUED | OUTPATIENT
Start: 2021-08-18 | End: 2021-08-19

## 2021-08-17 NOTE — PROGRESS NOTES
[facility]     Cardiology Progress Note        Mis Benitez Patient Status:  Inpatient    1945 MRN [id number]   [facility] 6NE-A Attending Cora Johnson MD   Hosp Day # 5 PCP Tori Snell MD       Subjective:  Awake, tr 08/17/2021     08/17/2021    CO2 28.0 08/17/2021    GLU 96 08/17/2021    CA 8.0 08/17/2021    PHOS 1.1 08/17/2021           • sodium phosphate  30 mmol Intravenous Once   • potassium chloride  40 mEq Oral Daily   • ipratropium-albuterol  3 mL Nebuliz

## 2021-08-17 NOTE — CM/SW NOTE
Care Progression Note:  Active Acute Medical Issue:   Ventricular tachycardia (paroxysmal) (HCC)   Respiratory failure d/t likely aspiration pna, CHF  Pt was weaned off levophed this am, transitioned off bipap to NC now    Other Contributing Medical Factor

## 2021-08-17 NOTE — PHYSICAL THERAPY NOTE
PHYSICAL THERAPY EVALUATION - INPATIENT     Room Number: 4135/7649-X  Evaluation Date: 8/17/2021  Type of Evaluation: Initial  Physician Order: PT Eval and Treat    Presenting Problem: Ventricular Tachycardia  Reason for Therapy: Mobility Dysfunction and standard wheelchair, hospital bed)  Patient Regularly Uses: Home O2 (does not regularly use)    Prior Level of Bally: The pt is unable to provide any history. Called and spoke with pt's dtr, LETA, regarding pt's PLOF.   LETA reports that up until 2 week promotion;Repositioning    COGNITION  · Arousal/Alertness:  appropriate responses to stimuli and inconsistent responses to stimuli  · Attention Span:  attends with cues to redirect  · Following Commands:  follows one-step commands inconsistently    RANGE O tested  Dynamic Standing: Not tested         ACTIVITY TOLERANCE  Pulse: 70                      O2 WALK  Oxygen Therapy  SPO2% on Room Air at Rest: 96    AM-PAC '6-Clicks' INPATIENT SHORT FORM - BASIC MOBILITY  How much difficulty does the patient currentl reach;RN aware of session/findings; All patient questions and concerns addressed; In bed    ASSESSMENT   Patient is a 67year old male admitted on 8/12/2021 for ventricular tachycardia.   Pertinent comorbidities and personal factors impacting therapy include Goal #3 Patient is able to roll bilaterally at assistance level: maximum assistance     Goal #4 Pt will tolerate 10-20 reps of BLE A/AA/PROM through all planes.    Goal #5    Goal #6    Goal Comments: Goals established on 8/17/2021

## 2021-08-17 NOTE — OCCUPATIONAL THERAPY NOTE
OCCUPATIONAL THERAPY QUICK EVALUATION - INPATIENT    Room Number: 8876/7346-L  Evaluation Date: 8/17/2021     Type of Evaluation: Quick Eval  Presenting Problem: ventricular tachycardia, cardiogenic shock    Physician Order: IP Consult to Occupational Ther Drives: No  Patient Regularly Uses: Home O2 (does not regularly use)    Prior Level of Function: Pt lives with his daughter. PT spoke with daughter on the phone. Per PT note 8/17, \"The pt is unable to provide any history.  Called and spoke with pt's d drying)?: A Lot  -   Toileting, which includes using toilet, bedpan or urinal? : A Lot  -   Putting on and taking off regular upper body clothing?: A Little  -   Taking care of personal grooming such as brushing teeth?: A Little  -   Eating meals?: A Littl comorbidities nor modifications of tasks    Clinical Decision Making  LOW - Analysis of occupational profile, problem-focused assessments, limited treatment options    Overall Complexity  LOW     OT Discharge Recommendations: 24 hour care/supervision

## 2021-08-17 NOTE — PLAN OF CARE
Assumed care of pt at 0730. Pt A&Ox4. Apaced on tele. Lung sounds diminished bilaterally on room air. Pt denies pain and SOB. Jaeger cath removed at 1000 and voiding trial started. Pt incontinent of bowel and bladder, having loose, seedy stool.  Pt declines ordered  Outcome: Progressing     Problem: RESPIRATORY - ADULT  Goal: Achieves optimal ventilation and oxygenation  Description: INTERVENTIONS:  - Assess for changes in respiratory status  - Assess for changes in mentation and behavior  - Position to facil gravity, serum osmolarity and serum sodium as indicated or ordered  - Monitor response to interventions for patient's volume status, including labs, urine output, blood pressure (other measures as available)  - Encourage oral intake as appropriate  - Instr

## 2021-08-17 NOTE — PROGRESS NOTES
[address] [facility] Patient Status:  Inpatient    1945 MRN [id number]   [facility] 6NE-A Attending Cora Johnson MD   [facility] Day # 5 PCP Tori Snell MD     Critical Care Progress Note     Date of Admission: 2021 12 Chlorhexidine Gluconate (PERIDEX) 0.12 % solution 15 mL, 15 mL, Mouth/Throat, [email or web address]  •  Midazolam HCl (VERSED) 2 MG/2ML injection 2 mg, 2 mg, Intravenous, Q5 Min PRN  •  Dexmedetomidine HCl in NaCl (PRECEDEX) 400 MCG/100ML premix infusion, 0.2-1.5 Imaging: I have independently visualized all relevant chest imaging in PACS. I agree with the radiology interpretation except where noted. ASSESSMENT/PLAN:  1.  Acute hypoxic resp failure- due to AICD discharge and +/- aspiration event. In setti

## 2021-08-17 NOTE — PROGRESS NOTES
Assume care of patient at 0730. Patient alert, nods and gestures approprietly. Lungs very diminished, congested cough, needs suctioning but refusing. A-paced on monitor. sbp normotensive. Advancing TF as tolerated. Hyperactive bowel sounds.  Continues to ha

## 2021-08-17 NOTE — PROGRESS NOTES
08/17/21 1552   Clinical Encounter Type   Visited With Health care provider  (ERWIN Jones said meeting tomorrow between family and doctor.)   Routine Visit Follow-up   Continue Visiting Yes   Yarsanism Encounters   Yarsanism Needs Prayer   Patient Tim Alexandre

## 2021-08-17 NOTE — PLAN OF CARE
Consistently refusing oral care and cough/suction assist. Weak, wet cough, non-productive, unable to expel secretions. Despite this, weaned to room, respirations calm and regular. Loose stools persist. Two PRN doses of imodium given.

## 2021-08-17 NOTE — PROGRESS NOTES
[facility]  Progress Note     Blanquita Castanon Patient Status:  Inpatient    1945 MRN [id number]   [facility] 6NE-A Attending Dr. Elidia Chacko Day # 5 PCP Florence Castillo MD     Chief Complaint: Respiratory failure, ICD shock    S 7.6*  --  7.7*  --  8.0*   ALB 3.0*  --  2.0*  --   --   --   --   --   --       < > 137  --  135*  --  137  --  137   K 2.1*   < > 4.0   < > 3.7   < > 2.8* 2.8* 4.9      < > 108  --  106  --  107  --  111   CO2 26.0   < > 24.0  --  21.0  --  2 today, follow tele  2. Amio/mexiletine   3. EP following, device interrogated   3. Elevated troponin d/t demand ischemia  4. 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 Cardiology consult   5.  Acute hypoxic respiratory failure d/t possible aspira 147*  --  125*  --  96   BUN 7   < > 15  --  8  --  6*  --  3*   CREATSERUM 0.77   < > 0.93  --  0.50*  --  0.41*  --  0.43*   GFRAA 101   < > 91  --  121  --  131  --  129   GFRNAA 88   < > 79  --  105  --  113  --  111   CA 8.9   < > 7.6*  --  7.6*  --

## 2021-08-17 NOTE — SLP NOTE
Attempted evaluation, patient alert and up in bed but adamantly refused to accept any po. He did report eating at home but reported poor intake. SLP will continue to follow and complete his assessment as he is agreeable. Discussed with PA.       Delaney

## 2021-08-18 LAB
HAV IGM SER QL: 1.9 MG/DL (ref 1.6–2.6)
PHOSPHATE SERPL-MCNC: 1.4 MG/DL (ref 2.5–4.9)
PLATELET # BLD AUTO: 190 10(3)UL (ref 150–450)
POTASSIUM SERPL-SCNC: 3.4 MMOL/L (ref 3.5–5.1)

## 2021-08-18 PROCEDURE — 99232 SBSQ HOSP IP/OBS MODERATE 35: CPT | Performed by: HOSPITALIST

## 2021-08-18 NOTE — PHYSICAL THERAPY NOTE
Patient presented semi-supine in bed; VSS and agreeable to participate. Performed BUE/BLE AAROM/PROM regimen in all planes as directed by PT/OT. Upon completion, patient left in bed with call light in reach. Patient family present throughout session.

## 2021-08-18 NOTE — PROGRESS NOTES
Pulmonary Progress Note     Assessment / Plan:  1. Acute hypoxic resp failure - due to AICD discharge and +/- aspiration event. In setting of severe CHF. Extubated 8/15  - on RA  2. Aspiration pneumonia  - seven day course of zosyn completes today  3.  CAD

## 2021-08-18 NOTE — PLAN OF CARE
dtr notified of pt/MD discussion this am - pt declined g-tube. She reports her brother is coming in from [place] tomorrow and she wants him to be a part of the final decision.   She plans to talk with her father when she gets in today and then will pa

## 2021-08-18 NOTE — PROGRESS NOTES
08/18/21 1612   Clinical Encounter Type   Visited With Patient and family together;Health care provider   Routine Visit Follow-up   Continue Visiting No   Patient Spiritual Encounters   Spiritual Interventions  provided spirtual through caring p

## 2021-08-18 NOTE — SLP NOTE
ADULT SWALLOWING EVALUATION    ASSESSMENT    ASSESSMENT/OVERALL IMPRESSION:  Patient seen for swallowing evaluation due to concern for aspiration given history of dysphagia and recent intubation from 8/12/21 to 8/15/21.   Patient admitted due to multiple AI trials via modes noted above.       Given history of dysphagia and current clinical presentation, patient would benefit from VFSS to further objectively outline oral and pharyngeal swallow function to determine aspiration risk and allow for determination of aspiration    SWALLOWING HISTORY  Current Diet Consistency: NPO  Dysphagia History: as above  Imaging Results:   CXR from 8/17/21 revealed:  CONCLUSION:  Nasogastric tube tip is in left upper quadrant in region of gastric body.       Interval increase in i Goals: 1  Follow Up Needed (Documentation Required): Yes  SLP Follow-up Date: 08/19/21    Thank you for your referral.   If you have any questions, please contact Lila Varma   Pager [phone]

## 2021-08-18 NOTE — PLAN OF CARE
Assumed care for pt at 19:30 on 8/17    Alert pt, weak voice/nonverbal. Nods appropriately. VSS on RA, A paced on tele. Refused oral suction/oral care. Lungs diminished. NG secured at 57\", continuous TF increased to 60ml/hr at 01:00. Loose BM, cleansed.  Mike Hernandes

## 2021-08-18 NOTE — PROGRESS NOTES
Subjective: In bed on exam, sleeping.       Objective:   /60 (BP Location: Left arm)   Pulse 70   Temp 97.7 °F (36.5 °C) (Axillary)   Resp 18   Ht 5' 10\" (1.778 m)   Wt 127 lb (57.6 kg)   SpO2 95%   BMI 18.22 kg/m²     Temp (24hrs), Av.3 edema.       Labs:  Lab Results   Component Value Date    .0 08/18/2021    K 3.4 08/18/2021    MG 1.9 08/18/2021    PHOS 1.4 08/18/2021       Lab Results   Component Value Date    INR 0.98 08/12/2021    INR 0.98 05/20/2021    INR 1.01 12/26/2020 Assessment:  1. Recurrent vt with multiple ICD shocks in setting of hypokalemia- no recurrents. 2. End stage ICM without revascularization options- ef 15-20%  3. Acute hypoxic resp failure- extubated Sunday. Stable on nc  4. Anemia - Hgb stable 9.4  5.  H

## 2021-08-18 NOTE — PROGRESS NOTES
[facility]  Progress Note     Luz Elena Sunshine Patient Status:  Inpatient    1945 MRN [id number]   [facility] 6NE-A Attending Dr. Rita Blank Day # 6 PCP Portia Angel MD     Chief Complaint: Respiratory failure, ICD shock    S 7.6*  --  7.6*  --  7.7*  --   --  8.0*  --    ALB 3.0*  --  2.0*  --   --   --   --   --   --   --   --       < > 137  --  135*  --  137  --   --  137  --    K 2.1*   < > 4.0   < > 3.7   < > 2.8*   < > 2.8* 4.9 3.4*      < > 108  --  106  -- - near completion  2. Monitor hemodynamics  3. Telemetry   2. Hypokalemia > recurrent VT > ICD shock  1. Ongoing electrolyte replacement daily  2. Amio/mexiletine   3. EP following, device interrogated   3. Elevated troponin d/t demand ischemia  4.  Acute o --    MCH 31.7  --  32.7 32.1  --    MCHC 34.0  --  35.2 33.9  --    RDW 15.9  --  16.1 16.0  --    NEPRELIM 12.43*  --  9.55* 7.13  --    WBC 14.1*  --  10.9 8.6  --    .0*   < > 141.0* 127.0* 190.0    < > = values in this interval not displayed.

## 2021-08-18 NOTE — PLAN OF CARE
Assumed care of pt at 0730. Pt A&Ox2, nonverbal, but nods appropriately and mouths to staff. Apaced on tele. Crackles auscultated bilateral lungs, room air. Pt denies pain. Declines to get up to chair with total lift with staff.  Incontinent of bowel and bl Achieves optimal ventilation and oxygenation  Description: INTERVENTIONS:  - Assess for changes in respiratory status  - Assess for changes in mentation and behavior  - Position to facilitate oxygenation and minimize respiratory effort  - Oxygen supplement emptying  8/18/2021 1504 by Walter Tariq RN  Outcome: Progressing  8/18/2021 1504 by Walter Tariq RN  Outcome: Progressing     Problem: METABOLIC/FLUID AND ELECTROLYTES - ADULT  Goal: Electrolytes maintained within normal limits  Description: I See additional Care Plan goals for specific interventions  8/18/2021 1504 by Alexander Dave RN  Outcome: Progressing  8/18/2021 1504 by Alexander Dave RN  Outcome: Progressing

## 2021-08-19 ENCOUNTER — APPOINTMENT (OUTPATIENT)
Dept: GENERAL RADIOLOGY | Facility: HOSPITAL | Age: 76
DRG: 871 | End: 2021-08-19
Attending: HOSPITALIST
Payer: MEDICARE

## 2021-08-19 LAB
ANION GAP SERPL CALC-SCNC: 6 MMOL/L (ref 0–18)
BUN BLD-MCNC: 5 MG/DL (ref 7–18)
CALCIUM BLD-MCNC: 6.2 MG/DL (ref 8.5–10.1)
CHLORIDE SERPL-SCNC: 115 MMOL/L (ref 98–112)
CO2 SERPL-SCNC: 22 MMOL/L (ref 21–32)
CREAT BLD-MCNC: 0.27 MG/DL
GLUCOSE BLD-MCNC: 112 MG/DL (ref 70–99)
GLUCOSE BLD-MCNC: 99 MG/DL (ref 70–99)
HAV IGM SER QL: 1.4 MG/DL (ref 1.6–2.6)
OSMOLALITY SERPL CALC.SUM OF ELEC: 293 MOSM/KG (ref 275–295)
PHOSPHATE SERPL-MCNC: 1.2 MG/DL (ref 2.5–4.9)
POTASSIUM SERPL-SCNC: 2.7 MMOL/L (ref 3.5–5.1)
POTASSIUM SERPL-SCNC: 3.6 MMOL/L (ref 3.5–5.1)
SODIUM SERPL-SCNC: 143 MMOL/L (ref 136–145)

## 2021-08-19 PROCEDURE — 74230 X-RAY XM SWLNG FUNCJ C+: CPT | Performed by: HOSPITALIST

## 2021-08-19 PROCEDURE — 99231 SBSQ HOSP IP/OBS SF/LOW 25: CPT | Performed by: HOSPITALIST

## 2021-08-19 RX ORDER — POTASSIUM CHLORIDE 1.5 G/1.77G
40 POWDER, FOR SOLUTION ORAL 3 TIMES DAILY
Status: DISCONTINUED | OUTPATIENT
Start: 2021-08-19 | End: 2021-08-23

## 2021-08-19 RX ORDER — MAGNESIUM OXIDE 400 MG (241.3 MG MAGNESIUM) TABLET
400 TABLET 2 TIMES DAILY WITH MEALS
Status: COMPLETED | OUTPATIENT
Start: 2021-08-19 | End: 2021-08-20

## 2021-08-19 NOTE — PLAN OF CARE
Assumed care of pt at 0730. Pt A&Ox2-3, oriented to self and place. Pt nonverbal, but can nod and mouth appropriately. Apaced on tele. Crackles auscultated bilateral lungs on room air. Pt denies pain and SOB. Q2H turn. Daily bladder scans.  Skin breakdown p Progressing     Problem: RESPIRATORY - ADULT  Goal: Achieves optimal ventilation and oxygenation  Description: INTERVENTIONS:  - Assess for changes in respiratory status  - Assess for changes in mentation and behavior  - Position to facilitate oxygenation Monitor labs and rhythm and assess patient for signs and symptoms of electrolyte imbalances  - Administer electrolyte replacement as ordered  - Monitor response to electrolyte replacements, including rhythm and repeat lab results as appropriate  - Fluid re

## 2021-08-19 NOTE — VIDEO SWALLOW STUDY NOTE
ADULT VIDEOFLUOROSCOPIC SWALLOWING STUDY    Admission Date: 8/12/2021  Evaluation Date: 08/19/21  Radiologist: Benjamin Dodd    RECOMMENDATIONS   Diet Recommendations - Solids: Mechanical soft chopped/ Soft & Bite Sized  Diet Recommendations - Liquids: Nectar thic represent worsening atelectasis, multifocal pneumonia or edema.               Dictated by (CST): Kirstie Costello MD on 8/17/2021 at 1:42 PM       Finalized by (CST): Kirstie Costello MD on 8/17/2021 at 1:45 PM       Reason for Referral:  (history of dysphagia, Phase of Swallow (VFSS - Hard Solid): Impaired  Bolus Propulsion (VFSS - Hard Solid): Impaired  Mastication (VFSS - Hard Solid):  Impaired  Triggered at: Valleculae  Residue Severity, Location: Valleculae;Pyriform sinuses;Aryepiglottic folds  Cleared/Reduce throughout my time with him today. Efficiency of oropharyngeal swallow is also reduced and nutrition and hydration should be monitored to ensure it is adequate.  Patient difficult to assess at bedside d/t presence of wet cough throughout despite lack of pen

## 2021-08-19 NOTE — PROGRESS NOTES
[facility]  Progress Note     Yomi Matos Patient Status:  Inpatient    1945 MRN [id number]   [facility] 6NE-A Attending Dr. Tiana Bernsteinocent Day # 7 PCP Flex Francisco MD     Chief Complaint: Respiratory failure, ICD shock    S SCr of 0.27 mg/dL (L)). No results for input(s): PTP, INR in the last 168 hours.          COVID-19 Lab Results    COVID-19  Lab Results   Component Value Date    COVID19 Not Detected 08/12/2021    COVID19 Not Detected 05/20/2021    COVID19 Not Detected 0 monitor  12. Thrombocytopenia, improved  13. Memory impairment, nondecisional  14. Urinary retention, improving  1.  Watch bladder scan    Quality:  · DVT Prophylaxis: Lovenox  · CODE status: Full code  · Jaeger: no  · If COVID testing is negative, may disco

## 2021-08-19 NOTE — DIETARY NOTE
[facility]    NUTRITION ASSESSMENT    Pt meets severe malnutrition criteria.     CRITERIA FOR MALNUTRITION DIAGNOSIS:  Criteria for severe malnutrition diagnosis: acute illness/injury related to wt loss greater than 7.5% in 3 months, energy intake less as above when ready to begin TF    ANTHROPOMETRICS:  Ht: 177.8 cm (5' 10\")  Wt: 57.6 kg (127 lb). This is 66% of IBW  BMI: Body mass index is 18.22 kg/m².   IBW: 75.5 kg    WEIGHT HISTORY:   Patient Weight(s) for the past 336 hrs:   Weight   08/17/21 1223 VLAD, MARQUITA  Clinical Dietitian  Pager - [phone]

## 2021-08-19 NOTE — PLAN OF CARE
Assumed care for pt at 19:30 on 8/18    A&Ox2, weak voice/nonverbal. No pain behaviors observed. VSS on RA, A paced on tele. NG secured at 57, Mastisol liquid adhesive applied to nose bandage. Vital AF @ 60/hr, Lungs clear, congested non productive cough. Infectious Diseases Inpatient Consult Note      Reason for Consult:  COVID-19 Pneumonia    Requesting Physician:  Kyle Andrade     Primary Care Physician:  Tracy Jackson MD      CHIEF COMPLAINT:     Chief Complaint   Patient presents with    Shortness of Breath     Pt in via EMS with fever and shortness of breath that \"worsened tonight\". EMS reports that pt had \"oxygen saturation of 83% on their arrival\". Pt reports being on \"2 L while at home\". Pt presents to ED on 4 L nasal cannula. Pt alert and oriented on arrival with no signs of distress noted. HISTORY OF PRESENT ILLNESS:  79 y. o. woman with  Ho COPD on home Oxygen 2 lts and CAD, HTN,CVA, CHF history admitted with acute sob + hypoxic to 83% and required 4lts nasal cannula per HPI. She has cough with sob and ongoing for x 3 days has been using her Nebulizer more often. She is followed by  at [facility]. On admit WBC NORMAL and Procal mild elevation, COVID 19 Tested +ve on 10/21. She has a fever 101.3 and now able to wean some on Fio2 to 2 lts. CT chest with multifocal changes consistent with COVID-19 infection  Location : cough with sob and some chest pain       Quality :  Aching pain with deep breaths     Severity : 4/10   Duration : from 2 days      Timing : intermittent   Context :   Fevers,cough and sob   Modifying factors : better with Nebulization   Associated signs and symptoms: sob, cough and fevers, blood cx in process we are consulted for recommendations.       Past Medical History:    Past Medical History:   Diagnosis Date    Arthritis     [facility]     Heart disease     Hypertension     Lung disease     Renal insufficiency     Stroke ([facility] 2012       Past Surgical History:    Past Surgical History:   Procedure Laterality Date    CATARACT REMOVAL      CHOLECYSTECTOMY         Current Medications:    Outpatient Medications Marked as Taking for the 10/20/20 encounter [facility] HOSPITAL Encounter)   Medication Sig Dispense Refill    allopurinol (ZYLOPRIM) 300 MG tablet Take 1 tablet by mouth daily 90 tablet 1    oxyCODONE-acetaminophen (PERCOCET) 7.5-325 MG per tablet Take 1 tablet by mouth every 6 hours as needed for Pain (max 1-2 per day) for up to 56 days. 84 tablet 0    pregabalin (LYRICA) 50 MG capsule Take 1 capsule by mouth daily for 30 days. 90 capsule 0    levothyroxine (SYNTHROID) 100 MCG tablet TAKE ONE TABLET BY MOUTH DAILY 90 tablet 0    carBAMazepine (TEGRETOL XR) 200 MG extended release tablet Take 2 tablets by mouth 2 times daily 60 tablet 0    isosorbide mononitrate (IMDUR) 60 MG extended release tablet Take 1 tablet by mouth daily 30 tablet 1    metoprolol tartrate (LOPRESSOR) 50 MG tablet Take 0.5 tablets by mouth 2 times daily 30 tablet 1    furosemide (LASIX) 40 MG tablet Take 40 mg by mouth daily      potassium chloride (KLOR-CON M) 20 MEQ extended release tablet Take 1.5 tablets by mouth daily 135 tablet 1    Roflumilast 250 MCG TABS Take 250 mg by mouth Indications: at night      pantoprazole (PROTONIX) 20 MG tablet Take 20 mg by mouth daily      ranolazine (RANEXA) 500 MG extended release tablet Take 500 mg by mouth 2 times daily Indications: 1 am 2 pm       atorvastatin (LIPITOR) 80 MG tablet Take 80 mg by mouth daily      amLODIPine (NORVASC) 5 MG tablet Take 2.5 mg by mouth daily       PREDNISONE Take 10 mg by mouth daily at 1800       tiotropium (SPIRIVA) 18 MCG inhalation capsule Inhale 18 mcg into the lungs daily.  montelukast (SINGULAIR) 10 MG tablet Take 10 mg by mouth nightly.  albuterol (PROVENTIL HFA;VENTOLIN HFA) 108 (90 BASE) MCG/ACT inhaler Inhale 2 puffs into the lungs every 6 hours as needed.  aspirin 81 MG EC tablet Take 81 mg by mouth daily.          Allergies:  Acetaminophen-codeine and Codeine    Immunizations :   Immunization History   Administered Date(s) Administered    Influenza Virus Vaccine 10/01/2014    Influenza, High Dose (Fluzone 65 yrs and older) 2015, 10/14/2016, 2017, 2018    Influenza, Eulice Kayleen, adjuvanted, 65 yrs +, IM, PF (Fluad) 10/07/2020    Influenza, Triv, inactivated, subunit, adjuvanted, IM (Fluad 65 yrs and older) 10/08/2019    Pneumococcal Conjugate 13-valent ([id number]) 2015    Pneumococcal Polysaccharide (Vmapolcrb56) 2014    Td, unspecified formulation 2014    Zoster Live (Zostavax) 2013         Social History:    Social History     Tobacco Use    Smoking status: Former Smoker     Last attempt to quit: 1982     Years since quittin.8    Smokeless tobacco: Never Used   Substance Use Topics    Alcohol use: No     Alcohol/week: 0.0 standard drinks    Drug use: No     Social History     Tobacco Use   Smoking Status Former Smoker    Last attempt to quit: 1982    Years since quittin.8   Smokeless Tobacco Never Used      Family History   Problem Relation Age of Onset    Cancer Mother     Tuberculosis Father     Cancer Sister     Heart Disease Brother     Cancer Brother     Heart Attack Brother        REVIEW OF SYSTEMS:     Constitutional:  r fevers+  chills, night sweats  Eyes:  negative for blurred vision, eye discharge, visual disturbance   HEENT:  negative for hearing loss, ear drainage,nasal congestion  Respiratory:  cough+, shortness of breath+  or hemoptysis   Cardiovascular:  negative for chest pain, palpitations, syncope  Gastrointestinal:  negative for nausea, vomiting, diarrhea, constipation, abdominal pain  Genitourinary:  negative for frequency, dysuria, urinary incontinence, hematuria  Hematologic/Lymphatic:  negative for easy bruising, bleeding and lymphadenopathy  Allergic/Immunologic:  negative for recurrent infections, angioedema, anaphylaxis   Endocrine:  negative for weight changes, polyuria, polydipsia and polyphagia  Musculoskeletal:  negative for joint  pain, swelling, decreased range of motion  Integumentary: No rashes, skin lesions  Neurological:  negative for headaches, slurred speech, unilateral weakness  Psychiatric: negative for hallucinations,confusion,agitation. PHYSICAL EXAM:      Vitals:  T max 101.3   /68   Pulse 74   Temp 98.8 °F (37.1 °C) (Oral)   Resp 18   Ht 5' 6\" (1.676 m)   Wt 186 lb 4.6 oz (84.5 kg)   SpO2 97%   BMI 30.07 kg/m²      PHYSICAL EXAM:     In-person bedside physical examination deferred. Pursuant to the emergency declaration under the [address] [place] [address], [address] [place] [address] authority and the [employer] Act, this clinical encounter was conducted to provide necessary medical care. (Also consistent with new provisions and guidance offered by [facility] on March 18, 2020 in setting of COVID 19 outbreak and in order to preserve personal protective equipment in accordance with the flexibilities announced by CMS on March 30, 2020)   References: https://[facility]. [facility]/Portals/0/Resources/COVID-19/3_18%20Telemed%20Guidance%20Updated%20March%2018. pdf?[email or web address]                      https://[facility]. [facility]/Portals/0/Resources/COVID-19/3_18%20Telemed%20Guidance%20Updated%20March%2018. pdf?[email or web address]                      http://[employer]/. pdf                              Pulmonary: deferred  Abdomen/GI: deferred  Neuro: deferred  Skin: deferred  Musculoskeletal:  deferred  Genitourinary: Deferred  Psych: deferred  Lymphatic/Immunologic: deferred       DATA:    CBC:   Lab Results   Component Value Date    WBC 11.6 (H) 10/21/2020    HGB 9.6 (L) 10/21/2020    HCT 30.3 (L) 10/21/2020    MCV 87.2 10/21/2020     10/21/2020     RENAL:   Lab Results   Component Value Date    CREATININE 1.0 10/21/2020    BUN 13 10/21/2020     10/21/2020    K 3.9 10/21/2020    CL 95 (L) 10/21/2020    CO2 32 FDA under an Emergency Use            MICRO: cultures reviewed and updated by me   Procedure  Component  Value  Units  Date/Time    Culture, Blood 2 [[id number]]   Collected: 10/20/20 2344    Order Status: Sent  Specimen: Blood  Updated: 10/21/20 0014    Culture, Blood 1 [[id number]]   Collected: 10/20/20 2344    Order Status: Sent  Specimen: Blood  Updated: 10/21/20          Blood Culture: No results found for: BC, BLOODCULT2    Viral Culture:    Lab Results   Component Value Date    COVID19 DETECTED 10/21/2020     Urine Culture: No results for input(s): Rene Willis in the last 72 hours. Scheduled Meds:   sodium chloride flush  10 mL Intravenous 2 times per day    enoxaparin  30 mg Subcutaneous BID    aspirin  81 mg Oral Daily    atorvastatin  80 mg Oral Daily    levothyroxine  100 mcg Oral Daily    metoprolol tartrate  25 mg Oral BID    pantoprazole  20 mg Oral Daily    pregabalin  50 mg Oral Daily    tiotropium  2 puff Inhalation Daily    [START ON 10/22/2020] azithromycin  500 mg Intravenous Q24H    And    [START ON 10/22/2020] cefTRIAXone (ROCEPHIN) IV  1 g Intravenous Q24H    dexamethasone  6 mg Oral Daily    lisinopril  5 mg Oral Daily    insulin lispro  0-12 Units Subcutaneous TID WC    insulin lispro  0-6 Units Subcutaneous Nightly       Continuous Infusions:   dextrose         PRN Meds:  sodium chloride flush, acetaminophen **OR** acetaminophen, ALPRAZolam, perflutren lipid microspheres, ondansetron, guaiFENesin-dextromethorphan, glucose, dextrose, glucagon (rDNA), dextrose    Imaging:   VL Extremity Venous Bilateral         CT CHEST PULMONARY EMBOLISM W CONTRAST   Final Result   Negative for acute pulmonary embolism. Multifocal ground-glass and consolidative airspace disease is suspicious for   infection, including atypical and viral pathogens (including COVID-19). Mild interstitial pulmonary edema.       Mild subpleural reticular abnormality and bronchiolectasis, suggesting underlying interstitial lung disease. Mild mediastinal and hilar lymphadenopathy, presumably reactive. XR CHEST PORTABLE   Final Result   Features of heart failure, with moderate interstitial pulmonary edema. Basilar opacities, favor atelectasis. Superimposed pneumonia or aspiration   could be present in the appropriate context. All pertinent images and reports for the current Hospitalization were reviewed by me. IMPRESSION:    Patient Active Problem List   Diagnosis    Sprain of neck    Sciatica    Cervical spondylosis without myelopathy    Degeneration of cervical intervertebral disc    Cervical radiculitis    Chronic pain syndrome    Stroke ([address]    Postmenopausal    COPD, severe ([address]    HTN (hypertension)    Acquired hypothyroidism    Hypertriglyceridemia    Itching    Anxiety    Coronary artery disease involving native coronary artery of native heart with angina pectoris (HCC)    Rash    Primary osteoarthritis of right knee    Age-related osteoporosis without current pathological fracture    Diabetes mellitus ([address]    COVID-19    Pneumonia    CHF exacerbation ([address]    COPD exacerbation (HCC)    Acute on chronic respiratory failure (HCC)    Sepsis ([address]    Edema of right lower extremity     COVID-19 infection   Severe COPD on home Oxygen  CAD  H/o CHF  Acute hypoxic resp failure  CT chest is abnormal   WBC normal   D dimer elevation      I suspect current resp issues and fevers are from COVID-19 infection as she has severe COPD and home Oxygen risk for progression and she is able to wean down to her home Oxygen level and needs to watch for any progression will start on Dexamethasone. Creat on this admit seems to be normal range and will be able to use IV Remdesivir if necessary         Labs, Microbiology, Radiology and pertinent results from current hospitalization and care every where were reviewed by me as a part of the consultation.     PLAN :  1. Cont Dexamethasone   2. Able to wean down to her home OXYGEN level 2 lts nasal cannula   3. We will consider IV Remdesivir with any change in Oxygen requirements  4. Given her age not sure if Convalescent plasma would be benificial   5. Blood cx and Urine antigens are pending  6. Will be able to d/c IV abx if the antigens and Blood cx negative   7. Trend D dimer and Ferritin   Discussed with patient/Family and Nursing     Thanks for allowing me to participate in your patient's care please call me with any questions or concerns.     Dr. Vinny Pulido MD  [address] [place] [address] Physician  Phone: [phone]   Fax : [phone]

## 2021-08-19 NOTE — PROGRESS NOTES
Subjective: In bed on exam, frail.  Nima pain, sob at present. + diarrhea with TF    Objective:   /53 (BP Location: Left arm)   Pulse 68   Temp 98.1 °F (36.7 °C) (Oral)   Resp 18   Ht 5' 10\" (1.778 m)   Wt 127 lb (57.6 kg)   SpO2 95%   BMI 1 Lab Results   Component Value Date    CREATSERUM 0.27 08/19/2021    BUN 5 08/19/2021     08/19/2021    K 2.7 08/19/2021     08/19/2021    CO2 22.0 08/19/2021    GLU 99 08/19/2021    CA 6.2 08/19/2021    MG 1.4 08/19/2021    PHOS 1.2 08/19/2021 30 tablet, Rfl: 0  atorvastatin 40 MG Oral Tab, Take 40 mg by mouth nightly.  , Disp: , Rfl:           Assessment:  1. Recurrent vt with multiple ICD shocks in setting of hypokalemia- no recurrents.    2. End stage ICM without revascularization options- ef

## 2021-08-19 NOTE — PLAN OF CARE
Reviewed VFSS results w/ SLP and updated MD - pull NG, start diet. Addendum:    Called and Updated dtr on above. She reports she is going [place] the [place] now.     CAROLYNN Watt   3:18 PM

## 2021-08-20 LAB
ANION GAP SERPL CALC-SCNC: 6 MMOL/L (ref 0–18)
BUN BLD-MCNC: 5 MG/DL (ref 7–18)
CALCIUM BLD-MCNC: 8.3 MG/DL (ref 8.5–10.1)
CHLORIDE SERPL-SCNC: 114 MMOL/L (ref 98–112)
CO2 SERPL-SCNC: 25 MMOL/L (ref 21–32)
CREAT BLD-MCNC: 0.36 MG/DL
ERYTHROCYTE [DISTWIDTH] IN BLOOD BY AUTOMATED COUNT: 17.2 %
GLUCOSE BLD-MCNC: 101 MG/DL (ref 70–99)
GLUCOSE BLD-MCNC: 104 MG/DL (ref 70–99)
GLUCOSE BLD-MCNC: 93 MG/DL (ref 70–99)
HAV IGM SER QL: 1.8 MG/DL (ref 1.6–2.6)
HCT VFR BLD AUTO: 25.7 %
HGB BLD-MCNC: 8.6 G/DL
Lab: <0.1 UG/ML
MCH RBC QN AUTO: 32.1 PG (ref 26–34)
MCHC RBC AUTO-ENTMCNC: 33.5 G/DL (ref 31–37)
MCV RBC AUTO: 95.9 FL
OSMOLALITY SERPL CALC.SUM OF ELEC: 297 MOSM/KG (ref 275–295)
PHOSPHATE SERPL-MCNC: 2.3 MG/DL (ref 2.5–4.9)
PLATELET # BLD AUTO: 253 10(3)UL (ref 150–450)
POTASSIUM SERPL-SCNC: 3.3 MMOL/L (ref 3.5–5.1)
RBC # BLD AUTO: 2.68 X10(6)UL
SODIUM SERPL-SCNC: 145 MMOL/L (ref 136–145)
WBC # BLD AUTO: 17.1 X10(3) UL (ref 4–11)

## 2021-08-20 PROCEDURE — 99356 PROLONGED SERV,INPATIENT,1ST HR: CPT | Performed by: NURSE PRACTITIONER

## 2021-08-20 PROCEDURE — 99232 SBSQ HOSP IP/OBS MODERATE 35: CPT | Performed by: HOSPITALIST

## 2021-08-20 PROCEDURE — 99233 SBSQ HOSP IP/OBS HIGH 50: CPT | Performed by: NURSE PRACTITIONER

## 2021-08-20 RX ORDER — MAGNESIUM SULFATE HEPTAHYDRATE 40 MG/ML
2 INJECTION, SOLUTION INTRAVENOUS ONCE
Status: COMPLETED | OUTPATIENT
Start: 2021-08-20 | End: 2021-08-20

## 2021-08-20 RX ORDER — CARVEDILOL 6.25 MG/1
6.25 TABLET ORAL 2 TIMES DAILY WITH MEALS
Status: DISCONTINUED | OUTPATIENT
Start: 2021-08-20 | End: 2021-08-25

## 2021-08-20 RX ORDER — POTASSIUM CHLORIDE 14.9 MG/ML
20 INJECTION INTRAVENOUS ONCE
Status: COMPLETED | OUTPATIENT
Start: 2021-08-20 | End: 2021-08-20

## 2021-08-20 NOTE — PROGRESS NOTES
Updated patient son and wife at the beside of patient medically cleared for discharge. Patient son informed this RN of no caregiver for the weekend \"they are nurses that come from the state and we didn't know, so there is no one. \"     Updated patient trudy

## 2021-08-20 NOTE — PHYSICAL THERAPY NOTE
Attempted to see Pt this PM - RN aware of attempt. Pt and Pt's family in discussions of palliative versus hospice. Pt not participating in skilled PT services.   Will remain on Restorative Team.

## 2021-08-20 NOTE — PROGRESS NOTES
[facility]  Progress Note     Shannon Leonardo Patient Status:  Inpatient    1945 MRN [id number]   [facility] 6NE-A Attending Dr. Duran Loredo Day # 8 PCP Roberta Lawrence MD     Chief Complaint: Respiratory failure, ICD shock    S last 168 hours.          COVID-19 Lab Results    COVID-19  Lab Results   Component Value Date    COVID19 Not Detected 08/12/2021    COVID19 Not Detected 05/20/2021    COVID19 Not Detected 01/13/2021       Pro-Calcitonin  No results for input(s): PCT in the monitor  12. Thrombocytopenia, improved  13. Memory impairment, nondecisional  14. Urinary retention, improving  1.  Watch bladder scan    Quality:  · DVT Prophylaxis: Lovenox  · CODE status: Full code  · Jaeger: no  · If COVID testing is negative, may disco

## 2021-08-20 NOTE — PLAN OF CARE
Received pt at 1930. Oriented to self and seemingly to place - nonverbal but able to nod and point. HOB elevated. Poor appetite, medication given in pudding and thickened juice.   Pt ate 1 bite of soup, 4 bites of pudding, and 6 spoons of juice this even of antiarrhythmic and heart rate control medications as ordered  - Initiate emergency measures for life threatening arrhythmias  - Monitor electrolytes and administer replacement therapy as ordered  Outcome: Progressing     Problem: RESPIRATORY - ADULT  Go maintained  Description: INTERVENTIONS:  - Monitor labs and assess for signs and symptoms of volume excess or deficit  - Monitor intake, output and patient weight  - Monitor urine specific gravity, serum osmolarity and serum sodium as indicated or ordered

## 2021-08-20 NOTE — PROGRESS NOTES
[address] [facility] 149 Follow Up    Kayleen Irving Patient Status:  Inpatient    1945 MRN [id number]   [facility] 8NE-A Attending Candida Nix MD   [facility] Day # 8 PCP Devan Richter MD     Date of visit:  2021  Day 8 of Activity Level Self-Care Intake Consciousness   100 Full Normal Full   Normal Full   90 Full No disease  Normal Full Normal Full   80 Full Some disease  Normal w/effort Full Normal or  Reduced Full   70 Reduced Some disease  Can't perform job Full Normal o benefits and burdens of feeding tubes in detail. Reviewed body's own compensatory mechanisms to provide comfort to a patient who is in more of an EOL phase vs restorative phase when their stomach is empty a feeling of euphoria can occur.  They can eat (by m support provided to Saira and his family.     Disposition: Home with community palliative    Problem List:       Patient Active Problem List:     Palpitations     AICD discharge     S/P CABG (coronary artery bypass graft)     Ischemic cardiomyopathy face-to-face contact, history taking, physical examination, and > 50% was spent counseling and coordinating care. Reviewed the above with patient's RN Flor orellana and via Epic messages to Dr. Dominik Arenas and Cardiology APRN.     Thank you for inviting [facility]

## 2021-08-20 NOTE — PROGRESS NOTES
[facility]     Cardiology Progress Note    Lizz Mock Patient Status:  Inpatient    1945 MRN [id number]   [facility] 8NE-A Attending Dalila Ahumada, MD   Hosp Day # 8 PCP Norma Patel MD       SUBJECTIVE:  Remains in bed.   We rashes or unusual skin lesions. Respiratory:  GARCIA. Cardiovascular:  Denies any chest pain, dizziness, syncope or claudication. GI:  Denies any abdominal pain, nausea, vomiting or change in bowel habits. : Denies any difficulty with urination.    Bhaskar Champagne meals  ipratropium-albuterol (DUONEB) nebulizer solution 3 mL, 3 mL, Nebulization, QID  Mexiletine HCl (MEXITIL) cap 150 mg, 150 mg, Oral, Q8H JOSE ANGEL  Loperamide HCl (IMODIUM) cap 2 mg, 2 mg, Oral, QID PRN  Loperamide HCl (IMODIUM) 1 MG/7.5ML solution 2 mg, 2 decide to have ICD therapies turned off.  Would keep pacing on unless they are withdrawing all care.      ____________________________  Analy Snider MD, [facility]  Cardiac Electrophysiololgy  [address] [place] [address]  8/20/2021

## 2021-08-21 LAB
ALDOSTERONE/RENIN ACTIVITY RATIO: 0.1 RATIO
ALDOSTERONE: 4.2 NG/DL
ATRIAL RATE: 76 BPM
HAV IGM SER QL: 1.6 MG/DL (ref 1.6–2.6)
P AXIS: 29 DEGREES
P-R INTERVAL: 132 MS
PHOSPHATE SERPL-MCNC: 1.8 MG/DL (ref 2.5–4.9)
POTASSIUM SERPL-SCNC: 2.1 MMOL/L (ref 3.5–5.1)
POTASSIUM SERPL-SCNC: 3.2 MMOL/L (ref 3.5–5.1)
Q-T INTERVAL: 358 MS
QRS DURATION: 82 MS
QTC CALCULATION (BEZET): 402 MS
R AXIS: 21 DEGREES
RENIN ACTIVITY: 30.4 NG/ML/HR
T AXIS: 17 DEGREES
VENTRICULAR RATE: 76 BPM

## 2021-08-21 PROCEDURE — 99232 SBSQ HOSP IP/OBS MODERATE 35: CPT | Performed by: HOSPITALIST

## 2021-08-21 RX ORDER — MAGNESIUM SULFATE HEPTAHYDRATE 40 MG/ML
2 INJECTION, SOLUTION INTRAVENOUS ONCE
Status: COMPLETED | OUTPATIENT
Start: 2021-08-21 | End: 2021-08-21

## 2021-08-21 RX ORDER — POTASSIUM CHLORIDE 14.9 MG/ML
20 INJECTION INTRAVENOUS ONCE
Status: COMPLETED | OUTPATIENT
Start: 2021-08-21 | End: 2021-08-21

## 2021-08-21 NOTE — CONSULTS
[facility]  Report of Consultation    Ghada Madsen Patient Status:  Inpatient    1945 MRN [id number]   [facility] 8NE-A Attending Dandre Ballesteros MD   [facility] Day # 9 PCP Kingsley Espino MD     Reason for Consultation:  peg    Holly Pack failure ([facility]     Dysphagia        History:  Past Medical History:   Diagnosis Date   • AICD (automatic cardioverter/defibrillator) present    • Cancer ([facility]    • Congestive heart disease ([facility]    • High blood pressure    • High cholesterol    • Hypotensio Daily PRN  Fleet Enema (FLEET) 7-19 GM/118ML enema 133 mL, 1 enema, Rectal, Once PRN  Midazolam HCl (VERSED) 2 MG/2ML injection 2 mg, 2 mg, Intravenous, Q5 Min PRN  famoTIDine (PEPCID) injection 20 mg, 20 mg, Intravenous, BID        No current facility-adm icterus  NECK:non tender, supple  RESPIRATORY: cta  CARDIOVASCULAR: reg rate  ABDOMEN: thin, non distended. No pain w/ palpation. No katarina/guard/rigidity   EXTREMITIES: no le edema  NEURO:  Awake.    PSYCH: unable to assess           LAB/IMAGING RESULTS: Recommendations: Crushed in puree  Treatment Plan/Recommendations: Aspiration precautions     HISTORY   Background/Objective Information:   Patient is a 68 y/o male known to this service for previous dysphagia assessment and management.  VFSS recommended to Presented: Thin liquids; Nectar thick liquids/ Mildly thick;Puree;Hard solid to assess oropharyngeal swallow function and assess for compensatory strategies to improve safe swallow function.     THIN LIQUIDS  Method of Presentation: Teaspoon;Straw  Oral Pha despite effort     Overall Impression: Patient in an upright seated position for exam. NG tube in place. Patient with loose wet sounding cough prior to and throughout exam. Cough was weak and nonproductive.  No family accompanied the patient to exam.     Pa patient will tolerate Soft & Bite-sized consistency and mildly thick liquids without overt signs or symptoms of aspiration with 90 % accuracy over 2 session(s).   In Progress   Goal #2 The patient/family/caregiver will demonstrate understanding and implemen

## 2021-08-21 NOTE — DIETARY NOTE
[facility]    NUTRITION ASSESSMENT    Pt meets severe malnutrition criteria.     CRITERIA FOR MALNUTRITION DIAGNOSIS:  Criteria for severe malnutrition diagnosis: acute illness/injury related to wt loss greater than 7.5% in 3 months, energy intake less Hospice prior to admission, now revoked. Pt is full code at this time, ongoing [address] discussion with with PC noted. Per Report, family OK with starting TF, however pt with diarrhea. RN starting imodium.  Recommend if aggressive care desired start TF to meet suboptimal energy intake related to respiratory process or complication of therapy which results in mechanical ventilation as evidenced by need for NPO status    MONITOR AND EVALUATE/NUTRITION GOALS:  1. Gradual weight gain of at least .5 lbs per week - Me

## 2021-08-21 NOTE — PLAN OF CARE
Patient laying in bed, incomprehensible, nods to things, alert to self. Right picc line secure. Bladder scan this AM with 107. Apaced on cardiac monitor. RUE edema noted. BLE edema noted, elevated.  Low appetite, patient not wanting to eat, patient takes si

## 2021-08-21 NOTE — PROGRESS NOTES
[facility]  Progress Note     Quique Mckinnon Patient Status:  Inpatient    1945 MRN [id number]   [facility] 6NE-A Attending Dr. Karen Acosta Day # 9 PCP Jason Lux MD     Chief Complaint: Respiratory failure, ICD shock    S 08/12/2021    COVID19 Not Detected 05/20/2021    COVID19 Not Detected 01/13/2021       Pro-Calcitonin  No results for input(s): PCT in the last 168 hours. Cardiac  No results for input(s): TROP, PBNP in the last 168 hours.     Creatinine Kinase  No resul impairment, nondecisional  14. Urinary retention, improving  1. Watch bladder scan    Quality:  · DVT Prophylaxis: Lovenox  · CODE status: Full code  · Jaeger: no  · If COVID testing is negative, may discontinue isolation: Yes        Jessica Simons M.D.   Aby Finer

## 2021-08-21 NOTE — PROGRESS NOTES
[facility]     Cardiology Progress Note        Kristina Larios Patient Status:  Inpatient    1945 MRN [id number]   [facility] 8NE-A Attending Brian Michelle MD   Hosp Day # 9 PCP Dillon Zuniga MD       Subjective:  Awake, al nursing. FINA Hunt  8/21/2021  6:18 AM      K today 2.1 despite aggressive oral replacement. This is chronic issue however now likely worsened by diarrhea. Does not seem to absorb K. GI to see late today- will await their input.      Seen

## 2021-08-21 NOTE — PLAN OF CARE
Patient laying in bed, incomprehensible, nods to things, alert to self. Right picc line secure. Apaced on cardiac monitor. RUE edema noted, elevated. Generalized non pitting edema.  Low appetite, patient not wanting to take full amount of oral K+, K replac

## 2021-08-22 LAB
ANION GAP SERPL CALC-SCNC: 3 MMOL/L (ref 0–18)
BUN BLD-MCNC: 4 MG/DL (ref 7–18)
CALCIUM BLD-MCNC: 8.1 MG/DL (ref 8.5–10.1)
CHLORIDE SERPL-SCNC: 116 MMOL/L (ref 98–112)
CO2 SERPL-SCNC: 27 MMOL/L (ref 21–32)
CREAT BLD-MCNC: 0.37 MG/DL
GLUCOSE BLD-MCNC: 87 MG/DL (ref 70–99)
HAV IGM SER QL: 2.5 MG/DL (ref 1.6–2.6)
OSMOLALITY SERPL CALC.SUM OF ELEC: 298 MOSM/KG (ref 275–295)
PHOSPHATE SERPL-MCNC: 3 MG/DL (ref 2.5–4.9)
POTASSIUM SERPL-SCNC: 3 MMOL/L (ref 3.5–5.1)
POTASSIUM SERPL-SCNC: 3.1 MMOL/L (ref 3.5–5.1)
POTASSIUM SERPL-SCNC: 4 MMOL/L (ref 3.5–5.1)
SODIUM SERPL-SCNC: 146 MMOL/L (ref 136–145)

## 2021-08-22 PROCEDURE — 99232 SBSQ HOSP IP/OBS MODERATE 35: CPT | Performed by: HOSPITALIST

## 2021-08-22 RX ORDER — SPIRONOLACTONE 25 MG/1
25 TABLET ORAL DAILY
Status: DISCONTINUED | OUTPATIENT
Start: 2021-08-22 | End: 2021-08-25

## 2021-08-22 RX ORDER — POTASSIUM CHLORIDE 14.9 MG/ML
20 INJECTION INTRAVENOUS ONCE
Status: COMPLETED | OUTPATIENT
Start: 2021-08-22 | End: 2021-08-22

## 2021-08-22 NOTE — PROGRESS NOTES
Progress Note  Julio Pena Patient Status:  Inpatient    1945 MRN [id number]   [facility] 8NE-A Attending Mariola Ruiz MD   [facility] Day # 10 PCP Raymond Estrada MD     Subjective:  No acute events overnight  Tired and weak  No compl --  8.6*   HCT 27.7*  --  25.7*   MCV 94.5  --  95.9   MCH 32.1  --  32.1   MCHC 33.9  --  33.5   RDW 16.0  --  17.2   NEPRELIM 7.13  --   --    WBC 8.6  --  17.1*   .0* 190.0 253.0         No results for input(s): TROP, CK in the last 168 hours.

## 2021-08-22 NOTE — PROGRESS NOTES
[facility]  Progress Note    Vianney Benedict Patient Status:  Inpatient    1945 MRN [id number]   [facility] 8NE-A Attending Jeremy Prabhakar MD   Hosp Day # 8 PCP Juliano Gagnon MD     Subjective:  Vianney Benedict is a(n) 67year old (MEXITIL) cap 150 mg, 150 mg, Oral, Q8H JOSE ANGEL  Loperamide HCl (IMODIUM) cap 2 mg, 2 mg, Oral, QID PRN  Loperamide HCl (IMODIUM) 1 MG/7.5ML solution 2 mg, 2 mg, Per NG Tube, QID PRN  amiodarone (PACERONE) tab 200 mg, 200 mg, Oral, Daily  enoxaparin (LOVENOX) 08/22/2021    MG 2.5 08/22/2021    PHOS 3.0 08/22/2021             )             Assessment and Plan:  Patient Active Problem List:     Palpitations     AICD discharge     S/P CABG (coronary artery bypass graft)     Ischemic cardiomyopathy     V-tach ([facility] [address] I discussed his care today as well.   Over 20 min spent w/ family discussing the peg, risks, alternative, limitations, benefits    Scott Llanos MD  [address] [place] [address] Gastroenterology

## 2021-08-22 NOTE — PROGRESS NOTES
[facility]  Progress Note     Amilcar Champagne Patient Status:  Inpatient    1945 MRN [id number]   [facility] 6NE-A Attending Dr. Jennifer Ochoa # 10 PCP Darin Wyatt MD     Chief Complaint: Respiratory failure, ICD shock Detected 05/20/2021    COVID19 Not Detected 01/13/2021       Pro-Calcitonin  No results for input(s): PCT in the last 168 hours. Cardiac  No results for input(s): TROP, PBNP in the last 168 hours.     Creatinine Kinase  No results for input(s): CK in the tube, await recs    If plan to pursue PEG,  Patient is at high but acceptable risk      Quality:  · DVT Prophylaxis: Lovenox  · CODE status: Full code  · Jaeger: no  · If COVID testing is negative, may discontinue isolation: Yes        Chucky Ricci M.D.

## 2021-08-22 NOTE — PLAN OF CARE
Patient laying in bed, awake, alert to self. Patient able to nod and point. Right PICC line in place, flushing and with blood return noted. 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 Patient incontinent of bowel and bladder.  General

## 2021-08-22 NOTE — PLAN OF CARE
Pt's orientation hard to assess with aphasia, although pt did tell me his birthday. Room air, , O2 sats wnl. Respiratory sees pt with cpt. Congested, lungs clear/diminished. A-paced on tele. Mepilex and cream on pt sacrum. Q2 turns. Denies pain.  All nee RESPIRATORY - ADULT  Goal: Achieves optimal ventilation and oxygenation  Description: INTERVENTIONS:  - Assess for changes in respiratory status  - Assess for changes in mentation and behavior  - Position to facilitate oxygenation and minimize respiratory as indicated or ordered  - Monitor response to interventions for patient's volume status, including labs, urine output, blood pressure (other measures as available)  - Encourage oral intake as appropriate  - Instruct patient on fluid and nutrition restrict

## 2021-08-23 LAB
ANION GAP SERPL CALC-SCNC: 4 MMOL/L (ref 0–18)
BUN BLD-MCNC: 7 MG/DL (ref 7–18)
CALCIUM BLD-MCNC: 7.9 MG/DL (ref 8.5–10.1)
CHLORIDE SERPL-SCNC: 116 MMOL/L (ref 98–112)
CO2 SERPL-SCNC: 25 MMOL/L (ref 21–32)
CREAT BLD-MCNC: 0.4 MG/DL
GLUCOSE BLD-MCNC: 81 MG/DL (ref 70–99)
OSMOLALITY SERPL CALC.SUM OF ELEC: 297 MOSM/KG (ref 275–295)
POTASSIUM SERPL-SCNC: 3.4 MMOL/L (ref 3.5–5.1)
SODIUM SERPL-SCNC: 145 MMOL/L (ref 136–145)

## 2021-08-23 PROCEDURE — 99232 SBSQ HOSP IP/OBS MODERATE 35: CPT | Performed by: HOSPITALIST

## 2021-08-23 PROCEDURE — 99233 SBSQ HOSP IP/OBS HIGH 50: CPT | Performed by: CLINICAL NURSE SPECIALIST

## 2021-08-23 NOTE — PROGRESS NOTES
[facility]  Progress Note    Fox Hdz Patient Status:  Inpatient    1945 MRN [id number]   [facility] 8NE-A Attending Bina Bailey MD   [facility] Day # 6 PCP Vernon Quinteros MD     Subjective:  Fox Hdz is a(n) 67year old Acute hypoxemic respiratory failure (HCC)     Dysphagia       Aminah Wahl is a(n) 67year old male. Pt with choking possible aspiration anorexia. Was on hospice reversed. Still choking. Awaiting decision regarding PEG from family.     DW wife this AM.

## 2021-08-23 NOTE — PROGRESS NOTES
Paged Jose E العلي, SLP regarding coughing while attempting to administer a crushed medication in applesauce. Patient was placed in an upright position and a small bolus of the applesauce/medication bolus was introduced.  The patient had delayed oral bolus

## 2021-08-23 NOTE — PLAN OF CARE
Met with family and [facility] to discuss plan of care. Plan to initiate hospice status. Patient and familyconsented to having ICD therapies disabled.

## 2021-08-23 NOTE — PLAN OF CARE
Pt is alert, orientation unknown with pt's aphasia. Supplied with a white board and marker for communication. Vital signs stable. , O2 sats wnl. Denies any pain. Q2 repositioning.    While feeding patient thickened liquids, pt seemed to be really struggl emergency measures for life threatening arrhythmias  - Monitor electrolytes and administer replacement therapy as ordered  Outcome: Progressing     Problem: RESPIRATORY - ADULT  Goal: Achieves optimal ventilation and oxygenation  Description: INTERVENTIONS and symptoms of volume excess or deficit  - Monitor intake, output and patient weight  - Monitor urine specific gravity, serum osmolarity and serum sodium as indicated or ordered  - Monitor response to interventions for patient's volume status, including l

## 2021-08-23 NOTE — PROGRESS NOTES
[address] Follow Up    Debra Lomax  [id number]  Patient seen at: [facility] Day #11    Subjective:      Patient nodding to having discomfort in his bottom.      Patient seen and evaluated, family daughter T suspension 30 mL, 30 mL, Oral, Daily PRN  •  Fleet Enema (FLEET) 7-19 GM/118ML enema 133 mL, 1 enema, Rectal, Once PRN  •  Midazolam HCl (VERSED) 2 MG/2ML injection 2 mg, 2 mg, Intravenous, Q5 Min PRN  •  famoTIDine (PEPCID) injection 20 mg, 20 mg, Demarco Roys PATHOLOGIST REPORT FOR FULL ASSESSMENT OF SWALLOWING MECHANISM.            Dictated by (CST): Frantz Luna MD on 8/19/2021 at 3:12 PM       Finalized by (CST): Frantz Luna MD on 8/19/2021 at 3:12 PM         Physical Exam:     Vital Signs: /61   Pul pursue PEG placement and to reconsider resuming/reinstating hospice care at home. Cardiology APRN also present toward end of visit to discuss turning off AICD but leaving pacemaker on. Patient indicated his approval for this.      Daughter requested a littl Ventricular tachycardia (HCC)     Hypokalemia     Colon distention     High blood pressure     Stroke [facility])     Functional diarrhea     Palliative care encounter     Counseling regarding advance care planning and goals of care     Ventricular tachycardia (

## 2021-08-23 NOTE — PROGRESS NOTES
Progress Note  Nayelipan Cuenca Patient Status:  Inpatient    1945 MRN [id number]   [facility] 8NE-A Attending Wero Rivas MD   [address] Day # 11 PCP Renzo Sims MD     Subjective:  Overnight events: possible aspiration witnessed by input(s): TROP, CK in the last 168 hours. Diagnostics:             Physical Exam:       Constitutional: He is thin. No distress. cachectic   Eyes: No scleral icterus. Neck: No JVD present. Cardiovascular: Normal rate and regular rhythm.     No murm ____________________________  Jayy Munoz MD, [facility]  Cardiac Electrophysiololgy  [address] [place] [address]  8/23/2021

## 2021-08-23 NOTE — HOSPICE RN NOTE
Phone call placed, VM left, for daughter Clemente Schultz to call to set up a time to meet with hospice.

## 2021-08-23 NOTE — PROGRESS NOTES
Informed family that one visitor is allowed to stay with the patient overnight. Patient's daughter stated that she was aware but that they have been going home at night to care for the patient's wife.

## 2021-08-23 NOTE — PLAN OF CARE
Dtr/son/patient decided against peg tube together. They have questions about turning off ICD - Cards apn talking with family now. Palliative care/RN assisted with discussion. Appreciate consultants.      CAROLYNN Slade   11:59 AM     Addendum: discuss

## 2021-08-23 NOTE — PROGRESS NOTES
[facility]  Progress Note     [facility] Patient Status:  Inpatient    1945 MRN [id number]   [facility] 6NE-A Attending Dr. Fleta Meckel # 6 PCP Rao Gibbs MD     Chief Complaint: Respiratory failure, ICD shock results for input(s): PTP, INR in the last 168 hours.          COVID-19 Lab Results    COVID-19  Lab Results   Component Value Date    COVID19 Not Detected 08/12/2021    COVID19 Not Detected 05/20/2021    COVID19 Not Detected 01/13/2021       Pro-Calcitonin impairment, nondecisional  1. Answering basic ROS, simple questions  13. Urinary retention, improving    Pt declined peg tube again today. He is aware he will pass away from malnutrition if he declines TF/peg.   He did not tolerate TF very well prior w/ fr

## 2021-08-23 NOTE — SLP NOTE
SPEECH DAILY NOTE - INPATIENT    ASSESSMENT & PLAN   ASSESSMENT  Pt seen for dysphagia tx to assess tolerance with recommended diet, ensure proper utilization of aspiration precautions and provide pt/family education.   VFSS completed previously and recomme Undetermined    Treatment Plan  Treatment Plan/Recommendations: Aspiration precautions    Interdisciplinary Communication: Discussed with RN            GOALS  Goal #1 The patient will tolerate puree consistency and mildly thick liquids without overt signs

## 2021-08-24 LAB
HAV IGM SER QL: 2.3 MG/DL (ref 1.6–2.6)
POTASSIUM SERPL-SCNC: 3 MMOL/L (ref 3.5–5.1)

## 2021-08-24 PROCEDURE — 99232 SBSQ HOSP IP/OBS MODERATE 35: CPT | Performed by: HOSPITALIST

## 2021-08-24 NOTE — PAYOR COMM NOTE
--------------  CONTINUED STAY REVIEW    Caro Dorsey MEDICARE ADV PPO  Subscriber #:  [id number]  Authorization Number: [id number]        8/23 EPDRO    Joann Vaughntner is a(n) 67year old male. Pt with choking possible aspiration anorexia.  Was on hospice rever recommend home w/ hospice.  Will need to address ICD also.  CAROLYNN Del Valle  9:09 AM      Agree with above assessment and plan     S- no complaints, awake, alert  General- NAD  Chest- CTAB  CVS- RRR  Abdo- soft, Nt, bS+     Plan     I have had sever DAY:  enoxaparin (LOVENOX) 40 MG/0.4ML injection 40 mg     Date Action Dose Route User    8/23/2021 1220 Given 40 mg Subcutaneous (Right Lower Abdomen) Toya Blair, RN      famoTIDine (PEPCID) injection 20 mg     Date Action Dose Route User    8/23/2021 211

## 2021-08-24 NOTE — PLAN OF CARE
Assumed care of the patient at 07:00 AM. Patient has expressive asphasia but can nod appropriately. Bilateral breath sounds diminished. Room air. Cough is non-productive, congested, moist, and weak. A-paced on tele w/bbb. Non-pitting edema to the RUE.  Inco quality of pulses, skin color and temperature  - Assess for signs of decreased coronary artery perfusion - ex.  Angina  - Evaluate fluid balance, assess for edema, trend weights  Outcome: Progressing  Goal: Absence of cardiac arrhythmias or at baseline  Harman assess patient for signs and symptoms of electrolyte imbalances  - Administer electrolyte replacement as ordered  - Monitor response to electrolyte replacements, including rhythm and repeat lab results as appropriate  - Fluid restriction as ordered  - Inst

## 2021-08-24 NOTE — SLP NOTE
SPEECH DAILY NOTE - INPATIENT    ASSESSMENT & PLAN   ASSESSMENT  Patient seen today for ongoing assessment of diet tolerance.  Contact by RN yesterday afternoon reporting continued coughing with PO intake when providing crushed medications in puree and was onset and pt's limited ability/motivation to participate with therapeutic activities. SLP will continue to follow peripherally and provide assistance as needed.     Diet Recommendations - Solids: Puree  Diet Recommendations - Liquids: Nectar thick liquids/

## 2021-08-24 NOTE — PROGRESS NOTES
Abdiel Melvin received a text message back that Kayleen Branch and her brother Lazarus Gum will be here in 25 minutes. CAROLYNN Ybarra and Gertrude notified.

## 2021-08-24 NOTE — CM/SW NOTE
tyrone spoke to lulu of residential hospice who confirms that pts family did not come for the 10 am hospice eval appt today. They have left messages but no response. No family currently at bedside. tyrone placed call to spouse, no answer and no option for VM.  tyrone

## 2021-08-24 NOTE — PROGRESS NOTES
Patient's wife arrived with private caregiver, Jennifer Quintanilla. Jennifer Quintanilla stated that she attempted to get in contact with TJ earlier today and was unable.      Jennifer Quintanilla sent a text message to [facility] to let her know that the hospital has made numerous attempts to get into co

## 2021-08-24 NOTE — PLAN OF CARE
Assumed care of the patient at 07:00 AM. Patient has expressive asphasia but can nod appropriately. Bilateral breath sounds diminished. Room air. Cough is non-productive, congested, moist, and weak. A-paced on tele w/bbb. Non-pitting edema to the RUE.  Inco threatening arrhythmias  - Monitor electrolytes and administer replacement therapy as ordered  Outcome: Progressing     Problem: RESPIRATORY - ADULT  Goal: Achieves optimal ventilation and oxygenation  Description: INTERVENTIONS:  - Assess for changes in r excess or deficit  - Monitor intake, output and patient weight  - Monitor urine specific gravity, serum osmolarity and serum sodium as indicated or ordered  - Monitor response to interventions for patient's volume status, including labs, urine output, bloo

## 2021-08-24 NOTE — PLAN OF CARE
Pt/family arrived after 1pm and hospice liaison returned to talk with family. They are agreeable with hospice and currently determining what equipment will be needed. Dtr asked what meds he will get at discharge.   We reviewed he is strict NPO and cannot

## 2021-08-24 NOTE — PROGRESS NOTES
Progress Note  Amira Mcginnis Patient Status:  Inpatient    1945 MRN [id number]   [facility] 8NE-A Attending Jennifer Voss MD   [facility] Day # 12 PCP Paula Bazan MD     Subjective:  No overnight events    This morning, tired and weak 32.1   MCHC  --  33.5   RDW  --  17.2   WBC  --  17.1*   .0 253.0         No results for input(s): TROP, CK in the last 168 hours. Diagnostics:             Physical Exam:       Constitutional: He is thin. No distress.    cachectic   Eyes: No scler Plan  - await long term dispo on goals of care /hospice      ____________________________  Valentin Hodgkins, MD, [facility] - [facility]  Cardiac Electrophysiololgy  [address] [place] [address]  8/24/2021

## 2021-08-24 NOTE — CM/SW NOTE
SW came to room to meet with family. NO family present. Called daughter .  Left message with call back number

## 2021-08-24 NOTE — PROGRESS NOTES
[facility]  Progress Note     Oli Vicente Patient Status:  Inpatient    1945 MRN [id number]   [facility] 6NE-A Attending Dr. Jalloh Reason Day # 12 PCP Rambo Diaz MD     Chief Complaint: Respiratory failure, ICD shock (based on SCr of 0.4 mg/dL (L)). No results for input(s): PTP, INR in the last 168 hours.          COVID-19 Lab Results    COVID-19  Lab Results   Component Value Date    COVID19 Not Detected 08/12/2021    COVID19 Not Detected 05/20/2021    COVID19 Not D nondecisional  1. Answering basic ROS, simple questions  14.  Urinary retention, improving    Quality:  · DVT Prophylaxis: Lovenox  · CODE status: Full code  · Jaeger: no  · If COVID testing is negative, may discontinue isolation: Yes    Pt/family declined p

## 2021-08-24 NOTE — CM/SW NOTE
SW met with pt and family. Discussed hospice services and goals of care . Daughter Karissa Grumbling along with brother and wife completed consents. Plan to dc after additional equipment and meds delivered.  We are planning for a 10am admit at home in the morning ,

## 2021-08-25 VITALS
OXYGEN SATURATION: 96 % | SYSTOLIC BLOOD PRESSURE: 129 MMHG | HEART RATE: 68 BPM | WEIGHT: 127 LBS | DIASTOLIC BLOOD PRESSURE: 64 MMHG | BODY MASS INDEX: 18.18 KG/M2 | HEIGHT: 70 IN | RESPIRATION RATE: 18 BRPM | TEMPERATURE: 98 F

## 2021-08-25 PROCEDURE — 99239 HOSP IP/OBS DSCHRG MGMT >30: CPT | Performed by: INTERNAL MEDICINE

## 2021-08-25 RX ORDER — LORAZEPAM 2 MG/ML
0.5 INJECTION INTRAMUSCULAR ONCE
Status: COMPLETED | OUTPATIENT
Start: 2021-08-25 | End: 2021-08-25

## 2021-08-25 NOTE — PROGRESS NOTES
[facility]  Progress Note     Lizz Mock Patient Status:  Inpatient    1945 MRN [id number]   [facility] 6NE-A Attending Dr. Jenifer Sterling Day # 15 PCP Norma Patel MD     Chief Complaint: Respiratory failure, ICD shock Results    COVID-19  Lab Results   Component Value Date    COVID19 Not Detected 08/12/2021    COVID19 Not Detected 05/20/2021    COVID19 Not Detected 01/13/2021       Pro-Calcitonin  No results for input(s): PCT in the last 168 hours.     Cardiac  No result Deana Panda  8:40 AM     Patient seen and examined independently, agree with above except as otherwise noted.     On Physical Exam:    General:  NAD  Lungs: diminished   Heart: RRR, no murmurs  Abdomen: Soft, NTND  Extremities: No Edema    Asse

## 2021-08-25 NOTE — PLAN OF CARE
Patient all set for ambulance, TJ at the bedside. Patient transported via [place] [id number] ambulance.          Problem: Safety Risk - Non-Violent Restraints  Goal: Patient will remain free from self-harm  Description: INTERVENTIONS:  - Apply the least restrictive res status  - Assess for changes in mentation and behavior  - Position to facilitate oxygenation and minimize respiratory effort  - Oxygen supplementation based on oxygen saturation or ABGs  - Provide Smoking Cessation handout, if applicable  - Encourage bron urine output, blood pressure (other measures as available)  - Encourage oral intake as appropriate  - Instruct patient on fluid and nutrition restrictions as appropriate  Outcome: Adequate for Discharge

## 2021-08-25 NOTE — PLAN OF CARE
Assumed care of patient at [address]. Patient alert and oriented, expressive aphasia but is able to nod appropriately. A-paced on telemetry. On room air. Patient refusing most turns/repositioning. Pillow support provided, feet elevated off bed.  Lungs diminished handout, if applicable  - Encourage broncho-pulmonary hygiene including cough, deep breathe, Incentive Spirometry  - Assess the need for suctioning and perform as needed  - Assess and instruct to report SOB or any respiratory difficulty  - Respiratory Ther

## 2021-08-25 NOTE — PROGRESS NOTES
Assumed care at 0730. Patient laying in bed, nods and declines pain. Rhonchi lung sounds with congestion. Plan for discharge today with hospice. Verbal phone consent with residential hospice APN for DNR, POLST form signed.  PICC line removed and intact, no

## 2021-08-25 NOTE — PROGRESS NOTES
Progress Note  Ghada Keys Patient Status:  Inpatient    1945 MRN [id number]   [facility] 8NE-A Attending Dandre Ballesteros MD   [facility] Day # 15 PCP Kingsley Espino MD     Subjective:  No overnight events  Resting, but arousable.   No CP No results for input(s): TROP, CK in the last 168 hours. Diagnostics:             Physical Exam:       Constitutional: He is thin. No distress. cachectic   Eyes: No scleral icterus. Neck: No JVD present.    Cardiovascular: Normal rate and regula

## 2021-08-25 NOTE — HOSPICE RN NOTE
[facility] nursing visit for follow up on hospice consult order. All discharge plans in place set for tomorrow 8/25/21. Ambulance arranged for 9am . DME ordered. Comfort medications ordered. Needs POLST form signed in the morning.  Susu

## 2021-08-25 NOTE — DISCHARGE SUMMARY
[facility] HOSPITALIST  DISCHARGE SUMMARY     Blanquita Castanon Patient Status:  Inpatient    1945 MRN [id number]   [facility] 8NE-A Attending Dr. Yogesh Song Day # 15 PCP Florence Castillo MD     Date of Admission: 2021  Date of [address] ongoing electrolyte replacement due to profound hypokalemia. He also received mag, Phos replacement. We requested palliative care consult. We had multiple goals of care discussions during this 13-day hospitalization.   Family had difficulty deciding on n

## 2021-08-26 NOTE — PAYOR COMM NOTE
Discharge Notification    Patient Name: Austin Finer: Tony Alexandre MEDICARE ADV PPO  Subscriber #: [id number]  Authorization Number: [id number]  Admit Date/Time: 8/12/2021 12:58 AM  Discharge Date/Time: 8/25/2021 9:45 AM

## 2022-01-24 ENCOUNTER — APPOINTMENT (OUTPATIENT)
Dept: CARDIOLOGY | Age: 77
End: 2022-01-24
Attending: INTERNAL MEDICINE

## 2022-04-21 NOTE — DISCHARGE SUMMARY
[facility]ITAL[facility]  DISCHARGE SUMMARY     Amilcar Champagne Patient Status:  Inpatient    1945 MRN [id number]   [facility] 8NE-A Attending No att. providers found   Hosp Day # 9 PCP Darin Wyatt MD     Date of Admission: 2021  Date cough, headache. Patient states he has been eating and drinking adequately. Brief Synopsis: Cardiology was placed on consultation as the patient was admitted by my colleague.   Patient had ventricular tachycardia which was suspected was electrolyte abn tablet  Refills: 3        CONTINUE taking these medications      Instructions Prescription details   acetaminophen 325 MG Tabs  Commonly known as: TYLENOL      Take 650 mg by mouth every 6 (six) hours as needed for Pain.    Refills: 0     [employer] paper prescription for each of these medications  · amiodarone HCl 200 MG Tabs  · Amiodarone HCl 400 MG Tabs  · spironolactone 25 MG Tabs         Follow-up appointment:   No follow-up provider specified.    ------------------------------------------------- Yes

## 2025-02-24 NOTE — PLAN OF CARE
Alert and oriented. Non verbal, communicating by pointing and nodding. On RA. Denies any SOB or chest pain. A paced on tele. Incontinent to bowel and bladder. Denies pain. Total assist. Call light within reach. Q2H turn.        Problem: Safety Risk - Non-Vi ventilation and oxygenation  Description: INTERVENTIONS:  - Assess for changes in respiratory status  - Assess for changes in mentation and behavior  - Position to facilitate oxygenation and minimize respiratory effort  - Oxygen supplementation based on ox to interventions for patient's volume status, including labs, urine output, blood pressure (other measures as available)  - Encourage oral intake as appropriate  - Instruct patient on fluid and nutrition restrictions as appropriate  Outcome: Progressing No

## 2025-06-12 NOTE — IP AVS SNAPSHOT
Patient Demographics     Address  [facility] [address] [address] Phone  [phone] [facility]  [phone] [facility]      Emergency Contact(s)     Name Relation Home Work Mobile    Bebeto Sorenseni Spouse   [phone]    Dinh Bowens Take 40 mg by mouth daily. carvedilol 12.5 MG Tabs  Commonly known as: COREG  Next dose due: Tonight 12/30      Take 1 tablet (12.5 mg total) by mouth 2 (two) times daily with meals.    Samantha Squires MD               Where to Get Your Medication : Qian Solis MD (Physician)         [facility]  [facility] and [facility]     Mamie Gowers Patient Status:  Emergency    1945 MRN [id number]   Location [address] Attending Shon Driver MD   [facility] Day # 0 no •  aspirin 81 MG Oral Tab EC, Take 1 tablet (81 mg total) by mouth daily. , Disp: 30 tablet, Rfl: 0    •  atorvastatin 10 MG Oral Tab, Take 40 mg by mouth daily.   , Disp: , Rfl:         Review of Systems:   A comprehensive 14 point review of systems was com 4. Cerebrovascular disease with residual right hemiparesis  5. HFrEF s/p AICD  6. HTN  7. Hx of prostate CA[FA.2]      Quality:  · DVT Prophylaxis:[FA.1] heparin drip[FA. 2]  · CODE status:[FA.1] full, discussed wi thfamily[FA. 2]  · Jaeger:[FA.1] no[FA. 2] HISTORY OF PRESENT ILLNESS:  A 39-year-old gentleman known to our cardiovascular service. His primary cardiologist has been Dr. Isela Hernandez.   Patient has a known history for severe ischemic cardiomyopathy, EF in the 25% range, has a dual-chamber ICD and ab REVIEW OF SYSTEMS:  What sounds like palpitations coming and going, nothing prolonged or associated with any shortness of breath or diaphoresis, although troponin is elevated. No black or bloody stools. No recent falls, traumas, or injuries.   Rest of rev d: 12/21/2020 16:12:14  t: 12/21/2020 20:47:32  Job [id number]  TJD/  [TD.1]   Attribution Wyatt    TD. 1 - Luwana Baumgarten, MD on 12/22/2020  6:58 AM                     D/C Summary    No notes of this type exist for this encounter.         Physica Weight Bearing Restriction: L upper extremity     L Upper Extremity: Weight Bearing as Tolerated[NL.2]    PAIN ASSESSMENT[NL.1]   Rating: Unable to rate(2/2 aphasia)  Location: points to R side (arm and leg)  Management Techniques: Relaxation;Repositioning Skilled Therapy Provided:  The pt was approached for therapy lying supine in bed. Pt reluctantly agreeable to participate in bed mobility. Attempted to don pt's RLE AFO but pt with increased pain and rotation, and unable to achieve optimal fit.  Thus, AFO Therapist and PT student treating pt in surgical mask, googles and gloves. Patient was not wearing a mask during session. [NL.1]     Attribution Wyatt    NL. 1 - Wolf Torrez PT on 12/29/2020  1:05 PM  NL. 2 - Wolf Torrez PT on 12/29/2020  1:17 PM  N L Upper Extremity: Weight Bearing as Tolerated[NL.2]    PAIN ASSESSMENT[NL.1]   Rating: Unable to rate(2/2 aphasia)  Location: points to R side (arm and leg)  Management Techniques: Relaxation;Repositioning; Activity promotion[NL.2]    BALANCE[NL.1] NL.1 - Darling Parents, PT on 12/29/2020  1:05 PM  NL. 2 - Darling Parents, PT on 12/29/2020  1:17 PM               Physical Therapy Note signed by Ade Matos at 12/28/2020  3:38 PM  Version 1 of 1    Author: Ade Matos Service: Rehab Management Techniques: Relaxation    BALANCE                                                                                                                     Static Sitting: Not tested  Dynamic Sitting: Not tested           Static Standing: Not tested Patient End of Session: In bed;Needs met;Call light within reach;RN aware of session/findings; All patient questions and concerns addressed    ASSESSMENT[NM.1]   Pt presents w/ R hemiplegia of UE/LE, no palpable contraction of R sided muscles.  Pt continues Filed: 12/26/2020  3:44 PM Date of Service: 12/26/2020  3:11 PM Status: Signed    : Naldo Osullivan PT (Physical Therapist)         PHYSICAL THERAPY EVALUATION - INPATIENT     Room Number: 6557/5903-Y  Evaluation Date: 12/26/2020  Type of Evaluatio Prior Level of Stokes: Obtained history from daughter, Sallye Severs, by phone with permission from pt. Patient is non-ambulatory by typically transfers to/from [facility] with assistance via slide board or stand-pivot transfer. Pt can operate WC on his own. -   Sitting down on and standing up from a chair with arms (e.g., wheelchair, bedside commode, etc.): Unable   -   Moving from lying on back to sitting on the side of the bed?: Unable   How much help from another person does the patient currently need. .. Patient is a 67year old male admitted on 12/21/2020 for elevated troponin and chest pain. Pt was diagnosed with NSTEMI. Pt is s/p generator change of dual chamber ICD on 12/23.   Pertinent comorbidities and personal factors impacting therapy include CVA Filed: 12/29/2020  1:07 PM Date of Service: 12/29/2020 12:59 PM Status: Signed    : Sharla Beltran (Occupational Therapist)       OCCUPATIONAL THERAPY TREATMENT NOTE - INPATIENT     Room Number: 7095/8066-F  Session: 1   Number of Visits to Meet Es ACTIVITIES OF DAILY LIVING ASSESSMENT  AM-PAC ‘6-Clicks’ Inpatient Daily Activity Short Form  How much help from another person does the patient currently need…  -   Putting on and taking off regular lower body clothing?: Total  -   Bathing ( Pt is a 68 y/o M admitted to [address] [place] [address] with an admitting diagnosis of  chest pain, MI, AICD discharge ---->pacemaker interrogated 12/22 and found to need replacing, s/p AICD replacement 12/23/20.  Pt is currently functioning signif No notes of this type exist for this encounter. SLP Notes    No notes of this type exist for this encounter.      Multidisciplinary Problems     Active Goals     Not on file          Resolved Goals        Problem: Patient/Family Goals    Goal Priority D

## (undated) NOTE — LETTER
[facility] [address], [address]  Consent for Procedure/Sedation    Date: 12/22/2020    Time: 1800      1.  I authorize the performance upon Oli Vicente the following:   Insertion of Automatic Implantable Cardioverter Defibrill Printed: 2020   5:51 PM  Patient Name: Becky Gann        : 1945       Medical Record #: [id number]

## (undated) NOTE — IP AVS SNAPSHOT
[address]            (For Outpatient Use Only) Initial Admit Date: 1/13/2021   Inpt/Obs Admit Date: Inpt: 1/13/21 / Obs: N/A   Discharge Date:    María Elena Sanches:  [de-identified]   MRN: [de-identified]   CSN: [id number]   CEID: [id number] Group Number:  Insurance Type:    Subscriber Name:  Subscriber :    Subscriber ID:  Pt Rel to Subscriber:    Hospital Account Financial Class: Medicare Advantage    2021

## (undated) NOTE — IP AVS SNAPSHOT
[address]            (For Outpatient Use Only) Initial Admit Date: 10/5/2020   Inpt/Obs Admit Date: Inpt: 10/5/20 / Obs: N/A   Discharge Date:    Doneen Leonardo:  [de-identified]   MRN: [de-identified]   CSN: [id number]   CEID: [id number] Subscriber Name:  Roseann Tom :    Subscriber ID:  Pt Rel to Subscriber:    Hospital Account Financial Class: Medicare Advantage    2020

## (undated) NOTE — LETTER
[facility] [address], [address]  Consent for Procedure/Sedation    Date: 12/22/2020    Time: 1200      1.  I authorize the performance upon Amira Yanezgage the following:cardiac catheterization, left ventricular cineangiography, b Signature of Patient: ____________________________________________________    Signature of person authorized                                     Relationship to  to consent for patient: _________________________ patient: ___________________    Witness: ___

## (undated) NOTE — IP AVS SNAPSHOT
Patient Demographics     Address  [address] [address] Phone  [phone] [facility])  [phone] [facility]      Emergency Contact(s)     Name Relation Home Work Mobile    Karma Sorensen Spouse   [phone]    OPAL-Niles CARRERO \ mouth 2 (two) times daily with meals. Calvin Peña MD         docusate sodium 100 MG Caps  Commonly known as: COLACE  Next dose due: Tomorrow 5/30/21      Take 1 capsule (100 mg total) by mouth 2 (two) times daily.    Kelsie Nguyen DO tab 20 mEq 05/29/21 0931 Given      [id number] acetaminophen (TYLENOL) tab 650 mg 05/28/21 2122 Given      [id number] amiodarone HCl (PACERONE) tab 400 mg 05/28/21 1825 Given      [id number] amiodarone HCl (PACERONE) tab 400 mg 05/29/21 0930 Given      [id number] Bartolome Bower DO at 5/20/2021  8:27 PM  Version 1 of 1    Author: Babita Yao DO Service: Hospitalist Author Type: Physician    Filed: 5/20/2021  8:27 PM Date of Service: 5/20/2021  3:36 PM Status: Signed    : OTIS Padilla Disorder Sister         Allergies: No Known Allergies    Medications:  No current facility-administered medications on file prior to encounter. docusate sodium 100 MG Oral Cap, Take 1 capsule (100 mg total) by mouth 2 (two) times daily. , Disp: 60 capsule, °C) (Temporal)   Resp 24   Wt 147 lb 11.3 oz (67 kg)   SpO2 100%   BMI 21.19 kg/m²   General: No acute distress. Alert and oriented x 3. HEENT: Normocephalic atraumatic. Moist mucous membranes. EOM-I. PERRLA. Anicteric. Neck: No lymphadenopathy. No JVD. Potassium being replaced. Will monitor in CCU. We will continue on mexiletine, carvedilol, amiodarone  2. Hypertension-we will continue carvedilol. 3. Hypokalemia-we will replace per protocol.   4. Chronic systolic heart failure-we will continue on beta- of approximately 15 to 20%, status post ICD placement with multiple shocks, stroke with expressive aphasia and right hemiparesis who presents today with multiple shocks. Patient, due to his his dysarthria, has difficulty expressing himself.   Patient ivelisse not use drugs.     Allergies:  No Known Allergies    Medications:    Current Facility-Administered Medications:   •  Amiodarone HCl (CORDARONE) 450 mg in dextrose 5 % 250 mL infusion, 1 mg/min, Intravenous, Continuous  •  potassium chloride 40 mEq in sodium low.  Patient's had multiple shocks including one just a few minutes ago while I was discussing this with him. There is also a question of him being able to manage himself at home with his right hemiparesis and his multiple medical issues.   He apparently PHYSICAL THERAPY TREATMENT NOTE - INPATIENT    Room Number: 9227/7487-P     Session: 2[AE.1]   Number of Visits to Meet Established Goals: 5[AE.2]    History related to current admission:  Patient was admitted from home on 5/20 after pt felt heart palpitat LE AFO)[AE.2]    WEIGHT BEARING RESTRICTION[AE.1]  Weight Bearing Restriction: None[AE.2]                PAIN ASSESSMENT[AE.1]   Ratin[AE.2]          BALANCE[AE.1] pumps  Hip AB/AD  Knee extension  Leg slides[AE.3]     Upper Extremity[AE.1] Elbow flex/ext, OH Reaching and Wrist flex/ext[AE.3]     Position[AE.1] Supine[AE.3]     Repetitions[AE.1]   10[AE.3]   Sets[AE.1]   1[AE.3]     Patient End of Session: In bed;Nee Pathologist    Filed: 5/21/2021  3:00 PM Date of Service: 5/21/2021  2:52 PM Status: Signed    : Emmie Cano (Speech and Language Pathologist)       ADULT VIDEOFLUOROSCOPIC SWALLOWING STUDY    Admission Date: 5/20/2021  Evaluation Date: 05/21/21 incompletely evaluated on a portable chest radiograph.               Dictated by (CST): Yolande Waller MD on 5/20/2021 at 3:11 PM       Finalized by (CST): Yolande Waller MD on 5/20/2021 at 3:13 PM       Reason for Referral: RN dysphagia screen  Patient with tongue  Residue Severity, Location: Mild/Mod;Valleculae;Pyriform Sinuses;Aryepiglottic folds  Cleared/Reduced with: Secondary swallow  Laryngeal Penetration: During the swallow;Trace;Transient (x1)  Tracheal Aspiration: None  Cough/Throat Clear Response: N posteriorly to the pharynx leading to laryngeal penetration and tracheal aspiration before the swallow with thin liquids by tsp. There was a cough response.   There was no further laryngeal penetration or tracheal aspiration with nectar thick liquids with Luh Villegas 1 - Michelle Freeman on 5/21/2021  2:52 PM                        SLP Note - SLP Notes      SLP Note signed by Michelle Freeman at 5/26/2021 11:15 AM  Version 1 of 1    Author: Michelle Freeman Service: Rehab Author patient/family/caregiver will demonstrate understanding and implementation of aspiration precautions and swallow strategies independently over 1-2 session(s).  In Progress   Goal #3 Pt will tolerate trials of nectar liquids with 100% accuracy across 1-2 ses

## (undated) NOTE — IP AVS SNAPSHOT
Patient Demographics     Address  [facility] [address] [address] Phone  [phone] [facility])  [phone] [facility]      Emergency Contact(s)     Name Relation Home Work Mobile    EduBebetoi Spouse   [phone]    Austin Bowens Please  your prescriptions at the location directed by your doctor or nurse    Bring a paper prescription for each of these medications  carvedilol 12.5 MG Tabs     Information about where to get these medications is not yet available    Ask your nu Ordering provider: Tres Bui MD  10/07/20 3583 Resulting lab: [facility])    Specimen Information    Type Source Collected On   Blood — 10/07/20 1865          Components    Component Value Reference Range Flag Lab   Gl H&P signed by Addis Hill MD at 10/5/2020 10:02 PM  Version 1 of 1    Author: Addis Hill MD Service: — Author Type: Physician    Filed: 10/5/2020 10:02 PM Date of Service: 10/5/2020  9:54 PM Status: Signed    : Addis Hill MD (Physician) A comprehensive 14 point review of systems was completed. Pertinent positives and negatives noted in the HPI.     Physical Exam:[OO.1]    BP (!) 168/96   Pulse 75   Temp 99 °F (37.2 °C) (Temporal)   Resp 18   Ht 6' 4\" (1.93 m)   Wt 180 lb (81.6 kg)   Sp Plan of care discussed with patient and ED physician    Kurt Hicks MD  10/5/2020[OO. 1]                        Electronically signed by Luis Mullins MD on 10/5/2020 10:02 PM   Attribution Key    OO. 1 - Luis Mullins MD on 10/5/2020  9:54 PM  OO. 2 - ILIANA Hinton • AICD (automatic cardioverter/defibrillator) present    • Cancer [facility])    • Hypotension    • Stroke [facility])      Past Surgical History:   Procedure Laterality Date   • BYPASS SURGERY      2007   • OTHER      prostate seeds implanted 2014 for CA     No famil 68 yo gentleman with an ischemic CM - prior CABG/ICD - and left hemispheric stroke with residual right HP.  Received an appropriate shock from his device this evening for sustained monomorphic VT    Replace K+ per protocol    Echo in am - may need to consid • Stroke [facility])        Past Surgical History  Past Surgical History:   Procedure Laterality Date   • BYPASS SURGERY      2007   • OTHER      prostate seeds implanted 2014 for CA       HOME SITUATION  Type of Home: House   Home Layout: One level O2 WALK                  AM-PAC '6-Clicks' INPATIENT SHORT FORM - BASIC MOBILITY  How much difficulty does the patient currently have. ..  -   Turning over in bed (including adjusting bedclothes, sheets and blankets)?: A Lot   -   Sitting down on and standi Patient is a 67year old male admitted on 10/5/2020 for AICD discharge. Pertinent comorbidities and personal factors impacting therapy include CVA with R bill, expressive aphasia, hx CABG, HTN. Functional outcome measures completed include AM-PAC.    Base History related to current admission: Pt is 67year old male admitted on 10/5/2020 from home with c/o AICD discharge.         Problem List  Principal Problem:    AICD discharge  Active Problems:    Palpitations    S/P CABG (coronary artery bypass graft) RANGE OF MOTION AND STRENGTH ASSESSMENT  Upper extremity ROM is within functional limits for SHAKIR JACOME grossly impaired no ROM. Pt gets PROM to joints at home.     Upper extremity strength is within functional limits for SHAKIR JACOME 0/5    NEUROLOGICAL FINDINGS Patient End of Session: In bed;Needs met;Call light within reach;RN aware of session/findings;Bracing education provided; All patient questions and concerns addressed; Ice applied    ASSESSMENT     Patient is a 67year old male admitted on 10/5/2020 for AICD No notes of this type exist for this encounter.      Multidisciplinary Problems     Active Goals        Problem: Patient/Family Goals    Goal Priority Disciplines Outcome Interventions   Patient/Family Long Term Goal     Interdisciplinary Progressing    [place]

## (undated) NOTE — LETTER
[facility] [address], [address]  Consent for Procedure/Sedation    Date: 8/20/21    Time: 1300      1. I authorize the performance upon [facility] the following:  Implantation of loop recorder     2.  I authorize Dr. Yohan Main 7/24/1945       Medical Record #: [id number]

## (undated) NOTE — IP AVS SNAPSHOT
Patient Demographics     Address  [facility] [address] [address] Phone  [phone] [facility])  [phone] [facility]      Emergency Contact(s)     Name Relation Home Work Mobile    Karma Sorensen Spouse   [phone]    Dinh Bowens Luci Mckee DO         ergocalciferol 1.25 MG (45151 UT) Caps  Commonly known as: DRISDOL/VITAMIN D2      Take 50,000 Units by mouth once a week.           Mexiletine HCl 150 MG Caps  Commonly known as: MEXITIL      Take 1 capsule (150 mg to [id number] Mexiletine HCl (MEXITIL) cap 150 mg 01/18/21 2034 Given      [id number] Mexiletine HCl (MEXITIL) cap 150 mg 01/19/21 0834 Given      [id number] Piperacillin Sod-Tazobactam So (ZOSYN) 3.375 g in dextrose 5 % 100 mL ADD-vantage 01/18/21 1814 New Bag Temp  99.1 °F (37.3 °C) Filed at 01/19/2021 1100   SpO2  96 % Filed at 01/19/2021 1100      Patient's Most Recent Weight       Most Recent Value   Patient Weight  67.6 kg (149 lb)         Lab Results Last 24 Hours      POTASSIUM [[id number]] (Normal)  Resul Specimen: Blood,peripheral      Blood Culture Result No Growth 2 Days    Blood Culture FREQ X 2 [[id number]]  (Abnormal) Collected: 01/13/21 0041    Order Status: Completed Lab Status: Final result Updated: 01/19/21 1010    Specimen: Blood,peripheral Chief Complaint:[AK.1] N/V, abdominal pain[AK.2]    History of Present Illness: Joann Townsend is a 67year old male with pmhx of CVA ([address]. 1] R sided[AK.2] deficits)[AK.1], HTN, HLD, CAD (CABG) HFrEF[AK.2] (EF 15-20%)[AK.3] s/p PPM/AICD[AK. 2] who p •  ergocalciferol 1.25 MG (64712 UT) Oral Cap, Take 50,000 Units by mouth once a week., Disp: , Rfl:     •  amiodarone HCl 400 MG Oral Tab, Take 1 tablet (400 mg total) by mouth daily. , Disp: 30 tablet, Rfl: 3    •  carvedilol 12.5 MG Oral Tab, Take 1 tabl Estimated Creatinine Clearance: 42.1 mL/min (A) (based on SCr of 1.34 mg/dL (H)). No results for input(s): PTP, INR in the last 168 hours. Recent Labs   Lab 01/13/21  0041   TROP 3.000*       Imaging: Imaging data reviewed in Epic.       ASSESSMENT / Report of Consultation    Mc Nevarez Patient Status:  Inpatient    1945 MRN [id number]   [facility] 3NE-A Attending [facility] Day # 0 PCP Wilfrid Faith MD     Date of Admission:  2021  Date of Consult: •  magnesium hydroxide (MILK OF MAGNESIA) 400 MG/5ML suspension 30 mL, 30 mL, Oral, Daily PRN  •  bisacodyl (DULCOLAX) rectal suppository 10 mg, 10 mg, Rectal, Daily PRN  •  Fleet Enema (FLEET) 7-19 GM/118ML enema 133 mL, 1 enema, Rectal, Once PRN  •  Pipe Component Value Date    INR 1.01 12/26/2020    INR 1.06 10/05/2020       Assessment/Plan:  Patient Active Problem List:     Palpitations     AICD discharge     S/P CABG (coronary artery bypass graft)     Ischemic cardiomyopathy     V-tach [facility])     CAD in History related to current admission: Pt is 67year old male admitted on 1/13/2021 from [facility] with c/o nausea/vomiting for 3 days leading to potential aspiration. Pt diagnosed with ileus.  Pt currently on 3 L O2 at rest due to hypoxia on room air Precautions: Bed/chair alarm; Other (Comment)(R bill )    WEIGHT BEARING RESTRICTION  Weight Bearing Restriction: None                PAIN ASSESSMENT   Rating: Unable to rate  Location: R LE radiating proximal to distal  Management Techniques:  Activity prom Skilled Therapy Provided: Pt received in semi supine position. Pt demonstrated fatigue and reported dizziness upon arrival. BP taken in semi supine position revealing low BP, therefore proceeded with therapeutic exercise from this position.  Turned on light Goal #3 Patient is able to sit EOB with UE support for 5 minutes at modified independence.      Goal #4     Goal #5     Goal #6     Goal Comments: Goals established on 1/13/2021, reviewed 1/18/21     PT Student Ruby Rojo and PT Makenna Luna wore gloves, gown, surgi Compensatory Strategies Recommended: To left; Slow rate;Small bites and sips  Aspiration Precautions: Upright position; Slow rate;Small bites and sips  Medication Administration Recommendations: Crushed in puree      Overall Impression: Moderate Oral with Mi Patient Viewed: Lateral.  Patient Alertness: Fully alert. Consistencies Presented: Thin liquids;Puree; Soft solid to assess oropharyngeal swallow function and assess for compensatory strategies to improve safe swallow function.     THIN LIQUIDS  Method of P Goal #3 The patient will tolerate trial upgrade of chopped/soft consistency and N/A liquids without overt signs or symptoms of aspiration with 80 % accuracy over 2-3 session(s).   Not Addressed     PLAN: Recommend continue skilled SLP intervention for dysph Patient seen for swallow evaluation at bedside. RN approved for SLP to proceed as issue regarding ileus has been resolved. Pt awake and alert. Pt agreeable to PO trials.  Attempted upper denture placement however Pt began coughing and ?gagging upon placemen • High cholesterol    • Hypotension    • Stroke [facility])     2007       Prior Living Situation: Assisted living  Diet Prior to Admission: Unknown  Precautions: Aspiration    Patient/Family Goals: Pt did not state    SWALLOWING HISTORY  Current Diet Consistenc Electronically signed by BRANDY Page on 1/15/2021  1:23 PM   Attribution Key    LK. 1 - BRANDY Page on 1/15/2021  1:17 PM                     Immunizations     Name Date      INFLUENZA defer-01/19/21     Deferral: Patient Refused       Multidi

## (undated) NOTE — IP AVS SNAPSHOT
[address]            (For Outpatient Use Only) Initial Admit Date: 12/21/2020   Inpt/Obs Admit Date: Inpt: 12/21/20 / Obs: N/A   Discharge Date:    María Elena Sanches:  [de-identified]   MRN: [de-identified]   CSN: [id number]   CEID: [id number] Subscriber Name:  Courtney Mukherjee :    Subscriber ID:  Pt Rel to Subscriber:    Hospital Account Financial Class: Medicare Advantage    2020